# Patient Record
Sex: FEMALE | Race: WHITE | NOT HISPANIC OR LATINO | URBAN - METROPOLITAN AREA
[De-identification: names, ages, dates, MRNs, and addresses within clinical notes are randomized per-mention and may not be internally consistent; named-entity substitution may affect disease eponyms.]

---

## 2019-09-29 ENCOUNTER — INPATIENT HOSPITAL (OUTPATIENT)
Dept: URBAN - METROPOLITAN AREA HOSPITAL 107 | Facility: HOSPITAL | Age: 59
End: 2019-09-29
Payer: COMMERCIAL

## 2019-09-29 DIAGNOSIS — I63.9 CEREBRAL INFARCTION, UNSPECIFIED: ICD-10-CM

## 2019-09-29 DIAGNOSIS — K62.5 HEMORRHAGE OF ANUS AND RECTUM: ICD-10-CM

## 2019-09-29 DIAGNOSIS — D64.89 OTHER SPECIFIED ANEMIAS: ICD-10-CM

## 2019-09-29 PROCEDURE — 99223 1ST HOSP IP/OBS HIGH 75: CPT | Performed by: INTERNAL MEDICINE

## 2019-09-30 ENCOUNTER — INPATIENT HOSPITAL (OUTPATIENT)
Dept: URBAN - METROPOLITAN AREA HOSPITAL 107 | Facility: HOSPITAL | Age: 59
End: 2019-09-30
Payer: COMMERCIAL

## 2019-09-30 DIAGNOSIS — B18.2 CHRONIC VIRAL HEPATITIS C: ICD-10-CM

## 2019-09-30 DIAGNOSIS — K62.5 HEMORRHAGE OF ANUS AND RECTUM: ICD-10-CM

## 2019-09-30 DIAGNOSIS — D64.9 ANEMIA, UNSPECIFIED: ICD-10-CM

## 2019-09-30 PROCEDURE — 99232 SBSQ HOSP IP/OBS MODERATE 35: CPT | Performed by: PHYSICIAN ASSISTANT

## 2019-10-02 ENCOUNTER — INPATIENT HOSPITAL (OUTPATIENT)
Dept: URBAN - METROPOLITAN AREA HOSPITAL 107 | Facility: HOSPITAL | Age: 59
End: 2019-10-02
Payer: COMMERCIAL

## 2019-10-02 DIAGNOSIS — B18.2 CHRONIC VIRAL HEPATITIS C: ICD-10-CM

## 2019-10-02 DIAGNOSIS — D64.9 ANEMIA, UNSPECIFIED: ICD-10-CM

## 2019-10-02 DIAGNOSIS — K62.5 HEMORRHAGE OF ANUS AND RECTUM: ICD-10-CM

## 2019-10-02 DIAGNOSIS — I63.9 CEREBRAL INFARCTION, UNSPECIFIED: ICD-10-CM

## 2019-10-02 DIAGNOSIS — K50.90 CROHN'S DISEASE, UNSPECIFIED, WITHOUT COMPLICATIONS: ICD-10-CM

## 2019-10-02 PROCEDURE — 99232 SBSQ HOSP IP/OBS MODERATE 35: CPT | Performed by: PHYSICIAN ASSISTANT

## 2019-10-03 PROCEDURE — 99231 SBSQ HOSP IP/OBS SF/LOW 25: CPT | Performed by: PHYSICIAN ASSISTANT

## 2020-02-08 ENCOUNTER — APPOINTMENT (OUTPATIENT)
Dept: GENERAL RADIOLOGY | Facility: HOSPITAL | Age: 60
End: 2020-02-08

## 2020-02-08 ENCOUNTER — APPOINTMENT (OUTPATIENT)
Dept: CT IMAGING | Facility: HOSPITAL | Age: 60
End: 2020-02-08

## 2020-02-08 ENCOUNTER — HOSPITAL ENCOUNTER (INPATIENT)
Facility: HOSPITAL | Age: 60
LOS: 6 days | Discharge: HOME | End: 2020-02-14
Attending: EMERGENCY MEDICINE | Admitting: INTERNAL MEDICINE

## 2020-02-08 DIAGNOSIS — F10.229 ACUTE ALCOHOLIC INTOXICATION IN ALCOHOLISM WITH COMPLICATION (HCC): ICD-10-CM
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
## 2020-02-08 DIAGNOSIS — R19.5 HEME POSITIVE STOOL: ICD-10-CM

## 2020-02-08 DIAGNOSIS — D50.9 IRON DEFICIENCY ANEMIA, UNSPECIFIED IRON DEFICIENCY ANEMIA TYPE: ICD-10-CM

## 2020-02-08 DIAGNOSIS — S42.401A ELBOW FRACTURE, RIGHT, CLOSED, INITIAL ENCOUNTER: ICD-10-CM

## 2020-02-08 DIAGNOSIS — G81.94 LEFT HEMIPARESIS (HCC): ICD-10-CM

## 2020-02-08 LAB
ALBUMIN SERPL-MCNC: 4.5 G/DL (ref 3.5–5.2)
ALBUMIN/GLOB SERPL: 1.5 G/DL
ALP SERPL-CCNC: 97 U/L (ref 39–117)
ALT SERPL W P-5'-P-CCNC: 10 U/L (ref 1–33)
AMPHET+METHAMPHET UR QL: NEGATIVE
ANION GAP SERPL CALCULATED.3IONS-SCNC: 15.9 MMOL/L (ref 5–15)
APAP SERPL-MCNC: <5 MCG/ML (ref 10–30)
AST SERPL-CCNC: 23 U/L (ref 1–32)
BARBITURATES UR QL SCN: NEGATIVE
BASOPHILS # BLD AUTO: 0.12 10*3/MM3 (ref 0–0.2)
BASOPHILS NFR BLD AUTO: 1.7 % (ref 0–1.5)
BENZODIAZ UR QL SCN: NEGATIVE
BILIRUB SERPL-MCNC: 0.2 MG/DL (ref 0.2–1.2)
BILIRUB UR QL STRIP: NEGATIVE
BUN BLD-MCNC: 12 MG/DL (ref 8–23)
BUN/CREAT SERPL: 10.6 (ref 7–25)
CALCIUM SPEC-SCNC: 8.7 MG/DL (ref 8.6–10.5)
CANNABINOIDS SERPL QL: NEGATIVE
CHLORIDE SERPL-SCNC: 105 MMOL/L (ref 98–107)
CK SERPL-CCNC: 238 U/L (ref 20–180)
CLARITY UR: CLEAR
CO2 SERPL-SCNC: 20.1 MMOL/L (ref 22–29)
COCAINE UR QL: NEGATIVE
COLOR UR: YELLOW
CREAT BLD-MCNC: 1.13 MG/DL (ref 0.57–1)
DEPRECATED RDW RBC AUTO: 53.1 FL (ref 37–54)
EOSINOPHIL # BLD AUTO: 0.15 10*3/MM3 (ref 0–0.4)
EOSINOPHIL NFR BLD AUTO: 2.1 % (ref 0.3–6.2)
ERYTHROCYTE [DISTWIDTH] IN BLOOD BY AUTOMATED COUNT: 15.3 % (ref 12.3–15.4)
ETHANOL BLD-MCNC: 414 MG/DL (ref 0–10)
ETHANOL UR QL: 0.41 %
GFR SERPL CREATININE-BSD FRML MDRD: 49 ML/MIN/1.73
GLOBULIN UR ELPH-MCNC: 3.1 GM/DL
GLUCOSE BLD-MCNC: 109 MG/DL (ref 65–99)
GLUCOSE BLDC GLUCOMTR-MCNC: 111 MG/DL (ref 70–130)
GLUCOSE UR STRIP-MCNC: NEGATIVE MG/DL
HCT VFR BLD AUTO: 28.5 % (ref 34–46.6)
HGB BLD-MCNC: 9.2 G/DL (ref 12–15.9)
HGB UR QL STRIP.AUTO: NEGATIVE
IMM GRANULOCYTES # BLD AUTO: 0.02 10*3/MM3 (ref 0–0.05)
IMM GRANULOCYTES NFR BLD AUTO: 0.3 % (ref 0–0.5)
INR PPP: 1.23 (ref 0.9–1.1)
KETONES UR QL STRIP: NEGATIVE
LEUKOCYTE ESTERASE UR QL STRIP.AUTO: NEGATIVE
LYMPHOCYTES # BLD AUTO: 2.16 10*3/MM3 (ref 0.7–3.1)
LYMPHOCYTES NFR BLD AUTO: 30.7 % (ref 19.6–45.3)
MAGNESIUM SERPL-MCNC: 2.7 MG/DL (ref 1.6–2.4)
MCH RBC QN AUTO: 31.3 PG (ref 26.6–33)
MCHC RBC AUTO-ENTMCNC: 32.3 G/DL (ref 31.5–35.7)
MCV RBC AUTO: 96.9 FL (ref 79–97)
METHADONE UR QL SCN: NEGATIVE
MONOCYTES # BLD AUTO: 0.52 10*3/MM3 (ref 0.1–0.9)
MONOCYTES NFR BLD AUTO: 7.4 % (ref 5–12)
NEUTROPHILS # BLD AUTO: 4.06 10*3/MM3 (ref 1.7–7)
NEUTROPHILS NFR BLD AUTO: 57.8 % (ref 42.7–76)
NITRITE UR QL STRIP: NEGATIVE
NRBC BLD AUTO-RTO: 0 /100 WBC (ref 0–0.2)
OPIATES UR QL: NEGATIVE
OXYCODONE UR QL SCN: NEGATIVE
PH UR STRIP.AUTO: 5.5 [PH] (ref 5–8)
PLATELET # BLD AUTO: 351 10*3/MM3 (ref 140–450)
PMV BLD AUTO: 8.5 FL (ref 6–12)
POTASSIUM BLD-SCNC: 4 MMOL/L (ref 3.5–5.2)
PROT SERPL-MCNC: 7.6 G/DL (ref 6–8.5)
PROT UR QL STRIP: NEGATIVE
PROTHROMBIN TIME: 15.2 SECONDS (ref 11.7–14.2)
RBC # BLD AUTO: 2.94 10*6/MM3 (ref 3.77–5.28)
SALICYLATES SERPL-MCNC: 0.3 MG/DL
SODIUM BLD-SCNC: 141 MMOL/L (ref 136–145)
SP GR UR STRIP: 1.01 (ref 1–1.03)
TROPONIN T SERPL-MCNC: <0.01 NG/ML (ref 0–0.03)
UROBILINOGEN UR QL STRIP: NORMAL
WBC NRBC COR # BLD: 7.03 10*3/MM3 (ref 3.4–10.8)

## 2020-02-08 PROCEDURE — 73200 CT UPPER EXTREMITY W/O DYE: CPT

## 2020-02-08 PROCEDURE — 84484 ASSAY OF TROPONIN QUANT: CPT | Performed by: EMERGENCY MEDICINE

## 2020-02-08 PROCEDURE — G0378 HOSPITAL OBSERVATION PER HR: HCPCS

## 2020-02-08 PROCEDURE — 73070 X-RAY EXAM OF ELBOW: CPT

## 2020-02-08 PROCEDURE — 99285 EMERGENCY DEPT VISIT HI MDM: CPT

## 2020-02-08 PROCEDURE — 81003 URINALYSIS AUTO W/O SCOPE: CPT | Performed by: EMERGENCY MEDICINE

## 2020-02-08 PROCEDURE — 80307 DRUG TEST PRSMV CHEM ANLYZR: CPT | Performed by: EMERGENCY MEDICINE

## 2020-02-08 PROCEDURE — 70450 CT HEAD/BRAIN W/O DYE: CPT

## 2020-02-08 PROCEDURE — 83735 ASSAY OF MAGNESIUM: CPT | Performed by: EMERGENCY MEDICINE

## 2020-02-08 PROCEDURE — 82550 ASSAY OF CK (CPK): CPT | Performed by: EMERGENCY MEDICINE

## 2020-02-08 PROCEDURE — 85025 COMPLETE CBC W/AUTO DIFF WBC: CPT | Performed by: EMERGENCY MEDICINE

## 2020-02-08 PROCEDURE — 82962 GLUCOSE BLOOD TEST: CPT

## 2020-02-08 PROCEDURE — 85610 PROTHROMBIN TIME: CPT | Performed by: EMERGENCY MEDICINE

## 2020-02-08 PROCEDURE — 25010000002 THIAMINE PER 100 MG: Performed by: EMERGENCY MEDICINE

## 2020-02-08 PROCEDURE — 25010000002 MAGNESIUM SULFATE PER 500 MG OF MAGNESIUM: Performed by: EMERGENCY MEDICINE

## 2020-02-08 PROCEDURE — 93005 ELECTROCARDIOGRAM TRACING: CPT | Performed by: EMERGENCY MEDICINE

## 2020-02-08 PROCEDURE — 80053 COMPREHEN METABOLIC PANEL: CPT | Performed by: EMERGENCY MEDICINE

## 2020-02-08 PROCEDURE — 93010 ELECTROCARDIOGRAM REPORT: CPT | Performed by: INTERNAL MEDICINE

## 2020-02-08 RX ORDER — ATORVASTATIN CALCIUM 40 MG/1
40 TABLET, FILM COATED ORAL NIGHTLY
COMMUNITY

## 2020-02-08 RX ORDER — FAMOTIDINE 20 MG/1
20 TABLET, FILM COATED ORAL
Status: DISCONTINUED | OUTPATIENT
Start: 2020-02-09 | End: 2020-02-13

## 2020-02-08 RX ORDER — SODIUM CHLORIDE 9 MG/ML
100 INJECTION, SOLUTION INTRAVENOUS CONTINUOUS
Status: DISCONTINUED | OUTPATIENT
Start: 2020-02-08 | End: 2020-02-11

## 2020-02-08 RX ORDER — NITROGLYCERIN 0.4 MG/1
0.4 TABLET SUBLINGUAL
Status: DISCONTINUED | OUTPATIENT
Start: 2020-02-08 | End: 2020-02-14 | Stop reason: HOSPADM

## 2020-02-08 RX ORDER — CETIRIZINE HYDROCHLORIDE 5 MG/1
5 TABLET ORAL DAILY
COMMUNITY

## 2020-02-08 RX ORDER — DULOXETIN HYDROCHLORIDE 60 MG/1
60 CAPSULE, DELAYED RELEASE ORAL DAILY
COMMUNITY

## 2020-02-08 RX ORDER — ONDANSETRON 2 MG/ML
4 INJECTION INTRAMUSCULAR; INTRAVENOUS EVERY 6 HOURS PRN
Status: DISCONTINUED | OUTPATIENT
Start: 2020-02-08 | End: 2020-02-14 | Stop reason: HOSPADM

## 2020-02-08 RX ORDER — UREA 10 %
1 LOTION (ML) TOPICAL NIGHTLY
COMMUNITY

## 2020-02-08 RX ORDER — ATORVASTATIN CALCIUM 20 MG/1
40 TABLET, FILM COATED ORAL NIGHTLY
Status: DISCONTINUED | OUTPATIENT
Start: 2020-02-08 | End: 2020-02-14 | Stop reason: HOSPADM

## 2020-02-08 RX ORDER — NITROGLYCERIN 0.4 MG/1
0.4 TABLET SUBLINGUAL
COMMUNITY

## 2020-02-08 RX ORDER — AMLODIPINE BESYLATE 10 MG/1
10 TABLET ORAL DAILY
COMMUNITY

## 2020-02-08 RX ORDER — ACETAMINOPHEN 325 MG/1
650 TABLET ORAL EVERY 6 HOURS PRN
COMMUNITY

## 2020-02-08 RX ORDER — LORAZEPAM 2 MG/ML
2 INJECTION INTRAMUSCULAR
Status: DISCONTINUED | OUTPATIENT
Start: 2020-02-08 | End: 2020-02-14 | Stop reason: HOSPADM

## 2020-02-08 RX ORDER — GUAIFENESIN AND DEXTROMETHORPHAN HYDROBROMIDE 100; 10 MG/5ML; MG/5ML
10 SOLUTION ORAL 4 TIMES DAILY PRN
COMMUNITY

## 2020-02-08 RX ORDER — THIAMINE MONONITRATE (VIT B1) 100 MG
100 TABLET ORAL DAILY
COMMUNITY

## 2020-02-08 RX ORDER — LORAZEPAM 2 MG/ML
1 INJECTION INTRAMUSCULAR
Status: DISCONTINUED | OUTPATIENT
Start: 2020-02-08 | End: 2020-02-14 | Stop reason: HOSPADM

## 2020-02-08 RX ORDER — AMLODIPINE BESYLATE 10 MG/1
10 TABLET ORAL DAILY
Status: DISCONTINUED | OUTPATIENT
Start: 2020-02-09 | End: 2020-02-14 | Stop reason: HOSPADM

## 2020-02-08 RX ORDER — ONDANSETRON 4 MG/1
4 TABLET, FILM COATED ORAL EVERY 6 HOURS PRN
Status: DISCONTINUED | OUTPATIENT
Start: 2020-02-08 | End: 2020-02-14 | Stop reason: HOSPADM

## 2020-02-08 RX ORDER — UREA 10 %
1 LOTION (ML) TOPICAL NIGHTLY
Status: DISCONTINUED | OUTPATIENT
Start: 2020-02-08 | End: 2020-02-12

## 2020-02-08 RX ORDER — FOLIC ACID 1 MG/1
1 TABLET ORAL DAILY
COMMUNITY

## 2020-02-08 RX ORDER — ASPIRIN 81 MG/1
81 TABLET ORAL DAILY
Status: DISCONTINUED | OUTPATIENT
Start: 2020-02-09 | End: 2020-02-14 | Stop reason: HOSPADM

## 2020-02-08 RX ORDER — FERROUS SULFATE 325(65) MG
325 TABLET ORAL
COMMUNITY

## 2020-02-08 RX ORDER — LORAZEPAM 1 MG/1
1 TABLET ORAL
Status: DISCONTINUED | OUTPATIENT
Start: 2020-02-08 | End: 2020-02-14 | Stop reason: HOSPADM

## 2020-02-08 RX ORDER — POLYETHYLENE GLYCOL 3350 17 G/17G
17 POWDER, FOR SOLUTION ORAL DAILY PRN
Status: DISCONTINUED | OUTPATIENT
Start: 2020-02-08 | End: 2020-02-14 | Stop reason: HOSPADM

## 2020-02-08 RX ORDER — ACETAMINOPHEN 325 MG/1
650 TABLET ORAL EVERY 6 HOURS PRN
Status: DISCONTINUED | OUTPATIENT
Start: 2020-02-08 | End: 2020-02-14 | Stop reason: HOSPADM

## 2020-02-08 RX ORDER — FERROUS SULFATE 325(65) MG
325 TABLET ORAL
Status: DISCONTINUED | OUTPATIENT
Start: 2020-02-09 | End: 2020-02-14 | Stop reason: HOSPADM

## 2020-02-08 RX ORDER — DULOXETIN HYDROCHLORIDE 60 MG/1
60 CAPSULE, DELAYED RELEASE ORAL DAILY
Status: DISCONTINUED | OUTPATIENT
Start: 2020-02-09 | End: 2020-02-14 | Stop reason: HOSPADM

## 2020-02-08 RX ORDER — FOLIC ACID 1 MG/1
1 TABLET ORAL DAILY
Status: DISCONTINUED | OUTPATIENT
Start: 2020-02-09 | End: 2020-02-14 | Stop reason: HOSPADM

## 2020-02-08 RX ORDER — ACETAMINOPHEN 500 MG
1000 TABLET ORAL ONCE
Status: COMPLETED | OUTPATIENT
Start: 2020-02-08 | End: 2020-02-08

## 2020-02-08 RX ORDER — SODIUM CHLORIDE 0.9 % (FLUSH) 0.9 %
10 SYRINGE (ML) INJECTION AS NEEDED
Status: DISCONTINUED | OUTPATIENT
Start: 2020-02-08 | End: 2020-02-14 | Stop reason: HOSPADM

## 2020-02-08 RX ORDER — ONDANSETRON 4 MG/1
4 TABLET, FILM COATED ORAL EVERY 6 HOURS PRN
COMMUNITY

## 2020-02-08 RX ORDER — CARVEDILOL 12.5 MG/1
12.5 TABLET ORAL 2 TIMES DAILY WITH MEALS
Status: DISCONTINUED | OUTPATIENT
Start: 2020-02-08 | End: 2020-02-14 | Stop reason: HOSPADM

## 2020-02-08 RX ORDER — ASPIRIN 81 MG/1
81 TABLET ORAL DAILY
COMMUNITY

## 2020-02-08 RX ORDER — ASCORBIC ACID 500 MG
500 TABLET ORAL DAILY
COMMUNITY

## 2020-02-08 RX ORDER — THIAMINE MONONITRATE (VIT B1) 100 MG
100 TABLET ORAL DAILY
Status: DISCONTINUED | OUTPATIENT
Start: 2020-02-09 | End: 2020-02-14 | Stop reason: HOSPADM

## 2020-02-08 RX ORDER — CARVEDILOL 12.5 MG/1
12.5 TABLET ORAL 2 TIMES DAILY WITH MEALS
COMMUNITY

## 2020-02-08 RX ORDER — FAMOTIDINE 40 MG/1
20 TABLET, FILM COATED ORAL 2 TIMES DAILY
COMMUNITY
End: 2020-02-14 | Stop reason: HOSPADM

## 2020-02-08 RX ORDER — LORAZEPAM 1 MG/1
2 TABLET ORAL
Status: DISCONTINUED | OUTPATIENT
Start: 2020-02-08 | End: 2020-02-14 | Stop reason: HOSPADM

## 2020-02-08 RX ADMIN — CARVEDILOL 12.5 MG: 12.5 TABLET, FILM COATED ORAL at 22:48

## 2020-02-08 RX ADMIN — ATORVASTATIN CALCIUM 40 MG: 20 TABLET, FILM COATED ORAL at 22:49

## 2020-02-08 RX ADMIN — Medication 1 MG: at 22:50

## 2020-02-08 RX ADMIN — ACETAMINOPHEN 1000 MG: 500 TABLET, FILM COATED ORAL at 09:42

## 2020-02-08 RX ADMIN — THIAMINE HYDROCHLORIDE 1000 ML/HR: 100 INJECTION, SOLUTION INTRAMUSCULAR; INTRAVENOUS at 08:39

## 2020-02-08 RX ADMIN — LORAZEPAM 2 MG: 1 TABLET ORAL at 22:49

## 2020-02-08 NOTE — ED PROVIDER NOTES
" EMERGENCY DEPARTMENT ENCOUNTER    Room Number:  33/33  Date of encounter:  2/8/2020  PCP: Provider, No Known  Historian: Patient and EMS      HPI:  Chief Complaint: Confusion  A complete HPI/ROS/PMH/PSH/SH/FH are unobtainable due to: Mental status change  Context: Marylin iWlcox is a 59 y.o. female who presents to the ED from [facility] after pt was found down on the floor this AM, confused and drowsy, with a bottle of vodka, per EMS. She is unsure why she was brought here by EMS and states she \"feels fine\". Hx of two strokes with residual L sided deficits. Her last stroke was in 2019. She states she drinks approx. 2 drinks per day.    MEDICAL HISTORY REVIEW  Pt was admitted to [facility] on 9/28/2019 for cerebral brain hemorrhage and GI hemorrhage.    PAST MEDICAL HISTORY  Active Ambulatory Problems     Diagnosis Date Noted   • No Active Ambulatory Problems     Resolved Ambulatory Problems     Diagnosis Date Noted   • No Resolved Ambulatory Problems     No Additional Past Medical History         PAST SURGICAL HISTORY  No past surgical history on file.      FAMILY HISTORY  No family history on file.      SOCIAL HISTORY  Social History     Socioeconomic History   • Marital status: Single     Spouse name: Not on file   • Number of children: Not on file   • Years of education: Not on file   • Highest education level: Not on file         ALLERGIES  Valsartan        REVIEW OF SYSTEMS  Review of Systems   Unable to perform ROS: Mental status change   Psychiatric/Behavioral: Positive for confusion (per EMS).        All systems reviewed and negative except for those discussed in HPI.       PHYSICAL EXAM    I have reviewed the triage vital signs and nursing notes.    ED Triage Vitals [02/08/20 0756]   Temp Heart Rate Resp BP SpO2   98 °F (36.7 °C) 60 18 124/92 95 %      Temp src Heart Rate Source Patient Position BP Location FiO2 (%)   Tympanic Monitor -- -- --       Physical Exam    Physical Exam "   Constitutional: No distress.   HENT:  Head: Normocephalic and atraumatic. Atraumatic calvarium.  Oropharynx: Mucous membranes are moist.   Eyes: No scleral icterus. Conjunctival pallor.  Neck: Painless range of motion noted. Neck supple.   Cardiovascular: Normal rate, regular rhythm and intact distal pulses.  Pulmonary/Chest: No respiratory distress. There are no wheezes, no rhonchi, and no rales.   Abdominal: Soft. There is no focal tenderness. There is no rebound and no guarding. She is mildly distended.  Musculoskeletal: There is no pedal edema or calf tenderness. Her external back is atraumatic. There is no midline tenderness to palpation of the C or T spine. She has painless ROM of the neck.  Neurological: Alert. Her speech is slightly garbled, but intelligible. She is able to identify herself and her sisters, at bedside, but is unable to identify the date or location. Baseline sensation noted. She has inability to look to the left. She is only able to raise her L arm off the bed against gravity. She has mild contracture of the L hand. She is able to raise both legs off the bed, but her L is weaker (due to prior stroke).  Skin: Skin is pink, warm, and dry. No pallor. There is diffuse ecchymosis to the BUE with varying age. There is a large bruise to her R elbow.  Psychiatric: Mood and affect normal.   Nursing note and vitals reviewed.    LAB RESULTS  Recent Results (from the past 24 hour(s))   POC Glucose Once    Collection Time: 02/08/20  8:17 AM   Result Value Ref Range    Glucose 111 70 - 130 mg/dL   Comprehensive Metabolic Panel    Collection Time: 02/08/20  8:40 AM   Result Value Ref Range    Glucose 109 (H) 65 - 99 mg/dL    BUN 12 8 - 23 mg/dL    Creatinine 1.13 (H) 0.57 - 1.00 mg/dL    Sodium 141 136 - 145 mmol/L    Potassium 4.0 3.5 - 5.2 mmol/L    Chloride 105 98 - 107 mmol/L    CO2 20.1 (L) 22.0 - 29.0 mmol/L    Calcium 8.7 8.6 - 10.5 mg/dL    Total Protein 7.6 6.0 - 8.5 g/dL    Albumin 4.50 3.50 -  5.20 g/dL    ALT (SGPT) 10 1 - 33 U/L    AST (SGOT) 23 1 - 32 U/L    Alkaline Phosphatase 97 39 - 117 U/L    Total Bilirubin 0.2 0.2 - 1.2 mg/dL    eGFR Non African Amer 49 (L) >60 mL/min/1.73    Globulin 3.1 gm/dL    A/G Ratio 1.5 g/dL    BUN/Creatinine Ratio 10.6 7.0 - 25.0    Anion Gap 15.9 (H) 5.0 - 15.0 mmol/L   Protime-INR    Collection Time: 02/08/20  8:40 AM   Result Value Ref Range    Protime 15.2 (H) 11.7 - 14.2 Seconds    INR 1.23 (H) 0.90 - 1.10   Troponin    Collection Time: 02/08/20  8:40 AM   Result Value Ref Range    Troponin T <0.010 0.000 - 0.030 ng/mL   Magnesium    Collection Time: 02/08/20  8:40 AM   Result Value Ref Range    Magnesium 2.7 (H) 1.6 - 2.4 mg/dL   Acetaminophen Level    Collection Time: 02/08/20  8:40 AM   Result Value Ref Range    Acetaminophen <5.0 (L) 10.0 - 30.0 mcg/mL   Ethanol    Collection Time: 02/08/20  8:40 AM   Result Value Ref Range    Ethanol 414 (H) 0 - 10 mg/dL    Ethanol % 0.414 %   Salicylate Level    Collection Time: 02/08/20  8:40 AM   Result Value Ref Range    Salicylate 0.3 <=30.0 mg/dL   CBC Auto Differential    Collection Time: 02/08/20  8:40 AM   Result Value Ref Range    WBC 7.03 3.40 - 10.80 10*3/mm3    RBC 2.94 (L) 3.77 - 5.28 10*6/mm3    Hemoglobin 9.2 (L) 12.0 - 15.9 g/dL    Hematocrit 28.5 (L) 34.0 - 46.6 %    MCV 96.9 79.0 - 97.0 fL    MCH 31.3 26.6 - 33.0 pg    MCHC 32.3 31.5 - 35.7 g/dL    RDW 15.3 12.3 - 15.4 %    RDW-SD 53.1 37.0 - 54.0 fl    MPV 8.5 6.0 - 12.0 fL    Platelets 351 140 - 450 10*3/mm3    Neutrophil % 57.8 42.7 - 76.0 %    Lymphocyte % 30.7 19.6 - 45.3 %    Monocyte % 7.4 5.0 - 12.0 %    Eosinophil % 2.1 0.3 - 6.2 %    Basophil % 1.7 (H) 0.0 - 1.5 %    Immature Grans % 0.3 0.0 - 0.5 %    Neutrophils, Absolute 4.06 1.70 - 7.00 10*3/mm3    Lymphocytes, Absolute 2.16 0.70 - 3.10 10*3/mm3    Monocytes, Absolute 0.52 0.10 - 0.90 10*3/mm3    Eosinophils, Absolute 0.15 0.00 - 0.40 10*3/mm3    Basophils, Absolute 0.12 0.00 - 0.20 10*3/mm3     Immature Grans, Absolute 0.02 0.00 - 0.05 10*3/mm3    nRBC 0.0 0.0 - 0.2 /100 WBC   CK    Collection Time: 02/08/20  8:40 AM   Result Value Ref Range    Creatine Kinase 238 (H) 20 - 180 U/L       Ordered the above labs and independently reviewed the results.        RADIOLOGY  Xr Elbow 2 View Right    Result Date: 2/8/2020  THREE-VIEW RIGHT ELBOW  HISTORY: Fell. Elbow pain.  FINDINGS: There are displaced fracture fragments involving the medial epicondyle with superior displacement of the fracture fragments measuring up to 4.5 cm. These fracture fragments are adjacent to the posterior margin of the distal humerus and some of the fracture margins appear fairly smooth and potentially related to long-standing injury and clinical correlation is therefore required. I suspect there is also an acute component with associated large joint effusion as well as some soft tissue swelling in this region.  This report was finalized on 2/8/2020 9:20 AM by Dr. Joe Kwan M.D.      Ct Head Without Contrast    Result Date: 2/8/2020  CT BRAIN WITHOUT CONTRAST  HISTORY: Fell. Confusion.  FINDINGS: The CT scan was performed as an emergency procedure through the brain without contrast. There is mild diffuse atrophy and chronic small vessel ischemic change. There is a localized area of encephalomalacia in the right parietal region most consistent with old infarct. There is no evidence of acute hemorrhage or mass effect and the visualized sinuses are clear.      Radiation dose reduction techniques were utilized, including automated exposure control and exposure modulation based on body size.  This report was finalized on 2/8/2020 9:20 AM by Dr. Joe Kwan M.D.        I ordered the above noted radiological studies. Reviewed by me and discussed with radiologist.  See dictation for official radiology interpretation.      PROCEDURES    Splint - Cast - Strapping  Date/Time: 2/8/2020 11:09 AM  Performed by: Gustavo Alvarez MD  Authorized by:  Gustavo Alvarez MD     Consent:     Consent obtained:  Verbal    Consent given by:  Patient    Risks discussed:  Numbness and pain  Pre-procedure details:     Sensation:  Normal  Procedure details:     Laterality:  Right    Location:  Elbow    Elbow:  R elbow    Strapping: no      Splint type:  Long arm    Supplies:  Elastic bandage, Ortho-Glass, cotton padding and sling  Post-procedure details:     Pain:  Unchanged    Sensation:  Normal    Patient tolerance of procedure:  Tolerated well, no immediate complications        EKG           EKG time: 0826  Rhythm/Rate: NSR rate 70  P waves and MI: Prolonged MI segment  QRS, axis: Narrow QRS complex  ST and T waves:   No STEMI  QTC is within normal limits    Interpreted Contemporaneously by me, independently viewed  Comparison: No old for comparison.      MEDICATIONS GIVEN IN ER    Medications   sodium chloride 0.9 % flush 10 mL (has no administration in time range)   multiple vitamin (M.V.I. Adult) 10 mL, thiamine (B-1) 100 mg, folic acid 1 mg, magnesium sulfate 2 g in sodium chloride 0.9 % 1,000 mL infusion (1,000 mL/hr Intravenous New Bag 2/8/20 0839)   acetaminophen (TYLENOL) tablet 1,000 mg (1,000 mg Oral Given 2/8/20 0942)         PROGRESS, DATA ANALYSIS, CONSULTS, AND MEDICAL DECISION MAKING    0801 Ordered labs, EKG, UDS, ethanol level, acetaminophen level, POC glucose, and CT head for further evaluation.    0813 Ordered XR R elbow for further evaluation.    0924 Per pt family, pt is c/o HA. Ordered tylenol. Placed call to [facility] for admission.    0925 Discussed results of pt CT and XR with radiologist, Dr Kwan.    0940 Discussed pt with Dr. Cao, on call for Dr. Cornejo, [facility], who agrees to admit. He requests ortho consult and admission for observation to telemetry.    1004 Rechecked with pt, who is resting comfortably, with two sisters at bedside. Pt is now more coherent at this time. She states that her R elbow has been bruised and tender for approx. 3  months. I have informed her and her family of the results of her work up and that she will need to be admitted for her fractured R elbow and ortho consult. She requires admission as she does not have much functionality of her L side to compensate with the R elbow fracture. Plan to admit after she is splinted. Pt and pt family understand and agree with the plan, all questions answered.    1108 Discussed pt with Dr. Beal ortho, who will consult and requests a CT be obtained of the R elbow.    1110 Rechecked with pt and applied splint. Plan to admit as previously discussed.    All labs have been independently reviewed by me.  All radiology studies have been reviewed by me and discussed with radiologist dictating the report.   EKG's independently viewed and interpreted by me.  Discussion below represents my analysis of pertinent findings related to patient's condition, differential diagnosis, treatment plan and final disposition.       AS OF 9:53 AM VITALS:    BP - 130/89  HR - 75  TEMP - 98 °F (36.7 °C) (Tympanic)  O2 SATS - 97%        DIAGNOSIS  Final diagnoses:   Fall, initial encounter   Acute alcoholic intoxication in alcoholism with complication (CMS/HCC)   Elbow fracture, right, closed, initial encounter   Left hemiparesis (CMS/HCC)         DISPOSITION  ADMISSION TO TELEMETRY    Discussed treatment plan and reason for admission with pt/family and admitting physician.  Pt/family voiced understanding of the plan for admission for further testing/treatment as needed.     Documentation assistance provided by giovanny Villatoro for Gustavo Alvarez MD.  Information recorded by the giovanny was done at my direction and has been verified and validated by me.     Maine Villatoro  02/08/20 1110       Gustavo Alvarez MD  02/08/20 7405

## 2020-02-08 NOTE — H&P
HISTORY AND PHYSICAL   [facility]        Patient Identification:  Name: Marylin Wilcox  Age: 59 y.o.  Sex: female  :  1960  MRN: [id number]                     Primary Care Physician: Provider, No Known    Chief Complaint:  59 year old female was brought in after a fall at home; she was found on the floor; she was confused and had a bottle of vodka; she is unsure of what happened and is still mildly confused; she has a history of prior hemorrhagic stroke and gi bleeding; she also has a history of alcohol dependence; two sisters are at the bedside; the patient lives in an independent living facility and has had four falls within the last week per family; the patient does not remember this;     History of Present Illness:   As above    Past Medical History:  Past Medical History:   Diagnosis Date   • Alcohol abuse    • Allergic rhinitis    • Anemia    • Crohn's colitis (CMS/HCC)    • Depression    • GERD (gastroesophageal reflux disease)    • GI bleed    • Hepatitis C    • Hernia, diaphragmatic    • Hypertension    • IBS (irritable bowel syndrome)    • Iron deficiency    • Stroke (CMS/HCC)    • Syncope    • Syncope      Past Surgical History:  History reviewed. No pertinent surgical history.   Home Meds:  Medications Prior to Admission   Medication Sig Dispense Refill Last Dose   • acetaminophen (TYLENOL) 325 MG tablet Take 650 mg by mouth Every 6 (Six) Hours As Needed for Mild Pain .      • amLODIPine (NORVASC) 10 MG tablet Take 10 mg by mouth Daily.      • aspirin 81 MG EC tablet Take 81 mg by mouth Daily.      • atorvastatin (LIPITOR) 40 MG tablet Take 40 mg by mouth Every Night.      • carvedilol (COREG) 12.5 MG tablet Take 12.5 mg by mouth 2 (Two) Times a Day With Meals.      • cetirizine (zyrTEC) 5 MG tablet Take 5 mg by mouth Daily.      • DULoxetine (CYMBALTA) 60 MG capsule Take 60 mg by mouth Daily.      • famotidine (PEPCID) 40 MG tablet Take 20 mg by mouth 2 (Two) Times a Day.      •  ferrous sulfate 325 (65 FE) MG tablet Take 325 mg by mouth Daily With Breakfast.      • folic acid (FOLVITE) 1 MG tablet Take 1 mg by mouth Daily.      • magnesium oxide (MAG-OX) 400 tablet tablet Take 400 mg by mouth Daily.      • melatonin 1 MG tablet Take 1 mg by mouth Every Night.      • Multiple Vitamins-Minerals (MULTIVITAMIN ADULT PO) Take  by mouth Daily.      • nitroglycerin (NITROSTAT) 0.4 MG SL tablet Place 0.4 mg under the tongue Every 5 (Five) Minutes As Needed for Chest Pain. Take no more than 3 doses in 15 minutes.      • ondansetron (ZOFRAN) 4 MG tablet Take 4 mg by mouth Every 6 (Six) Hours As Needed for Nausea or Vomiting.      • Polyethylene Glycol 3350 (MIRALAX PO) Take 17 g by mouth Daily As Needed.      • Pseudoephedrine-DM-GG (ROBAFEN CF PO) Take 10 mL by mouth 4 (Four) Times a Day As Needed.      • thiamine (VITAMIN B-1) 100 MG tablet Take 100 mg by mouth Daily.      • vitamin C (ASCORBIC ACID) 500 MG tablet Take 500 mg by mouth Daily.          Allergies:  Allergies   Allergen Reactions   • Valsartan Nausea And Vomiting     Immunizations:    There is no immunization history on file for this patient.  Social History:   Social History     Social History Narrative   • Not on file     Social History     Socioeconomic History   • Marital status: Single     Spouse name: Not on file   • Number of children: Not on file   • Years of education: Not on file   • Highest education level: Not on file   Tobacco Use   • Smoking status: Former Smoker     Last attempt to quit: 2019     Years since quittin.3   • Smokeless tobacco: Never Used   Substance and Sexual Activity   • Alcohol use: Yes     Alcohol/week: 14.0 standard drinks     Types: 14 Shots of liquor per week     Comment: 2 DRINKS/DAY SINCE NOV. GRADUALLY INCREASING   • Drug use: Not Currently   • Sexual activity: Defer       Family History:  History reviewed. No pertinent family history.     Review of Systems  Patient is confused and  "forgetful so cannot give an accurate ros.    Objective:  tMax 24 hrs: Temp (24hrs), Av.2 °F (36.8 °C), Min:97.7 °F (36.5 °C), Max:98.8 °F (37.1 °C)    Vitals Ranges:   Temp:  [97.7 °F (36.5 °C)-98.8 °F (37.1 °C)] 97.7 °F (36.5 °C)  Heart Rate:  [60-94] 94  Resp:  [18-20] 20  BP: (114-140)/(73-94) 140/94      Exam:  /94 (BP Location: Left arm, Patient Position: Lying)   Pulse 94   Temp 97.7 °F (36.5 °C) (Oral)   Resp 20   Ht 170.2 cm (67\")   Wt 72.6 kg (160 lb)   SpO2 97%   BMI 25.06 kg/m²     General Appearance:     Alert, cooperative, no distress, appears older than stated age and anxious   Head:    Normocephalic, without obvious abnormality, atraumatic   Eyes:    PERRL, conjunctiva/corneas clear, EOM's intact, both eyes   Ears:    Normal external ear canals, both ears   Nose:   Nares normal, septum midline, mucosa normal, no drainage    or sinus tenderness   Throat:   Lips, mucosa, and tongue normal   Neck:   Supple, symmetrical, trachea midline, no adenopathy;     thyroid:  no enlargement/tenderness/nodules; no carotid    bruit or JVD   Back:     Symmetric, no curvature, ROM normal, no CVA tenderness   Lungs:     Clear to auscultation bilaterally, respirations unlabored   Chest Wall:    No tenderness or deformity    Heart:    Regular rate and rhythm, S1 and S2 normal, no murmur, rub   or gallop   Abdomen:     Soft, non-tender, bowel sounds active all four quadrants,     no masses, no hepatomegaly, no splenomegaly   Extremities:   Extremities normal, atraumatic, no cyanosis or edema; right arm with splint and ace wrap   Pulses:   2+ and symmetric all extremities   Skin:   Skin color, texture, turgor normal, no rashes or lesions   Lymph nodes:   Cervical, supraclavicular, and axillary nodes normal   Neurologic:   CNII-XII intact, normal strength, sensation intact throughout      .    Data Review:  Labs in chart were reviewed.  WBC   Date Value Ref Range Status   2020 7.03 3.40 - 10.80 " 10*3/mm3 Final     Hemoglobin   Date Value Ref Range Status   2020 9.2 (L) 12.0 - 15.9 g/dL Final     Hematocrit   Date Value Ref Range Status   2020 28.5 (L) 34.0 - 46.6 % Final     Platelets   Date Value Ref Range Status   2020 351 140 - 450 10*3/mm3 Final     Sodium   Date Value Ref Range Status   2020 141 136 - 145 mmol/L Final     Potassium   Date Value Ref Range Status   2020 4.0 3.5 - 5.2 mmol/L Final     Chloride   Date Value Ref Range Status   2020 105 98 - 107 mmol/L Final     CO2   Date Value Ref Range Status   2020 20.1 (L) 22.0 - 29.0 mmol/L Final     BUN   Date Value Ref Range Status   2020 12 8 - 23 mg/dL Final     Creatinine   Date Value Ref Range Status   2020 1.13 (H) 0.57 - 1.00 mg/dL Final     Glucose   Date Value Ref Range Status   2020 109 (H) 65 - 99 mg/dL Final     Calcium   Date Value Ref Range Status   2020 8.7 8.6 - 10.5 mg/dL Final     Magnesium   Date Value Ref Range Status   2020 2.7 (H) 1.6 - 2.4 mg/dL Final     AST (SGOT)   Date Value Ref Range Status   2020 23 1 - 32 U/L Final     ALT (SGPT)   Date Value Ref Range Status   2020 10 1 - 33 U/L Final     Alkaline Phosphatase   Date Value Ref Range Status   2020 97 39 - 117 U/L Final                Imaging Results (All)     Procedure Component Value Units Date/Time    CT Upper Extremity Right Without Contrast [[id number]] Collected:  20 1317     Updated:  20 1321    Narrative:       CT RIGHT ELBOW WITHOUT CONTRAST     HISTORY: Recent fall. Right elbow fracture.     TECHNIQUE/FINDINGS: The CT scan was performed with thin axial images  followed by coronal and sagittal reconstructions and demonstrates the  followin. There is a fracture of the posterior half of the olecranon with  several fracture fragments extending posteriorly and superiorly with the  largest fracture fragment measuring up to 2.1 x 2.7 cm.  2. There may also be a  minimal fracture involving the medial margin of  the medial humeral epicondyle. There is some associated joint effusion  or hemarthrosis present.                 Radiation dose reduction techniques were utilized, including automated  exposure control and exposure modulation based on body size.     This report was finalized on 2/8/2020 1:18 PM by Dr. Joe Kwan M.D.       XR Elbow 2 View Right [[id number]] Collected:  02/08/20 0915     Updated:  02/08/20 0923    Narrative:       THREE-VIEW RIGHT ELBOW     HISTORY: Fell. Elbow pain.     FINDINGS: There are displaced fracture fragments involving the medial  epicondyle with superior displacement of the fracture fragments  measuring up to 4.5 cm. These fracture fragments are adjacent to the  posterior margin of the distal humerus and some of the fracture margins  appear fairly smooth and potentially related to long-standing injury and  clinical correlation is therefore required. I suspect there is also an  acute component with associated large joint effusion as well as some  soft tissue swelling in this region.     This report was finalized on 2/8/2020 9:20 AM by Dr. Joe Kwan M.D.       CT Head Without Contrast [[id number]] Collected:  02/08/20 0919     Updated:  02/08/20 0923    Narrative:       CT BRAIN WITHOUT CONTRAST     HISTORY: Fell. Confusion.     FINDINGS: The CT scan was performed as an emergency procedure through  the brain without contrast. There is mild diffuse atrophy and chronic  small vessel ischemic change. There is a localized area of  encephalomalacia in the right parietal region most consistent with old  infarct. There is no evidence of acute hemorrhage or mass effect and the  visualized sinuses are clear.                 Radiation dose reduction techniques were utilized, including automated  exposure control and exposure modulation based on body size.     This report was finalized on 2/8/2020 9:20 AM by Dr. Joe Kwan M.D.            Patient Active Problem List   Diagnosis Code   • Fall W19.XXXA       Assessment:    Fall  alcohol dependence  Right elbow fracture  H.o cva  anemia  Alcohol intoxication poa  Hepatitis c  Plan:  Initiate ciwa protocol  Monitor on telemetry due to likely alcohol withdrawal  Jolie patient and sisters  Awaiting input from orthopedics regarding her fractured elbow  Monitor hgb  Jolie family, nurse, patient  Will likely need a higher level of care after discharge  Access center to see as well    Reyna Cao MD  2/8/2020  4:56 PM

## 2020-02-08 NOTE — ED TRIAGE NOTES
[facility]  Patient was observed on floor leaning on bed with a vodka bottle.  Last known normal yesterday around 1900.  Patient would like assistance with alcohol addiction.

## 2020-02-09 PROCEDURE — G0378 HOSPITAL OBSERVATION PER HR: HCPCS

## 2020-02-09 PROCEDURE — 90791 PSYCH DIAGNOSTIC EVALUATION: CPT | Performed by: SOCIAL WORKER

## 2020-02-09 PROCEDURE — 99222 1ST HOSP IP/OBS MODERATE 55: CPT | Performed by: ORTHOPAEDIC SURGERY

## 2020-02-09 RX ADMIN — LORAZEPAM 2 MG: 1 TABLET ORAL at 06:12

## 2020-02-09 RX ADMIN — Medication 1 MG: at 20:36

## 2020-02-09 RX ADMIN — DULOXETINE HYDROCHLORIDE 60 MG: 60 CAPSULE, DELAYED RELEASE ORAL at 08:14

## 2020-02-09 RX ADMIN — FAMOTIDINE 20 MG: 20 TABLET, FILM COATED ORAL at 18:02

## 2020-02-09 RX ADMIN — SODIUM CHLORIDE 100 ML/HR: 9 INJECTION, SOLUTION INTRAVENOUS at 00:30

## 2020-02-09 RX ADMIN — SODIUM CHLORIDE 100 ML/HR: 9 INJECTION, SOLUTION INTRAVENOUS at 10:36

## 2020-02-09 RX ADMIN — Medication 400 MG: at 08:14

## 2020-02-09 RX ADMIN — ASPIRIN 81 MG: 81 TABLET, COATED ORAL at 08:14

## 2020-02-09 RX ADMIN — FAMOTIDINE 20 MG: 20 TABLET, FILM COATED ORAL at 08:14

## 2020-02-09 RX ADMIN — Medication 100 MG: at 08:14

## 2020-02-09 RX ADMIN — CARVEDILOL 12.5 MG: 12.5 TABLET, FILM COATED ORAL at 18:01

## 2020-02-09 RX ADMIN — FERROUS SULFATE TAB 325 MG (65 MG ELEMENTAL FE) 325 MG: 325 (65 FE) TAB at 08:14

## 2020-02-09 RX ADMIN — AMLODIPINE BESYLATE 10 MG: 10 TABLET ORAL at 08:14

## 2020-02-09 RX ADMIN — ATORVASTATIN CALCIUM 40 MG: 20 TABLET, FILM COATED ORAL at 20:36

## 2020-02-09 RX ADMIN — CARVEDILOL 12.5 MG: 12.5 TABLET, FILM COATED ORAL at 08:14

## 2020-02-09 RX ADMIN — FOLIC ACID 1 MG: 1 TABLET ORAL at 08:14

## 2020-02-09 NOTE — CONSULTS
"[facility] evaluated pt. Pt alert and able to answer questions. Pt states she has had several falls but did not explain them. Pt vague in her answers. Pt states she drinks \"two drinks a day\". When asked how she had a BAL of 414, pt states they were \"celebrating a birthday\" but was not clear on the details. Pt states she has had past tx for alcohol abuse. Pt states she was in an inpt place [place]rs [place] Pt states she then spent two yrs in outpt tx at the [facility]. This was a few yrs back according to pt. Pt not able to remember dates at this time. Pt states she went with one of her sisters who was struggling with prescription drug issues. Pt states they went to  together for a year until last Sept 2019. Pt states she had a sponsor at that time but no longer. Pt denies any seizures or hallucinations with past detox.     Pt denies any SI past or present. Pt rates her anxiety as a \"9\" and her depressed mood as a \"0\". Pt states she has taken Cymbalta 60mg for a while. Pt states she moved to the assisted living place two months ago. Pt states she previously lived with her sister. Pt states she has 6 sisters who are supportive. Pt states her sleep has been poor lately but appetite is good as she eats three meals a day at the facility. Pt has had strokes in the past. Pt states she had to quit her job as  due to a fall and shoulder surgery a year ago. Pt states she is in the process of applying for disability.     Pt is  two times with the last  having tried to \"slit my throat\". Pt has a 28 yr old son who lives in [place]. Pt states they keep in touch and he is her POA. Access talked with pt about follow up care. Access asked pt about returning to IOP and outpt mtgs at the [facility] as she felt it was helpful in the past. Pt noncommittal and states she doesn't drive. Access gave pt information sheet on transportation services thru Medicaid. "     Access will follow.

## 2020-02-09 NOTE — PLAN OF CARE
Problem: Patient Care Overview  Goal: Plan of Care Review  Outcome: Ongoing (interventions implemented as appropriate)  Flowsheets (Taken 2/9/2020 8654)  Progress: improving  Plan of Care Reviewed With: patient  Outcome Summary: Pt VS stable. Alert and oriented. pt states feeling much better. ortho MD counsult done. no new fracture.no need for inmmobilization. CIWA score is 1 at 1600pm. will continue to mointor.

## 2020-02-09 NOTE — PLAN OF CARE
"  Problem: Patient Care Overview  Goal: Plan of Care Review  Outcome: Ongoing (interventions implemented as appropriate)  Flowsheets (Taken 2/8/2020 2055)  Progress: no change  Plan of Care Reviewed With: patient; sibling  Outcome Summary: Pt from assisted living found passed out with a bottle of vodka. \"Banana bag\" given in ER. NS @ 100. Bedside swallow passed. Ethanol level 414. Pt had CT of head which shows no new changes and XRay and CT of R elbow which shows fx to R elbow. Waiting on ortho MD to see. YURIDIA ordered.      "

## 2020-02-09 NOTE — PROGRESS NOTES
"DAILY PROGRESS NOTE  [facility]    Patient Identification:  Name: Marylin Wilcox  Age: 59 y.o.  Sex: female  :  1960  MRN: [id number]         Primary Care Physician: Provider, No Known    Subjective: patient is feeling better today; splint is off her elbow; no visitors are present now  Interval History: follow up for hypertension, fall, alcohol intoxication, alcohol dependence     Objective:    Scheduled Meds:  amLODIPine 10 mg Oral Daily   aspirin 81 mg Oral Daily   atorvastatin 40 mg Oral Nightly   carvedilol 12.5 mg Oral BID With Meals   DULoxetine 60 mg Oral Daily   famotidine 20 mg Oral BID AC   ferrous sulfate 325 mg Oral Daily With Breakfast   folic acid 1 mg Oral Daily   magnesium oxide 400 mg Oral Daily   melatonin 1 mg Oral Nightly   thiamine 100 mg Oral Daily     Continuous Infusions:  sodium chloride 100 mL/hr Last Rate: 100 mL/hr (20 1036)       Vital signs in last 24 hours:  Temp:  [97.8 °F (36.6 °C)-98.1 °F (36.7 °C)] 97.9 °F (36.6 °C)  Heart Rate:  [87-97] 87  Resp:  [16-20] 18  BP: (143-165)/() 148/90    Intake/Output:    Intake/Output Summary (Last 24 hours) at 2020  Last data filed at 2020 1855  Gross per 24 hour   Intake 1080 ml   Output 1450 ml   Net -370 ml       Exam:  /90   Pulse 87   Temp 97.9 °F (36.6 °C) (Oral)   Resp 18   Ht 170.2 cm (67\")   Wt 72.6 kg (160 lb)   SpO2 96%   BMI 25.06 kg/m²     General Appearance:    Alert, cooperative, no distress, AAOx3; appears chronically ill and older than stated age                          Head:    Normocephalic, without obvious abnormality, atraumatic                           Eyes:    PERRL, conjunctiva/corneas clear, EOM's intact, both eyes                         Throat:   Lips, tongue, gums normal; oral mucosa pink and moist                           Neck:   Supple, symmetrical, trachea midline, no JVD                         Lungs:    Decreased breath sounds bilaterally, respirations " unlabored                 Chest Wall:    No tenderness or deformity                          Heart:    Regular rate and rhythm, S1 and S2 normal, no murmur,no  Rub                                      or gallop                  Abdomen:     Soft, non-tender, bowel sounds active, no masses, no                                                        organomegaly                  Extremities:   Extremities normal, atraumatic, no cyanosis or edema                        Pulses:   Pulses palpable in all extremities                            Skin:   Skin is warm and dry,  no rashes or palpable lesions                  Neurologic:   CNII-XII intact, motor strength grossly intact, sensation grossly                                         intact to light touch, no focal deficits noted       Data Review:  Labs in chart were reviewed.  WBC   Date Value Ref Range Status   02/08/2020 7.03 3.40 - 10.80 10*3/mm3 Final     Hemoglobin   Date Value Ref Range Status   02/08/2020 9.2 (L) 12.0 - 15.9 g/dL Final     Hematocrit   Date Value Ref Range Status   02/08/2020 28.5 (L) 34.0 - 46.6 % Final     Platelets   Date Value Ref Range Status   02/08/2020 351 140 - 450 10*3/mm3 Final     Sodium   Date Value Ref Range Status   02/08/2020 141 136 - 145 mmol/L Final     Potassium   Date Value Ref Range Status   02/08/2020 4.0 3.5 - 5.2 mmol/L Final     Chloride   Date Value Ref Range Status   02/08/2020 105 98 - 107 mmol/L Final     CO2   Date Value Ref Range Status   02/08/2020 20.1 (L) 22.0 - 29.0 mmol/L Final     BUN   Date Value Ref Range Status   02/08/2020 12 8 - 23 mg/dL Final     Creatinine   Date Value Ref Range Status   02/08/2020 1.13 (H) 0.57 - 1.00 mg/dL Final     Glucose   Date Value Ref Range Status   02/08/2020 109 (H) 65 - 99 mg/dL Final     Calcium   Date Value Ref Range Status   02/08/2020 8.7 8.6 - 10.5 mg/dL Final     Magnesium   Date Value Ref Range Status   02/08/2020 2.7 (H) 1.6 - 2.4 mg/dL Final     AST (SGOT)   Date  Value Ref Range Status   02/08/2020 23 1 - 32 U/L Final     ALT (SGPT)   Date Value Ref Range Status   02/08/2020 10 1 - 33 U/L Final     Alkaline Phosphatase   Date Value Ref Range Status   02/08/2020 97 39 - 117 U/L Final     Patient Active Problem List   Diagnosis Code   • Fall W19.XXXA       Assessment:    Fall  alcohol dependence  Alcohol intoxication  anemia  H/o cva  Plan:  Will continue to monitor  Recheck labs in the am  Elbow fracture is not new per ortho and splint is not needed  Recheck labs in am  Pt.ot to see  Unclear if she can return to her independent living  D.w patient and nurse  Strongly advised her to stop drinking alcohol  Access is following  Medium risk    Reynaalfredo Cao MD  2/9/2020  6:57 PM

## 2020-02-09 NOTE — PROGRESS NOTES
Orthopedic Consult      Patient: Marylin Wilcox    Date of Admission: 2/8/2020  7:57 AM    YOB: 1960    Medical Record Number: [id number]    Consulting Physician: Soren Cornejo MD    Chief Complaints: Right elbow injury    History of Present Illness: 59 y.o. female admitted to [facility] to services of Soren Cornejo MD with a right elbow injury.  She was admitted yesterday with alcohol intoxication.  No family members are at the bedside today.  The patient is somnolent but arousable.  She is able to answer questions appropriately.  She tells me that she has had a number of falls in recent months.  She thinks that she may have fallen a couple times last week and injured the elbow during 1 of these falls.  She is not sure.  She admits that she may have injured the elbow in the past as well.  She describes moderate aching pain over the back of the elbow.  She is in a splint and this does help with the discomfort.  Denies any other complaints or injuries.  She had a stroke that affected her left side.  She says that she is left-hand dominant.  She reports good use and function of the right arm prior to this injury.  She does live in independent living facility.    Allergies:   Allergies   Allergen Reactions   • Valsartan Nausea And Vomiting       Home Medications:    Current Facility-Administered Medications:   •  acetaminophen (TYLENOL) tablet 650 mg, 650 mg, Oral, Q6H PRN, Gloria Mathis APRN  •  amLODIPine (NORVASC) tablet 10 mg, 10 mg, Oral, Daily, Gloria Mathis APRN, 10 mg at 02/09/20 0814  •  aspirin EC tablet 81 mg, 81 mg, Oral, Daily, Gloria Mathis APRN, 81 mg at 02/09/20 0814  •  atorvastatin (LIPITOR) tablet 40 mg, 40 mg, Oral, Nightly, Gloria Mathis APRN, 40 mg at 02/08/20 2249  •  carvedilol (COREG) tablet 12.5 mg, 12.5 mg, Oral, BID With Meals, Gloria Mathis APRN, 12.5 mg at 02/09/20 0814  •  DULoxetine (CYMBALTA) DR capsule 60 mg,  60 mg, Oral, Daily, Gloria Mathis APRN, 60 mg at 02/09/20 0814  •  famotidine (PEPCID) tablet 20 mg, 20 mg, Oral, BID AC, Gloria Mathis APRN, 20 mg at 02/09/20 0814  •  ferrous sulfate tablet 325 mg, 325 mg, Oral, Daily With Breakfast, Gloria Mathis APRN, 325 mg at 02/09/20 0814  •  folic acid (FOLVITE) tablet 1 mg, 1 mg, Oral, Daily, Gloria Mathis APRN, 1 mg at 02/09/20 0814  •  LORazepam (ATIVAN) tablet 1 mg, 1 mg, Oral, Q2H PRN **OR** LORazepam (ATIVAN) injection 1 mg, 1 mg, Intravenous, Q2H PRN **OR** LORazepam (ATIVAN) tablet 2 mg, 2 mg, Oral, Q1H PRN, 2 mg at 02/09/20 0612 **OR** LORazepam (ATIVAN) injection 2 mg, 2 mg, Intravenous, Q1H PRN **OR** LORazepam (ATIVAN) injection 2 mg, 2 mg, Intravenous, Q15 Min PRN **OR** LORazepam (ATIVAN) injection 2 mg, 2 mg, Intramuscular, Q15 Min PRN, Reyna Cao MD  •  magnesium oxide (MAG-OX) tablet 400 mg, 400 mg, Oral, Daily, Gloria Mathis APRN, 400 mg at 02/09/20 0814  •  melatonin tablet 1 mg, 1 mg, Oral, Nightly, Gloria Mathis APRN, 1 mg at 02/08/20 2250  •  nitroglycerin (NITROSTAT) SL tablet 0.4 mg, 0.4 mg, Sublingual, Q5 Min PRN, Gloria Mathis APRN  •  ondansetron (ZOFRAN) tablet 4 mg, 4 mg, Oral, Q6H PRN **OR** ondansetron (ZOFRAN) injection 4 mg, 4 mg, Intravenous, Q6H PRN, Reyna Cao MD  •  ondansetron (ZOFRAN) tablet 4 mg, 4 mg, Oral, Q6H PRN, Gloria Mathis APRN  •  polyethylene glycol (MIRALAX) powder 17 g, 17 g, Oral, Daily PRN, Gloria Mathis APRN  •  [COMPLETED] Insert peripheral IV, , , Once **AND** sodium chloride 0.9 % flush 10 mL, 10 mL, Intravenous, PRN, Gustavo Alvarez MD  •  sodium chloride 0.9 % infusion, 100 mL/hr, Intravenous, Continuous, StingReyna stafford MD, Last Rate: 100 mL/hr at 02/09/20 0533, 100 mL/hr at 02/09/20 0533  •  thiamine (VITAMIN B-1) tablet 100 mg, 100 mg, Oral, Daily, Gloria Mathis APRN, 100 mg at 02/09/20 0814    Current  Medications:  Scheduled Meds:  amLODIPine 10 mg Oral Daily   aspirin 81 mg Oral Daily   atorvastatin 40 mg Oral Nightly   carvedilol 12.5 mg Oral BID With Meals   DULoxetine 60 mg Oral Daily   famotidine 20 mg Oral BID AC   ferrous sulfate 325 mg Oral Daily With Breakfast   folic acid 1 mg Oral Daily   magnesium oxide 400 mg Oral Daily   melatonin 1 mg Oral Nightly   thiamine 100 mg Oral Daily     Continuous Infusions:  sodium chloride 100 mL/hr Last Rate: 100 mL/hr (20 0533)     PRN Meds:.•  acetaminophen  •  LORazepam **OR** LORazepam **OR** LORazepam **OR** LORazepam **OR** LORazepam **OR** LORazepam  •  nitroglycerin  •  ondansetron **OR** ondansetron  •  ondansetron  •  polyethylene glycol  •  [COMPLETED] Insert peripheral IV **AND** sodium chloride    Past Medical History:   Diagnosis Date   • Alcohol abuse    • Allergic rhinitis    • Anemia    • Crohn's colitis (CMS/HCC)    • Depression    • GERD (gastroesophageal reflux disease)    • GI bleed    • Hepatitis C    • Hernia, diaphragmatic    • Hypertension    • IBS (irritable bowel syndrome)    • Iron deficiency    • Stroke (CMS/HCC)    • Syncope    • Syncope        History reviewed. No pertinent surgical history.    Social History     Occupational History   • Not on file   Tobacco Use   • Smoking status: Former Smoker     Last attempt to quit: 2019     Years since quittin.3   • Smokeless tobacco: Never Used   Substance and Sexual Activity   • Alcohol use: Yes     Alcohol/week: 14.0 standard drinks     Types: 14 Shots of liquor per week     Comment: 2 DRINKS/DAY SINCE NOV. GRADUALLY INCREASING   • Drug use: Not Currently   • Sexual activity: Defer    Social History     Social History Narrative   • Not on file       History reviewed. No pertinent family history.    Review of Systems:     Constitutional:  Denies fever, shaking or chills   Eyes:  Denies change in visual acuity   HEENT:  Denies nasal congestion or sore throat   Respiratory:  Denies  cough or shortness of breath   Cardiovascular:  Denies chest pain or edema  Endocrine: Denies tremors, palpitations, intolerance of heat or cold, polyuria, polydipsia.  GI:  Denies abdominal pain, nausea, vomiting, bloody stools or diarrhea  :  Denies frequency, urgency, incontinence, retention, or nocturia.  Musculoskeletal:  Denies numbness tingling or loss of motor function except as above  Integument:  Denies rash, lesion or ulceration   Neurologic:  Denies headache or focal weakness, deficits  Heme:  Denies epistaxis, spontaneous or excessive bleeding, hematuria, melena, fatigue, enlarged or tender lymph nodes.      All other pertinent positives and negatives as noted above in HPI.    Physical Exam: 59 y.o. female    Vitals:    02/08/20 1955 02/08/20 2248 02/08/20 2318 02/09/20 0737   BP: 148/85 148/85 143/91 (!) 165/101   BP Location: Left arm  Left arm Left arm   Patient Position: Lying  Lying Lying   Pulse: 97 95 90 93   Resp: 20  18 16   Temp:   98.1 °F (36.7 °C) 97.8 °F (36.6 °C)   TempSrc:   Oral Oral   SpO2: 95%  94%    Weight:       Height:         General:  Awake, alert. No acute distress.  Somnolent but arousable.  She is able to follow commands and answer questions.    Head/Neck:  Normocephalic, atraumatic.  Conjunctiva and sclera clear.  Hearing adequate for the exam.  Neck is supple with normal ROM.    Psych:  Affect and demeanor appropriate.  Otherwise, as above    CV:  Regular rate and rhythm.  Hemodynamically stable.    Lungs:  Good chest expansion, breathing unlabored.    Abdomen:  Soft.  Non-tender, non-distended.    Extremities: Right elbow: She had a splint in place that I removed.  Skin appears benign.  She has ecchymosis over the back of the elbow and a shallow abrasion.  Compartments soft without evidence for DVT or compartment syndrome.  No atrophy.  No palpable masses or adenopathy.  She has only mild tenderness at the elbow.  Her extensor mechanism has a defect at the olecranon  process and there are palpable bony fragments extending up along the back of the humerus.  This is completely nontender.  She is only tender in the area of ecchymosis just over the olecranon itself and into the forearm.  Again, this is mild.  She can nearly fully range the elbow without significant discomfort.  She can actively extend although it is weak relative to the other side.  Strength well-preserved distally with wrist flexion and extension,  and pinch.  Sensation to light touch grossly intact distally.  Good skin turgor, brisk cap refill and good pulses distally.    All other extremities atraumatic without gross abnormality.     Diagnostic Tests:    Admission on 02/08/2020   Component Date Value Ref Range Status   • Glucose 02/08/2020 109* 65 - 99 mg/dL Final   • BUN 02/08/2020 12  8 - 23 mg/dL Final   • Creatinine 02/08/2020 1.13* 0.57 - 1.00 mg/dL Final   • Sodium 02/08/2020 141  136 - 145 mmol/L Final   • Potassium 02/08/2020 4.0  3.5 - 5.2 mmol/L Final   • Chloride 02/08/2020 105  98 - 107 mmol/L Final   • CO2 02/08/2020 20.1* 22.0 - 29.0 mmol/L Final   • Calcium 02/08/2020 8.7  8.6 - 10.5 mg/dL Final   • Total Protein 02/08/2020 7.6  6.0 - 8.5 g/dL Final   • Albumin 02/08/2020 4.50  3.50 - 5.20 g/dL Final   • ALT (SGPT) 02/08/2020 10  1 - 33 U/L Final   • AST (SGOT) 02/08/2020 23  1 - 32 U/L Final   • Alkaline Phosphatase 02/08/2020 97  39 - 117 U/L Final   • Total Bilirubin 02/08/2020 0.2  0.2 - 1.2 mg/dL Final   • eGFR Non African Amer 02/08/2020 49* >60 mL/min/1.73 Final   • Globulin 02/08/2020 3.1  gm/dL Final   • A/G Ratio 02/08/2020 1.5  g/dL Final   • BUN/Creatinine Ratio 02/08/2020 10.6  7.0 - 25.0 Final   • Anion Gap 02/08/2020 15.9* 5.0 - 15.0 mmol/L Final   • Protime 02/08/2020 15.2* 11.7 - 14.2 Seconds Final   • INR 02/08/2020 1.23* 0.90 - 1.10 Final   • Troponin T 02/08/2020 <0.010  0.000 - 0.030 ng/mL Final   • Color, UA 02/08/2020 Yellow  Yellow, Straw Final   • Appearance, UA  02/08/2020 Clear  Clear Final   • pH, UA 02/08/2020 5.5  5.0 - 8.0 Final   • Specific Gravity, UA 02/08/2020 1.010  1.005 - 1.030 Final   • Glucose, UA 02/08/2020 Negative  Negative Final   • Ketones, UA 02/08/2020 Negative  Negative Final   • Bilirubin, UA 02/08/2020 Negative  Negative Final   • Blood, UA 02/08/2020 Negative  Negative Final   • Protein, UA 02/08/2020 Negative  Negative Final   • Leuk Esterase, UA 02/08/2020 Negative  Negative Final   • Nitrite, UA 02/08/2020 Negative  Negative Final   • Urobilinogen, UA 02/08/2020 0.2 E.U./dL  0.2 - 1.0 E.U./dL Final   • Magnesium 02/08/2020 2.7* 1.6 - 2.4 mg/dL Final   • Acetaminophen 02/08/2020 <5.0* 10.0 - 30.0 mcg/mL Final   • Ethanol 02/08/2020 414* 0 - 10 mg/dL Final   • Ethanol % 02/08/2020 0.414  % Final   • Amphet/Methamphet, Screen 02/08/2020 Negative  Negative Final   • Barbiturates Screen, Urine 02/08/2020 Negative  Negative Final   • Benzodiazepine Screen, Urine 02/08/2020 Negative  Negative Final   • Cocaine Screen, Urine 02/08/2020 Negative  Negative Final   • Opiate Screen 02/08/2020 Negative  Negative Final   • THC, Screen, Urine 02/08/2020 Negative  Negative Final   • Methadone Screen, Urine 02/08/2020 Negative  Negative Final   • Oxycodone Screen, Urine 02/08/2020 Negative  Negative Final   • Salicylate 02/08/2020 0.3  <=30.0 mg/dL Final   • WBC 02/08/2020 7.03  3.40 - 10.80 10*3/mm3 Final   • RBC 02/08/2020 2.94* 3.77 - 5.28 10*6/mm3 Final   • Hemoglobin 02/08/2020 9.2* 12.0 - 15.9 g/dL Final   • Hematocrit 02/08/2020 28.5* 34.0 - 46.6 % Final   • MCV 02/08/2020 96.9  79.0 - 97.0 fL Final   • MCH 02/08/2020 31.3  26.6 - 33.0 pg Final   • MCHC 02/08/2020 32.3  31.5 - 35.7 g/dL Final   • RDW 02/08/2020 15.3  12.3 - 15.4 % Final   • RDW-SD 02/08/2020 53.1  37.0 - 54.0 fl Final   • MPV 02/08/2020 8.5  6.0 - 12.0 fL Final   • Platelets 02/08/2020 351  140 - 450 10*3/mm3 Final   • Neutrophil % 02/08/2020 57.8  42.7 - 76.0 % Final   • Lymphocyte %  02/08/2020 30.7  19.6 - 45.3 % Final   • Monocyte % 02/08/2020 7.4  5.0 - 12.0 % Final   • Eosinophil % 02/08/2020 2.1  0.3 - 6.2 % Final   • Basophil % 02/08/2020 1.7* 0.0 - 1.5 % Final   • Immature Grans % 02/08/2020 0.3  0.0 - 0.5 % Final   • Neutrophils, Absolute 02/08/2020 4.06  1.70 - 7.00 10*3/mm3 Final   • Lymphocytes, Absolute 02/08/2020 2.16  0.70 - 3.10 10*3/mm3 Final   • Monocytes, Absolute 02/08/2020 0.52  0.10 - 0.90 10*3/mm3 Final   • Eosinophils, Absolute 02/08/2020 0.15  0.00 - 0.40 10*3/mm3 Final   • Basophils, Absolute 02/08/2020 0.12  0.00 - 0.20 10*3/mm3 Final   • Immature Grans, Absolute 02/08/2020 0.02  0.00 - 0.05 10*3/mm3 Final   • nRBC 02/08/2020 0.0  0.0 - 0.2 /100 WBC Final   • Creatine Kinase 02/08/2020 238* 20 - 180 U/L Final   • Glucose 02/08/2020 111  70 - 130 mg/dL Final     Lab Results (last 24 hours)     Procedure Component Value Units Date/Time    Urine Drug Screen - Urine, Clean Catch [[id number]]  (Normal) Collected:  02/08/20 0942    Specimen:  Urine, Clean Catch Updated:  02/08/20 1021     Amphet/Methamphet, Screen Negative     Barbiturates Screen, Urine Negative     Benzodiazepine Screen, Urine Negative     Cocaine Screen, Urine Negative     Opiate Screen Negative     THC, Screen, Urine Negative     Methadone Screen, Urine Negative     Oxycodone Screen, Urine Negative    Narrative:       Negative Thresholds For Drugs Screened:     Amphetamines               500 ng/ml   Barbiturates               200 ng/ml   Benzodiazepines            100 ng/ml   Cocaine                    300 ng/ml   Methadone                  300 ng/ml   Opiates                    300 ng/ml   Oxycodone                  100 ng/ml   THC                        50 ng/ml    The Normal Value for all drugs tested is negative. This report includes final unconfirmed screening results to be used for medical treatment purposes only. Unconfirmed results must not be used for non-medical purposes such as employment or  legal testing. Clinical consideration should be applied to any drug of abuse test, particulary when unconfirmed results are used.    Urinalysis With Culture If Indicated - Urine, Catheter [[id number]]  (Normal) Collected:  02/08/20 0942    Specimen:  Urine, Catheter Updated:  02/08/20 1001     Color, UA Yellow     Appearance, UA Clear     pH, UA 5.5     Specific Gravity, UA 1.010     Glucose, UA Negative     Ketones, UA Negative     Bilirubin, UA Negative     Blood, UA Negative     Protein, UA Negative     Leuk Esterase, UA Negative     Nitrite, UA Negative     Urobilinogen, UA 0.2 E.U./dL    Narrative:       Urine microscopic not indicated.    Protime-INR [[id number]]  (Abnormal) Collected:  02/08/20 0840    Specimen:  Blood Updated:  02/08/20 0912     Protime 15.2 Seconds      INR 1.23    Comprehensive Metabolic Panel [[id number]]  (Abnormal) Collected:  02/08/20 0840    Specimen:  Blood Updated:  02/08/20 0910     Glucose 109 mg/dL      BUN 12 mg/dL      Creatinine 1.13 mg/dL      Sodium 141 mmol/L      Potassium 4.0 mmol/L      Chloride 105 mmol/L      CO2 20.1 mmol/L      Calcium 8.7 mg/dL      Total Protein 7.6 g/dL      Albumin 4.50 g/dL      ALT (SGPT) 10 U/L      AST (SGOT) 23 U/L      Alkaline Phosphatase 97 U/L      Total Bilirubin 0.2 mg/dL      eGFR Non African Amer 49 mL/min/1.73      Globulin 3.1 gm/dL      A/G Ratio 1.5 g/dL      BUN/Creatinine Ratio 10.6     Anion Gap 15.9 mmol/L     Narrative:       GFR Normal >60  Chronic Kidney Disease <60  Kidney Failure <15      Troponin [[id number]]  (Normal) Collected:  02/08/20 0840    Specimen:  Blood Updated:  02/08/20 0910     Troponin T <0.010 ng/mL     Narrative:       Troponin T Reference Range:  <= 0.03 ng/mL-   Negative for AMI  >0.03 ng/mL-     Abnormal for myocardial necrosis.  Clinicians would have to utilize clinical acumen, EKG, Troponin and serial changes to determine if it is an Acute Myocardial Infarction or myocardial injury due to an underlying  chronic condition.       Results may be falsely decreased if patient taking Biotin.      Acetaminophen Level [[id number]]  (Abnormal) Collected:  02/08/20 0840    Specimen:  Blood Updated:  02/08/20 0910     Acetaminophen <5.0 mcg/mL     Ethanol [[id number]]  (Abnormal) Collected:  02/08/20 0840    Specimen:  Blood Updated:  02/08/20 0910     Ethanol 414 mg/dL      Ethanol % 0.414 %     Salicylate Level [[id number]]  (Normal) Collected:  02/08/20 0840    Specimen:  Blood Updated:  02/08/20 0910     Salicylate 0.3 mg/dL     Narrative:       Therapeutic range for Salicylates:  3.0 - 10.0 mg/dL for antipyretic/analgesic conditions  15.0 - 30.0 mg/dL for anti-inflammatory conditions    CK [[id number]]  (Abnormal) Collected:  02/08/20 0840    Specimen:  Blood Updated:  02/08/20 0910     Creatine Kinase 238 U/L     Magnesium [[id number]]  (Abnormal) Collected:  02/08/20 0840    Specimen:  Blood Updated:  02/08/20 0910     Magnesium 2.7 mg/dL         Imaging: AP and lateral views of the right elbow are reviewed on the [email or web address] system along with the associated report.  CT scan of the elbow is also reviewed along with the report.  The x-rays show a comminuted, displaced olecranon fracture.  There is rounding of the end of the olecranon and sclerosis around the margins of the displaced olecranon fragment consistent with a chronic fracture/nonunion.  The CT scan confirms the same.  She has a small effusion or hemarthrosis but no other acute abnormalities.    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  There is no telling how long this has been there.  Her pain is fairly minimal and there is no need for immobilization.  I recommend relative rest, icing and anti-inflammatories for her discomfort.  The pain should slowly get better.  She can follow-up as an outpatient.  Thank you for the consultation.  Please call with any questions or concerns.    Date:  2/9/2020    Willie Beal MD    CC: Soren Cornejo MD

## 2020-02-10 PROBLEM — F10.930 ALCOHOL WITHDRAWAL SYNDROME WITHOUT COMPLICATION (HCC): Status: ACTIVE | Noted: 2020-02-10

## 2020-02-10 PROBLEM — M17.12 OSTEOARTHRITIS OF LEFT KNEE: Status: ACTIVE | Noted: 2020-02-10

## 2020-02-10 PROBLEM — I10 ESSENTIAL HYPERTENSION: Status: ACTIVE | Noted: 2020-02-10

## 2020-02-10 PROBLEM — F10.939 ALCOHOL WITHDRAWAL (HCC): Status: ACTIVE | Noted: 2020-02-10

## 2020-02-10 LAB
ANION GAP SERPL CALCULATED.3IONS-SCNC: 14.7 MMOL/L (ref 5–15)
BUN BLD-MCNC: 7 MG/DL (ref 8–23)
BUN/CREAT SERPL: 8.6 (ref 7–25)
CALCIUM SPEC-SCNC: 8.3 MG/DL (ref 8.6–10.5)
CHLORIDE SERPL-SCNC: 105 MMOL/L (ref 98–107)
CO2 SERPL-SCNC: 18.3 MMOL/L (ref 22–29)
CREAT BLD-MCNC: 0.81 MG/DL (ref 0.57–1)
DEPRECATED RDW RBC AUTO: 50.6 FL (ref 37–54)
ERYTHROCYTE [DISTWIDTH] IN BLOOD BY AUTOMATED COUNT: 14.5 % (ref 12.3–15.4)
FERRITIN SERPL-MCNC: 79 NG/ML (ref 13–150)
GFR SERPL CREATININE-BSD FRML MDRD: 72 ML/MIN/1.73
GLUCOSE BLD-MCNC: 123 MG/DL (ref 65–99)
HCT VFR BLD AUTO: 22.7 % (ref 34–46.6)
HCT VFR BLD AUTO: 24.8 % (ref 34–46.6)
HGB BLD-MCNC: 7.3 G/DL (ref 12–15.9)
HGB BLD-MCNC: 7.9 G/DL (ref 12–15.9)
IRON 24H UR-MRATE: 33 MCG/DL (ref 37–145)
IRON SATN MFR SERPL: 11 % (ref 20–50)
MAGNESIUM SERPL-MCNC: 1.6 MG/DL (ref 1.6–2.4)
MCH RBC QN AUTO: 30.7 PG (ref 26.6–33)
MCHC RBC AUTO-ENTMCNC: 32.2 G/DL (ref 31.5–35.7)
MCV RBC AUTO: 95.4 FL (ref 79–97)
PLATELET # BLD AUTO: 231 10*3/MM3 (ref 140–450)
PMV BLD AUTO: 8.8 FL (ref 6–12)
POTASSIUM BLD-SCNC: 3.2 MMOL/L (ref 3.5–5.2)
POTASSIUM BLD-SCNC: 4 MMOL/L (ref 3.5–5.2)
RBC # BLD AUTO: 2.38 10*6/MM3 (ref 3.77–5.28)
SODIUM BLD-SCNC: 138 MMOL/L (ref 136–145)
TIBC SERPL-MCNC: 313 MCG/DL (ref 298–536)
TRANSFERRIN SERPL-MCNC: 210 MG/DL (ref 200–360)
TSH SERPL DL<=0.05 MIU/L-ACNC: 3.26 UIU/ML (ref 0.27–4.2)
WBC NRBC COR # BLD: 4.29 10*3/MM3 (ref 3.4–10.8)

## 2020-02-10 PROCEDURE — 83735 ASSAY OF MAGNESIUM: CPT | Performed by: INTERNAL MEDICINE

## 2020-02-10 PROCEDURE — 85027 COMPLETE CBC AUTOMATED: CPT | Performed by: INTERNAL MEDICINE

## 2020-02-10 PROCEDURE — 85018 HEMOGLOBIN: CPT | Performed by: INTERNAL MEDICINE

## 2020-02-10 PROCEDURE — 97535 SELF CARE MNGMENT TRAINING: CPT

## 2020-02-10 PROCEDURE — 97166 OT EVAL MOD COMPLEX 45 MIN: CPT

## 2020-02-10 PROCEDURE — 80048 BASIC METABOLIC PNL TOTAL CA: CPT | Performed by: INTERNAL MEDICINE

## 2020-02-10 PROCEDURE — 85014 HEMATOCRIT: CPT | Performed by: INTERNAL MEDICINE

## 2020-02-10 PROCEDURE — 84466 ASSAY OF TRANSFERRIN: CPT | Performed by: INTERNAL MEDICINE

## 2020-02-10 PROCEDURE — 97110 THERAPEUTIC EXERCISES: CPT

## 2020-02-10 PROCEDURE — 97162 PT EVAL MOD COMPLEX 30 MIN: CPT

## 2020-02-10 PROCEDURE — 84443 ASSAY THYROID STIM HORMONE: CPT | Performed by: INTERNAL MEDICINE

## 2020-02-10 PROCEDURE — 82728 ASSAY OF FERRITIN: CPT | Performed by: INTERNAL MEDICINE

## 2020-02-10 PROCEDURE — 84132 ASSAY OF SERUM POTASSIUM: CPT | Performed by: INTERNAL MEDICINE

## 2020-02-10 PROCEDURE — 83540 ASSAY OF IRON: CPT | Performed by: INTERNAL MEDICINE

## 2020-02-10 RX ORDER — MAGNESIUM SULFATE HEPTAHYDRATE 40 MG/ML
4 INJECTION, SOLUTION INTRAVENOUS AS NEEDED
Status: DISCONTINUED | OUTPATIENT
Start: 2020-02-10 | End: 2020-02-14 | Stop reason: HOSPADM

## 2020-02-10 RX ORDER — MAGNESIUM SULFATE HEPTAHYDRATE 40 MG/ML
2 INJECTION, SOLUTION INTRAVENOUS AS NEEDED
Status: DISCONTINUED | OUTPATIENT
Start: 2020-02-10 | End: 2020-02-14 | Stop reason: HOSPADM

## 2020-02-10 RX ORDER — HYDRALAZINE HYDROCHLORIDE 25 MG/1
25 TABLET, FILM COATED ORAL EVERY 6 HOURS PRN
Status: DISCONTINUED | OUTPATIENT
Start: 2020-02-10 | End: 2020-02-14 | Stop reason: HOSPADM

## 2020-02-10 RX ORDER — POTASSIUM CHLORIDE 7.45 MG/ML
10 INJECTION INTRAVENOUS
Status: DISCONTINUED | OUTPATIENT
Start: 2020-02-10 | End: 2020-02-14 | Stop reason: HOSPADM

## 2020-02-10 RX ORDER — POTASSIUM CHLORIDE 1.5 G/1.77G
40 POWDER, FOR SOLUTION ORAL AS NEEDED
Status: DISCONTINUED | OUTPATIENT
Start: 2020-02-10 | End: 2020-02-14 | Stop reason: HOSPADM

## 2020-02-10 RX ORDER — POTASSIUM CHLORIDE 750 MG/1
40 CAPSULE, EXTENDED RELEASE ORAL AS NEEDED
Status: DISCONTINUED | OUTPATIENT
Start: 2020-02-10 | End: 2020-02-14 | Stop reason: HOSPADM

## 2020-02-10 RX ADMIN — DULOXETINE HYDROCHLORIDE 60 MG: 60 CAPSULE, DELAYED RELEASE ORAL at 09:04

## 2020-02-10 RX ADMIN — ASPIRIN 81 MG: 81 TABLET, COATED ORAL at 09:06

## 2020-02-10 RX ADMIN — SODIUM CHLORIDE 100 ML/HR: 9 INJECTION, SOLUTION INTRAVENOUS at 14:47

## 2020-02-10 RX ADMIN — POTASSIUM CHLORIDE 40 MEQ: 750 CAPSULE, EXTENDED RELEASE ORAL at 14:42

## 2020-02-10 RX ADMIN — Medication 100 MG: at 09:04

## 2020-02-10 RX ADMIN — FAMOTIDINE 20 MG: 20 TABLET, FILM COATED ORAL at 09:06

## 2020-02-10 RX ADMIN — CARVEDILOL 12.5 MG: 12.5 TABLET, FILM COATED ORAL at 09:05

## 2020-02-10 RX ADMIN — Medication 400 MG: at 09:04

## 2020-02-10 RX ADMIN — FOLIC ACID 1 MG: 1 TABLET ORAL at 09:04

## 2020-02-10 RX ADMIN — POTASSIUM CHLORIDE 40 MEQ: 750 CAPSULE, EXTENDED RELEASE ORAL at 18:21

## 2020-02-10 RX ADMIN — FAMOTIDINE 20 MG: 20 TABLET, FILM COATED ORAL at 18:21

## 2020-02-10 RX ADMIN — ACETAMINOPHEN 650 MG: 325 TABLET, FILM COATED ORAL at 21:32

## 2020-02-10 RX ADMIN — CARVEDILOL 12.5 MG: 12.5 TABLET, FILM COATED ORAL at 18:21

## 2020-02-10 RX ADMIN — ACETAMINOPHEN 650 MG: 325 TABLET, FILM COATED ORAL at 09:04

## 2020-02-10 RX ADMIN — FERROUS SULFATE TAB 325 MG (65 MG ELEMENTAL FE) 325 MG: 325 (65 FE) TAB at 09:04

## 2020-02-10 RX ADMIN — AMLODIPINE BESYLATE 10 MG: 10 TABLET ORAL at 09:05

## 2020-02-10 RX ADMIN — Medication 1 MG: at 21:32

## 2020-02-10 RX ADMIN — ATORVASTATIN CALCIUM 40 MG: 20 TABLET, FILM COATED ORAL at 21:32

## 2020-02-10 NOTE — PROGRESS NOTES
Name: Marylin Wilcox ADMIT: 2020   : 1960  PCP: Provider, No Known    MRN: [id number] LOS: 0 days   AGE/SEX: 59 y.o. female  ROOM: [facility]/   Subjective   Chief Complaint   Patient presents with   • Alcohol Intoxication   • Fall     unwitnessed      Awake alert and feeling improved. Reporting no CP SOA NVD. Has left knee pain which is chronic and related to OA. She received cortisol injection previously and is followed by [facility].    Objective   Vital Signs  Temp:  [97.8 °F (36.6 °C)-98 °F (36.7 °C)] 98 °F (36.7 °C)  Heart Rate:  [78-93] 78  Resp:  [16-18] 18  BP: (148-164)/(90-98) 159/98  SpO2:  [96 %-99 %] 99 %  on   ;   Device (Oxygen Therapy): room air  Body mass index is 25.06 kg/m².    Physical Exam   Constitutional: She appears well-developed. No distress.   frail   HENT:   Head: Atraumatic.   Nose: Nose normal.   Eyes: Conjunctivae and EOM are normal.   Neck: Neck supple. No tracheal deviation present.   Cardiovascular: Normal rate and regular rhythm.   Pulmonary/Chest: Effort normal. She has no wheezes.   Abdominal: Soft. She exhibits no distension. There is no tenderness. There is no rebound and no guarding.   Musculoskeletal:   Left knee with crepitus and she reports chronic swelling compared to the right. Abrasion right forearm.   Neurological: She is alert. No cranial nerve deficit.   Skin: Skin is warm and dry. She is not diaphoretic.   Psychiatric: She has a normal mood and affect. Her behavior is normal.   Nursing note and vitals reviewed.      Results Review:       I reviewed the patient's new clinical results.     I reviewed imaging, agree with interpretation.     I reviewed telemetry/EKG results, sinus      Results from last 7 days   Lab Units 02/10/20  0403 20  0840   WBC 10*3/mm3 4.29 7.03   HEMOGLOBIN g/dL 7.3* 9.2*   PLATELETS 10*3/mm3 231 351     Results from last 7 days   Lab Units 02/10/20  0403 20  0840   SODIUM mmol/L 138 141   POTASSIUM mmol/L 3.2* 4.0    CHLORIDE mmol/L 105 105   CO2 mmol/L 18.3* 20.1*   BUN mg/dL 7* 12   CREATININE mg/dL 0.81 1.13*   GLUCOSE mg/dL 123* 109*   Estimated Creatinine Clearance: 84.7 mL/min (by C-G formula based on SCr of 0.81 mg/dL).  Results from last 7 days   Lab Units 02/10/20  0403 02/08/20  0840   CALCIUM mg/dL 8.3* 8.7   ALBUMIN g/dL  --  4.50   MAGNESIUM mg/dL 1.6 2.7*         amLODIPine 10 mg Oral Daily   aspirin 81 mg Oral Daily   atorvastatin 40 mg Oral Nightly   carvedilol 12.5 mg Oral BID With Meals   DULoxetine 60 mg Oral Daily   famotidine 20 mg Oral BID AC   ferrous sulfate 325 mg Oral Daily With Breakfast   folic acid 1 mg Oral Daily   magnesium oxide 400 mg Oral Daily   melatonin 1 mg Oral Nightly   thiamine 100 mg Oral Daily       sodium chloride 100 mL/hr Last Rate: 100 mL/hr (02/09/20 1036)   Diet Regular; Cardiac    Assessment/Plan      Active Hospital Problems    Diagnosis  POA   • **Alcohol withdrawal syndrome without complication (CMS/HCC) [F10.230]  Yes   • Essential hypertension [I10]  Yes   • Osteoarthritis of left knee [M17.12]  Yes   • Fall [W19.XXXA]  Yes      Resolved Hospital Problems   No resolved problems to display.       · Alcohol Intoxication/Withdrawal: Continue vitamin supplementation. Minimal Ativan req overnight. Access evaluated.  · Right Elbow Fracture: Chronic fracture with new contusion. Appreciate orthopedic evaluation.  · OA Left Knee: Follow up with home orthopedist.  · Fall  · Anemia: No overt GI losses. Will repeat HH to monitor and check lab workup. Suspicion is from chronic alcohol abuse.  · Dispostion: TBD/1-2 days    Stanislav Gonzalez MD  [facility]  02/10/20  10:18 AM    Dictated portions using Dragon dictation software.

## 2020-02-10 NOTE — THERAPY EVALUATION
Patient Name: Marylin Wilcox  : 1960    MRN: [id number]                              Today's Date: 2/10/2020       Admit Date: 2020    Visit Dx:     ICD-10-CM ICD-9-CM   1. Fall, initial encounter W19.XXXA E888.9   2. Acute alcoholic intoxication in alcoholism with complication (CMS/HCC) F10.229 303.00   3. Elbow fracture, right, closed, initial encounter S42.401A 812.40   4. Left hemiparesis (CMS/HCC) G81.94 342.90     Patient Active Problem List   Diagnosis   • Fall     Past Medical History:   Diagnosis Date   • Alcohol abuse    • Allergic rhinitis    • Anemia    • Crohn's colitis (CMS/HCC)    • Depression    • GERD (gastroesophageal reflux disease)    • GI bleed    • Hepatitis C    • Hernia, diaphragmatic    • Hypertension    • IBS (irritable bowel syndrome)    • Iron deficiency    • Stroke (CMS/HCC)    • Syncope    • Syncope      History reviewed. No pertinent surgical history.  General Information     Row Name 02/10/20 37          PT Evaluation Time/Intention    Document Type  evaluation  -AR     Mode of Treatment  physical therapy;individual therapy  -AR     Row Name 02/10/20 08          General Information    Patient Profile Reviewed?  yes  -AR     Prior Level of Function  min assist: ind. living facility, frequent falls, walks to dining mock, no AD, EtOH abuse   -AR     Existing Precautions/Restrictions  fall  -AR     Barriers to Rehab  none identified  -AR     Row Name 02/10/20 0837          Relationship/Environment    Lives With  facility resident  -AR     Row Name 02/10/20 08          Resource/Environmental Concerns    Current Living Arrangements  independent/assisted living facility  -AR     Row Name 02/10/20 08          Home Main Entrance    Number of Stairs, Main Entrance  none  -AR     Stair Railings, Main Entrance  none  -AR     Row Name 02/10/20 0837          Cognitive Assessment/Intervention- PT/OT    Orientation Status (Cognition)  oriented x 3  -AR     Row Name 02/10/20 08           Safety Issues, Functional Mobility    Safety Issues Affecting Function (Mobility)  insight into deficits/self awareness  -AR     Impairments Affecting Function (Mobility)  balance;endurance/activity tolerance;cognition;strength;pain  -AR       User Key  (r) = Recorded By, (t) = Taken By, (c) = Cosigned By    Initials Name Provider Type    AR Casandra Reich, PT Physical Therapist        Mobility     Row Name 02/10/20 0837          Bed Mobility Assessment/Treatment    Bed Mobility Assessment/Treatment  supine-sit;sit-supine  -AR     Supine-Sit [place] (Bed Mobility)  conditional independence  -AR     Sit-Supine [place] (Bed Mobility)  conditional independence  -AR     Assistive Device (Bed Mobility)  bed rails;head of bed elevated  -AR     Row Name 02/10/20 0837          Sit-Stand Transfer    Sit-Stand [place] (Transfers)  contact guard  -AR     Row Name 02/10/20 0837          Gait/Stairs Assessment/Training    Gait/Stairs Assessment/Training  gait/ambulation independence  -AR     [place] Level (Gait)  minimum assist (75% patient effort)  -AR     Assistive Device (Gait)  walker, front-wheeled  -AR     Distance in Feet (Gait)  60' declined further distance 2/2 fatigue, and L knee pain.  Cues for posture and safety w/ RW  -AR     Pattern (Gait)  swing-to;swing-through  -AR     Deviations/Abnormal Patterns (Gait)  ataxic;renetta decreased;festinating/shuffling  -AR     Bilateral Gait Deviations  forward flexed posture;heel strike decreased  -AR       User Key  (r) = Recorded By, (t) = Taken By, (c) = Cosigned By    Initials Name Provider Type    Casandra Terry, PT Physical Therapist        Obj/Interventions     Row Name 02/10/20 0839          General ROM    GENERAL ROM COMMENTS  B LE WFL  -AR     Row Name 02/10/20 0839          MMT (Manual Muscle Testing)    General MMT Comments  B LE -4/5, limited by pain in LLE  -AR     Row Name 02/10/20 0839          Therapeutic Exercise    Comment  (Therapeutic Exercise)  B AP, LAQ 10x  -AR     Row Name 02/10/20 0839          Static Sitting Balance    Level of [place] (Unsupported Sitting, Static Balance)  supervision  -AR     Sitting Position (Unsupported Sitting, Static Balance)  sitting on edge of bed  -AR     Time Able to Maintain Position (Unsupported Sitting, Static Balance)  3 to 4 minutes  -AR     Row Name 02/10/20 0839          Dynamic Sitting Balance    Level of [place], Reaches Outside Midline (Sitting, Dynamic Balance)  supervision  -AR     Sitting Position, Reaches Outside Midline (Sitting, Dynamic Balance)  sitting on edge of bed  -AR     Row Name 02/10/20 0839          Static Standing Balance    Level of [place] (Supported Standing, Static Balance)  minimal assist, 75% patient effort  -AR     Row Name 02/10/20 0839          Dynamic Standing Balance    Level of [place], Reaches Outside Midline (Standing, Dynamic Balance)  minimal assist, 75% patient effort  -AR     Assistive Device Utilized (Supported Standing, Dynamic Balance)  walker, rolling  -AR       User Key  (r) = Recorded By, (t) = Taken By, (c) = Cosigned By    Initials Name Provider Type    AR Casandra Reich, PT Physical Therapist        Goals/Plan     Row Name 02/10/20 0843          Transfer Goal 1 (PT)    Activity/Assistive Device (Transfer Goal 1, PT)  sit-to-stand/stand-to-sit  -AR     [place] Level/Cues Needed (Transfer Goal 1, PT)  supervision required  -AR     Time Frame (Transfer Goal 1, PT)  1 week  -AR     Row Name 02/10/20 0843          Gait Training Goal 1 (PT)    Activity/Assistive Device (Gait Training Goal 1, PT)  gait (walking locomotion);walker, rolling  -AR     [place] Level (Gait Training Goal 1, PT)  supervision required  -AR     Distance (Gait Goal 1, PT)  300  -AR     Time Frame (Gait Training Goal 1, PT)  1 week  -AR       User Key  (r) = Recorded By, (t) = Taken By, (c) = Cosigned By    Initials Name Provider Type    MARVA Reich  Casandra, PT Physical Therapist        Clinical Impression     Row Name 02/10/20 0841          Pain Assessment    Additional Documentation  Pain Scale: Numbers Pre/Post-Treatment (Group)  -AR     Row Name 02/10/20 0841          Pain Scale: Numbers Pre/Post-Treatment    Pain Scale: Numbers, Pretreatment  7/10  -AR     Pain Scale: Numbers, Post-Treatment  8/10  -AR     Pain Location - Side  Left  -AR     Pain Location  knee  -AR     Pain Intervention(s)  Repositioned  -AR     Row Name 02/10/20 0841          Plan of Care Review    Plan of Care Reviewed With  patient  -AR     Outcome Summary  Pt admitted 2/8 after fall at [facility] facility.  Pt reports walking to dining mock,  no AD, frequent falls, attributes these to L knee pain/buckling, per chart was found down w/ bottle of vodka.  H/o CVA.  Today pt demonstrates generalized weakness, impaired standing balance and gait pattern but able to ambulate 60' w/ min A using RW; declined further distance 2/2 knee pain and fatigue.  May benefit from DC to SNU.    -AR     Row Name 02/10/20 0841          Physical Therapy Clinical Impression    Patient/Family Goals Statement (PT Clinical Impression)  DC home  -AR     Criteria for Skilled Interventions Met (PT Clinical Impression)  yes  -AR     Rehab Potential (PT Clinical Summary)  good, to achieve stated therapy goals  -AR     Row Name 02/10/20 0841          Vital Signs    O2 Delivery Pre Treatment  room air  -AR     O2 Delivery Intra Treatment  room air  -AR     O2 Delivery Post Treatment  room air  -AR     Row Name 02/10/20 0841          Positioning and Restraints    Pre-Treatment Position  in bed  -AR     Post Treatment Position  -- pt declined chair  -AR     In Bed  supine;call light within reach  -AR       User Key  (r) = Recorded By, (t) = Taken By, (c) = Cosigned By    Initials Name Provider Type    Casandra Terry, PT Physical Therapist        Outcome Measures     Row Name 02/10/20 0844          How much help from  another person do you currently need...    Turning from your back to your side while in flat bed without using bedrails?  4  -AR     Moving from lying on back to sitting on the side of a flat bed without bedrails?  3  -AR     Moving to and from a bed to a chair (including a wheelchair)?  3  -AR     Standing up from a chair using your arms (e.g., wheelchair, bedside chair)?  3  -AR     Climbing 3-5 steps with a railing?  2  -AR     To walk in hospital room?  3  -AR     AM-PAC 6 Clicks Score (PT)  18  -AR     Row Name 02/10/20 0844          Functional Assessment    Outcome Measure Options  AM-PAC 6 Clicks Basic Mobility (PT)  -AR       User Key  (r) = Recorded By, (t) = Taken By, (c) = Cosigned By    Initials Name Provider Type    Casandra Terry, PT Physical Therapist        Physical Therapy Education                 Title: PT OT SLP Therapies (In Progress)     Topic: Physical Therapy (In Progress)     Point: Mobility training (In Progress)     Description:   Instruct learner(s) on safety and technique for assisting patient out of bed, chair or wheelchair.  Instruct in the proper use of assistive devices, such as walker, crutches, cane or brace.              Patient Friendly Description:   It's important to get you on your feet again, but we need to do so in a way that is safe for you. Falling has serious consequences, and your personal safety is the most important thing of all.        When it's time to get out of bed, one of us or a family member will sit next to you on the bed to give you support.     If your doctor or nurse tells you to use a walker, crutches, a cane, or a brace, be sure you use it every time you get out of bed, even if you think you don't need it.    Learning Progress Summary           Patient Acceptance, E, NR by AR at 2/10/2020 0844                   Point: Home exercise program (In Progress)     Description:   Instruct learner(s) on appropriate technique for monitoring, assisting and/or  progressing patient with therapeutic exercises and activities.              Learning Progress Summary           Patient Acceptance, E, NR by AR at 2/10/2020 0844                   Point: Body mechanics (In Progress)     Description:   Instruct learner(s) on proper positioning and spine alignment for patient and/or caregiver during mobility tasks and/or exercises.              Learning Progress Summary           Patient Acceptance, E, NR by AR at 2/10/2020 0844                   Point: Precautions (In Progress)     Description:   Instruct learner(s) on prescribed precautions during mobility and gait tasks              Learning Progress Summary           Patient Acceptance, E, NR by AR at 2/10/2020 0844                               User Key     Initials Effective Dates Name Provider Type Discipline    AR 04/03/18 -  Casandra Reich, PT Physical Therapist PT              PT Recommendation and Plan  Planned Therapy Interventions (PT Eval): balance training, bed mobility training, gait training, home exercise program, patient/family education, transfer training, ROM (range of motion), stair training, strengthening  Outcome Summary/Treatment Plan (PT)  Anticipated Discharge Disposition (PT): skilled nursing facility, assisted living facility (nursing home)  Plan of Care Reviewed With: patient  Outcome Summary: Pt admitted 2/8 after fall at ind. living facility.  Pt reports walking to dining mock,  no AD, frequent falls, attributes these to L knee pain/buckling, per chart was found down w/ bottle of vodka.  H/o CVA.  Today pt demonstrates generalized weakness, impaired standing balance and gait pattern but able to ambulate 60' w/ min A using RW; declined further distance 2/2 knee pain and fatigue.  May benefit from DC to SNU.       Time Calculation:   PT Charges     Row Name 02/10/20 0836             Time Calculation    Start Time  0818  -AR        User Key  (r) = Recorded By, (t) = Taken By, (c) = Cosigned By    Initials Name  Provider Type    AR Casandra Reich, PT Physical Therapist        Therapy Charges for Today     Code Description Service Date Service Provider Modifiers Qty    64302974425 HC PT EVAL MOD COMPLEXITY 2 2/10/2020 Casandra Reich, PT GP 1    90428270904 HC PT THER PROC EA 15 MIN 2/10/2020 Casandra Reich, PT GP 1          PT G-Codes  Outcome Measure Options: AM-PAC 6 Clicks Basic Mobility (PT)  AM-PAC 6 Clicks Score (PT): 18    Casandra Reich PT  2/10/2020

## 2020-02-10 NOTE — PLAN OF CARE
Problem: Patient Care Overview  Goal: Plan of Care Review  Outcome: Ongoing (interventions implemented as appropriate)  Flowsheets (Taken 2/10/2020 0361)  Progress: improving  Plan of Care Reviewed With: patient  Note:   Pt is a 59 year old female admitted d/t fall at home w/ alcohol withdrawal syndrome without complication; pt w/ recent h/o falls. Pt lives in [facility] where she reports indep w/ ADLs. Pt presents to OT eval w/ decreased balance, safety awareness, and decreased indep/safety w/ ADLs/transfers. Pt able to complete STS and stand pivot transfer to BSC w/ CGA. Pt able to complete LB dressing w/ s/u but requires CGA for toileting tasks. Pt slightly impulsive and demo decreased safety awareness skills. Pt may benefit from skilled OT services to address deficits and maximize indep and safety w/ ADLs/transfers.

## 2020-02-10 NOTE — PAYOR COMM NOTE
"Valentina Fung (59 y.o. Female)                                    ATTENTION;  [facility]A ,  DEPARTMENT  - REQUESTING PAPER AUTH ,                                MEDICAID ID# [id number],  PASSPORT ID #  PENDING , REPLY TO UR DEPT                                EL GAYTAN N  UR  [id number]         Date of Birth Social Security Number Address Home Phone MRN    1960  [place]  [address]  [place] [phone] [id number]    Tenriism Marital Status          None Single       Admission Date Admission Type Admitting Provider Attending Provider Department, Room/Bed    2/8/20 Emergency Soren Cornejo MD Baumann, Patrick D, MD [address], S420/1    Discharge Date Discharge Disposition Discharge Destination                       Attending Provider:  Stanislav Gonzalez MD    Allergies:  Valsartan    Isolation:  None   Infection:  None   Code Status:  CPR    Ht:  170.2 cm (67\")   Wt:  72.6 kg (160 lb)    Admission Cmt:  None   Principal Problem:  Alcohol withdrawal syndrome without complication (CMS/HCC) [F10.230]                 Active Insurance as of 2/8/2020     Primary Coverage     Payor Plan Insurance Group Employer/Plan Group    MEDICAID PENDING MEDICAID PENDING      Payor Plan Address Payor Plan Phone Number Payor Plan Fax Number Effective Dates    [facility]   2/8/2020 - None Entered    Subscriber Name Subscriber Birth Date Member ID       VALENTINA FUNG 1960                  Emergency Contacts      (Rel.) Home Phone Work Phone Mobile Phone    NANCY FUNG (Sister) [phone] -- --    NAVEEN ARTEAGA (Son) -- -- [phone]    VALE FUNG (Sister) -- -- [phone]    MELISSA BARRIOS (Sister) [phone] -- --               History & Physical      StinglReyna MD at 02/08/20 1656          HISTORY AND PHYSICAL   [facility]        Patient Identification:  Name: " Marylin Wilcox  Age: 59 y.o.  Sex: female  :  1960  MRN: [id number]                     Primary Care Physician: Provider, No Known    Chief Complaint:  59 year old female was brought in after a fall at home; she was found on the floor; she was confused and had a bottle of vodka; she is unsure of what happened and is still mildly confused; she has a history of prior hemorrhagic stroke and gi bleeding; she also has a history of alcohol dependence; two sisters are at the bedside; the patient lives in an independent living facility and has had four falls within the last week per family; the patient does not remember this;     History of Present Illness:   As above    Past Medical History:  Past Medical History:   Diagnosis Date   • Alcohol abuse    • Allergic rhinitis    • Anemia    • Crohn's colitis (CMS/HCC)    • Depression    • GERD (gastroesophageal reflux disease)    • GI bleed    • Hepatitis C    • Hernia, diaphragmatic    • Hypertension    • IBS (irritable bowel syndrome)    • Iron deficiency    • Stroke (CMS/HCC)    • Syncope    • Syncope      Past Surgical History:  History reviewed. No pertinent surgical history.   Home Meds:  Medications Prior to Admission   Medication Sig Dispense Refill Last Dose   • acetaminophen (TYLENOL) 325 MG tablet Take 650 mg by mouth Every 6 (Six) Hours As Needed for Mild Pain .      • amLODIPine (NORVASC) 10 MG tablet Take 10 mg by mouth Daily.      • aspirin 81 MG EC tablet Take 81 mg by mouth Daily.      • atorvastatin (LIPITOR) 40 MG tablet Take 40 mg by mouth Every Night.      • carvedilol (COREG) 12.5 MG tablet Take 12.5 mg by mouth 2 (Two) Times a Day With Meals.      • cetirizine (zyrTEC) 5 MG tablet Take 5 mg by mouth Daily.      • DULoxetine (CYMBALTA) 60 MG capsule Take 60 mg by mouth Daily.      • famotidine (PEPCID) 40 MG tablet Take 20 mg by mouth 2 (Two) Times a Day.      • ferrous sulfate 325 (65 FE) MG tablet Take 325 mg by mouth Daily With Breakfast.      •  folic acid (FOLVITE) 1 MG tablet Take 1 mg by mouth Daily.      • magnesium oxide (MAG-OX) 400 tablet tablet Take 400 mg by mouth Daily.      • melatonin 1 MG tablet Take 1 mg by mouth Every Night.      • Multiple Vitamins-Minerals (MULTIVITAMIN ADULT PO) Take  by mouth Daily.      • nitroglycerin (NITROSTAT) 0.4 MG SL tablet Place 0.4 mg under the tongue Every 5 (Five) Minutes As Needed for Chest Pain. Take no more than 3 doses in 15 minutes.      • ondansetron (ZOFRAN) 4 MG tablet Take 4 mg by mouth Every 6 (Six) Hours As Needed for Nausea or Vomiting.      • Polyethylene Glycol 3350 (MIRALAX PO) Take 17 g by mouth Daily As Needed.      • Pseudoephedrine-DM-GG (ROBAFEN CF PO) Take 10 mL by mouth 4 (Four) Times a Day As Needed.      • thiamine (VITAMIN B-1) 100 MG tablet Take 100 mg by mouth Daily.      • vitamin C (ASCORBIC ACID) 500 MG tablet Take 500 mg by mouth Daily.          Allergies:  Allergies   Allergen Reactions   • Valsartan Nausea And Vomiting     Immunizations:    There is no immunization history on file for this patient.  Social History:   Social History     Social History Narrative   • Not on file     Social History     Socioeconomic History   • Marital status: Single     Spouse name: Not on file   • Number of children: Not on file   • Years of education: Not on file   • Highest education level: Not on file   Tobacco Use   • Smoking status: Former Smoker     Last attempt to quit: 2019     Years since quittin.3   • Smokeless tobacco: Never Used   Substance and Sexual Activity   • Alcohol use: Yes     Alcohol/week: 14.0 standard drinks     Types: 14 Shots of liquor per week     Comment: 2 DRINKS/DAY SINCE NOV. GRADUALLY INCREASING   • Drug use: Not Currently   • Sexual activity: Defer       Family History:  History reviewed. No pertinent family history.     Review of Systems  Patient is confused and forgetful so cannot give an accurate ros.    Objective:  tMax 24 hrs: Temp (24hrs), Av.2  "°F (36.8 °C), Min:97.7 °F (36.5 °C), Max:98.8 °F (37.1 °C)    Vitals Ranges:   Temp:  [97.7 °F (36.5 °C)-98.8 °F (37.1 °C)] 97.7 °F (36.5 °C)  Heart Rate:  [60-94] 94  Resp:  [18-20] 20  BP: (114-140)/(73-94) 140/94      Exam:  /94 (BP Location: Left arm, Patient Position: Lying)   Pulse 94   Temp 97.7 °F (36.5 °C) (Oral)   Resp 20   Ht 170.2 cm (67\")   Wt 72.6 kg (160 lb)   SpO2 97%   BMI 25.06 kg/m²      General Appearance:     Alert, cooperative, no distress, appears older than stated age and anxious   Head:    Normocephalic, without obvious abnormality, atraumatic   Eyes:    PERRL, conjunctiva/corneas clear, EOM's intact, both eyes   Ears:    Normal external ear canals, both ears   Nose:   Nares normal, septum midline, mucosa normal, no drainage    or sinus tenderness   Throat:   Lips, mucosa, and tongue normal   Neck:   Supple, symmetrical, trachea midline, no adenopathy;     thyroid:  no enlargement/tenderness/nodules; no carotid    bruit or JVD   Back:     Symmetric, no curvature, ROM normal, no CVA tenderness   Lungs:     Clear to auscultation bilaterally, respirations unlabored   Chest Wall:    No tenderness or deformity    Heart:    Regular rate and rhythm, S1 and S2 normal, no murmur, rub   or gallop   Abdomen:     Soft, non-tender, bowel sounds active all four quadrants,     no masses, no hepatomegaly, no splenomegaly   Extremities:   Extremities normal, atraumatic, no cyanosis or edema; right arm with splint and ace wrap   Pulses:   2+ and symmetric all extremities   Skin:   Skin color, texture, turgor normal, no rashes or lesions   Lymph nodes:   Cervical, supraclavicular, and axillary nodes normal   Neurologic:   CNII-XII intact, normal strength, sensation intact throughout      .    Data Review:  Labs in chart were reviewed.  WBC   Date Value Ref Range Status   02/08/2020 7.03 3.40 - 10.80 10*3/mm3 Final     Hemoglobin   Date Value Ref Range Status   02/08/2020 9.2 (L) 12.0 - 15.9 g/dL " Final     Hematocrit   Date Value Ref Range Status   2020 28.5 (L) 34.0 - 46.6 % Final     Platelets   Date Value Ref Range Status   2020 351 140 - 450 10*3/mm3 Final     Sodium   Date Value Ref Range Status   2020 141 136 - 145 mmol/L Final     Potassium   Date Value Ref Range Status   2020 4.0 3.5 - 5.2 mmol/L Final     Chloride   Date Value Ref Range Status   2020 105 98 - 107 mmol/L Final     CO2   Date Value Ref Range Status   2020 20.1 (L) 22.0 - 29.0 mmol/L Final     BUN   Date Value Ref Range Status   2020 12 8 - 23 mg/dL Final     Creatinine   Date Value Ref Range Status   2020 1.13 (H) 0.57 - 1.00 mg/dL Final     Glucose   Date Value Ref Range Status   2020 109 (H) 65 - 99 mg/dL Final     Calcium   Date Value Ref Range Status   2020 8.7 8.6 - 10.5 mg/dL Final     Magnesium   Date Value Ref Range Status   2020 2.7 (H) 1.6 - 2.4 mg/dL Final     AST (SGOT)   Date Value Ref Range Status   2020 23 1 - 32 U/L Final     ALT (SGPT)   Date Value Ref Range Status   2020 10 1 - 33 U/L Final     Alkaline Phosphatase   Date Value Ref Range Status   2020 97 39 - 117 U/L Final                Imaging Results (All)     Procedure Component Value Units Date/Time    CT Upper Extremity Right Without Contrast [[id number]] Collected:  20 1317     Updated:  20 1321    Narrative:       CT RIGHT ELBOW WITHOUT CONTRAST     HISTORY: Recent fall. Right elbow fracture.     TECHNIQUE/FINDINGS: The CT scan was performed with thin axial images  followed by coronal and sagittal reconstructions and demonstrates the  followin. There is a fracture of the posterior half of the olecranon with  several fracture fragments extending posteriorly and superiorly with the  largest fracture fragment measuring up to 2.1 x 2.7 cm.  2. There may also be a minimal fracture involving the medial margin of  the medial humeral epicondyle. There is some  associated joint effusion  or hemarthrosis present.                 Radiation dose reduction techniques were utilized, including automated  exposure control and exposure modulation based on body size.     This report was finalized on 2/8/2020 1:18 PM by Dr. Joe Kwan M.D.       XR Elbow 2 View Right [[id number]] Collected:  02/08/20 0915     Updated:  02/08/20 0923    Narrative:       THREE-VIEW RIGHT ELBOW     HISTORY: Fell. Elbow pain.     FINDINGS: There are displaced fracture fragments involving the medial  epicondyle with superior displacement of the fracture fragments  measuring up to 4.5 cm. These fracture fragments are adjacent to the  posterior margin of the distal humerus and some of the fracture margins  appear fairly smooth and potentially related to long-standing injury and  clinical correlation is therefore required. I suspect there is also an  acute component with associated large joint effusion as well as some  soft tissue swelling in this region.     This report was finalized on 2/8/2020 9:20 AM by Dr. Joe Kwan M.D.       CT Head Without Contrast [[id number]] Collected:  02/08/20 0919     Updated:  02/08/20 0923    Narrative:       CT BRAIN WITHOUT CONTRAST     HISTORY: Fell. Confusion.     FINDINGS: The CT scan was performed as an emergency procedure through  the brain without contrast. There is mild diffuse atrophy and chronic  small vessel ischemic change. There is a localized area of  encephalomalacia in the right parietal region most consistent with old  infarct. There is no evidence of acute hemorrhage or mass effect and the  visualized sinuses are clear.                 Radiation dose reduction techniques were utilized, including automated  exposure control and exposure modulation based on body size.     This report was finalized on 2/8/2020 9:20 AM by Dr. Joe Kwan M.D.           Patient Active Problem List   Diagnosis Code   • Fall W19.XXXA       Assessment:    Fall  alcohol  "dependence  Right elbow fracture  H.o cva  anemia  Alcohol intoxication poa  Hepatitis c  Plan:  Initiate ciwa protocol  Monitor on telemetry due to likely alcohol withdrawal  D.w patient and sisters  Awaiting input from orthopedics regarding her fractured elbow  Monitor hgb  D.w family, nurse, patient  Will likely need a higher level of care after discharge  Access center to see as well    Reyna Cao MD  2/8/2020  4:56 PM      Electronically signed by Reyna Cao MD at 02/08/20 1702          Emergency Department Notes      Nancy Garcia, RN at 02/08/20 0752        Patient presents to er via ems from [facility].  Patient was observed on floor leaning on bed with a vodka bottle.  Last known normal yesterday around 1900.  Patient would like assistance with alcohol addiction.    Electronically signed by Nancy Garcia, RN at 02/08/20 0754     Gustavo Alvarez MD at 02/08/20 0804      Procedure Orders    1. Splint - Cast - Strapping [[id number]] ordered by Gustavo Alvarez MD at 02/08/20 1109                 EMERGENCY DEPARTMENT ENCOUNTER    Room Number:  33/33  Date of encounter:  2/8/2020  PCP: Provider, No Known  Historian: Patient and EMS      HPI:  Chief Complaint: Confusion  A complete HPI/ROS/PMH/PSH/SH/FH are unobtainable due to: Mental status change  Context: Marylin Wilcox is a 59 y.o. female who presents to the ED from [facility] after pt was found down on the floor this AM, confused and drowsy, with a bottle of vodka, per EMS. She is unsure why she was brought here by EMS and states she \"feels fine\". Hx of two strokes with residual L sided deficits. Her last stroke was in 2019. She states she drinks approx. 2 drinks per day.    MEDICAL HISTORY REVIEW  Pt was admitted to [facility] on 9/28/2019 for cerebral brain hemorrhage and GI hemorrhage.    PAST MEDICAL HISTORY  Active Ambulatory Problems     Diagnosis Date Noted   • No Active Ambulatory Problems "     Resolved Ambulatory Problems     Diagnosis Date Noted   • No Resolved Ambulatory Problems     No Additional Past Medical History         PAST SURGICAL HISTORY  No past surgical history on file.      FAMILY HISTORY  No family history on file.      SOCIAL HISTORY  Social History     Socioeconomic History   • Marital status: Single     Spouse name: Not on file   • Number of children: Not on file   • Years of education: Not on file   • Highest education level: Not on file         ALLERGIES  Valsartan        REVIEW OF SYSTEMS  Review of Systems   Unable to perform ROS: Mental status change   Psychiatric/Behavioral: Positive for confusion (per EMS).        All systems reviewed and negative except for those discussed in HPI.       PHYSICAL EXAM    I have reviewed the triage vital signs and nursing notes.    ED Triage Vitals [02/08/20 0756]   Temp Heart Rate Resp BP SpO2   98 °F (36.7 °C) 60 18 124/92 95 %      Temp src Heart Rate Source Patient Position BP Location FiO2 (%)   Tympanic Monitor -- -- --       Physical Exam    Physical Exam   Constitutional: No distress.   HENT:  Head: Normocephalic and atraumatic. Atraumatic calvarium.  Oropharynx: Mucous membranes are moist.   Eyes: No scleral icterus. Conjunctival pallor.  Neck: Painless range of motion noted. Neck supple.   Cardiovascular: Normal rate, regular rhythm and intact distal pulses.  Pulmonary/Chest: No respiratory distress. There are no wheezes, no rhonchi, and no rales.   Abdominal: Soft. There is no focal tenderness. There is no rebound and no guarding. She is mildly distended.  Musculoskeletal: There is no pedal edema or calf tenderness. Her external back is atraumatic. There is no midline tenderness to palpation of the C or T spine. She has painless ROM of the neck.  Neurological: Alert. Her speech is slightly garbled, but intelligible. She is able to identify herself and her sisters, at bedside, but is unable to identify the date or location. Baseline  sensation noted. She has inability to look to the left. She is only able to raise her L arm off the bed against gravity. She has mild contracture of the L hand. She is able to raise both legs off the bed, but her L is weaker (due to prior stroke).  Skin: Skin is pink, warm, and dry. No pallor. There is diffuse ecchymosis to the BUE with varying age. There is a large bruise to her R elbow.  Psychiatric: Mood and affect normal.   Nursing note and vitals reviewed.    LAB RESULTS  Recent Results (from the past 24 hour(s))   POC Glucose Once    Collection Time: 02/08/20  8:17 AM   Result Value Ref Range    Glucose 111 70 - 130 mg/dL   Comprehensive Metabolic Panel    Collection Time: 02/08/20  8:40 AM   Result Value Ref Range    Glucose 109 (H) 65 - 99 mg/dL    BUN 12 8 - 23 mg/dL    Creatinine 1.13 (H) 0.57 - 1.00 mg/dL    Sodium 141 136 - 145 mmol/L    Potassium 4.0 3.5 - 5.2 mmol/L    Chloride 105 98 - 107 mmol/L    CO2 20.1 (L) 22.0 - 29.0 mmol/L    Calcium 8.7 8.6 - 10.5 mg/dL    Total Protein 7.6 6.0 - 8.5 g/dL    Albumin 4.50 3.50 - 5.20 g/dL    ALT (SGPT) 10 1 - 33 U/L    AST (SGOT) 23 1 - 32 U/L    Alkaline Phosphatase 97 39 - 117 U/L    Total Bilirubin 0.2 0.2 - 1.2 mg/dL    eGFR Non African Amer 49 (L) >60 mL/min/1.73    Globulin 3.1 gm/dL    A/G Ratio 1.5 g/dL    BUN/Creatinine Ratio 10.6 7.0 - 25.0    Anion Gap 15.9 (H) 5.0 - 15.0 mmol/L   Protime-INR    Collection Time: 02/08/20  8:40 AM   Result Value Ref Range    Protime 15.2 (H) 11.7 - 14.2 Seconds    INR 1.23 (H) 0.90 - 1.10   Troponin    Collection Time: 02/08/20  8:40 AM   Result Value Ref Range    Troponin T <0.010 0.000 - 0.030 ng/mL   Magnesium    Collection Time: 02/08/20  8:40 AM   Result Value Ref Range    Magnesium 2.7 (H) 1.6 - 2.4 mg/dL   Acetaminophen Level    Collection Time: 02/08/20  8:40 AM   Result Value Ref Range    Acetaminophen <5.0 (L) 10.0 - 30.0 mcg/mL   Ethanol    Collection Time: 02/08/20  8:40 AM   Result Value Ref Range     Ethanol 414 (H) 0 - 10 mg/dL    Ethanol % 0.414 %   Salicylate Level    Collection Time: 02/08/20  8:40 AM   Result Value Ref Range    Salicylate 0.3 <=30.0 mg/dL   CBC Auto Differential    Collection Time: 02/08/20  8:40 AM   Result Value Ref Range    WBC 7.03 3.40 - 10.80 10*3/mm3    RBC 2.94 (L) 3.77 - 5.28 10*6/mm3    Hemoglobin 9.2 (L) 12.0 - 15.9 g/dL    Hematocrit 28.5 (L) 34.0 - 46.6 %    MCV 96.9 79.0 - 97.0 fL    MCH 31.3 26.6 - 33.0 pg    MCHC 32.3 31.5 - 35.7 g/dL    RDW 15.3 12.3 - 15.4 %    RDW-SD 53.1 37.0 - 54.0 fl    MPV 8.5 6.0 - 12.0 fL    Platelets 351 140 - 450 10*3/mm3    Neutrophil % 57.8 42.7 - 76.0 %    Lymphocyte % 30.7 19.6 - 45.3 %    Monocyte % 7.4 5.0 - 12.0 %    Eosinophil % 2.1 0.3 - 6.2 %    Basophil % 1.7 (H) 0.0 - 1.5 %    Immature Grans % 0.3 0.0 - 0.5 %    Neutrophils, Absolute 4.06 1.70 - 7.00 10*3/mm3    Lymphocytes, Absolute 2.16 0.70 - 3.10 10*3/mm3    Monocytes, Absolute 0.52 0.10 - 0.90 10*3/mm3    Eosinophils, Absolute 0.15 0.00 - 0.40 10*3/mm3    Basophils, Absolute 0.12 0.00 - 0.20 10*3/mm3    Immature Grans, Absolute 0.02 0.00 - 0.05 10*3/mm3    nRBC 0.0 0.0 - 0.2 /100 WBC   CK    Collection Time: 02/08/20  8:40 AM   Result Value Ref Range    Creatine Kinase 238 (H) 20 - 180 U/L       Ordered the above labs and independently reviewed the results.        RADIOLOGY  Xr Elbow 2 View Right    Result Date: 2/8/2020  THREE-VIEW RIGHT ELBOW  HISTORY: Fell. Elbow pain.  FINDINGS: There are displaced fracture fragments involving the medial epicondyle with superior displacement of the fracture fragments measuring up to 4.5 cm. These fracture fragments are adjacent to the posterior margin of the distal humerus and some of the fracture margins appear fairly smooth and potentially related to long-standing injury and clinical correlation is therefore required. I suspect there is also an acute component with associated large joint effusion as well as some soft tissue swelling in this  region.  This report was finalized on 2/8/2020 9:20 AM by Dr. Joe Kwan M.D.      Ct Head Without Contrast    Result Date: 2/8/2020  CT BRAIN WITHOUT CONTRAST  HISTORY: Fell. Confusion.  FINDINGS: The CT scan was performed as an emergency procedure through the brain without contrast. There is mild diffuse atrophy and chronic small vessel ischemic change. There is a localized area of encephalomalacia in the right parietal region most consistent with old infarct. There is no evidence of acute hemorrhage or mass effect and the visualized sinuses are clear.      Radiation dose reduction techniques were utilized, including automated exposure control and exposure modulation based on body size.  This report was finalized on 2/8/2020 9:20 AM by Dr. Joe Kwan M.D.        I ordered the above noted radiological studies. Reviewed by me and discussed with radiologist.  See dictation for official radiology interpretation.      PROCEDURES    Splint - Cast - Strapping  Date/Time: 2/8/2020 11:09 AM  Performed by: Gustavo Alvarez MD  Authorized by: Gustavo Alvarez MD     Consent:     Consent obtained:  Verbal    Consent given by:  Patient    Risks discussed:  Numbness and pain  Pre-procedure details:     Sensation:  Normal  Procedure details:     Laterality:  Right    Location:  Elbow    Elbow:  R elbow    Strapping: no      Splint type:  Long arm    Supplies:  Elastic bandage, Ortho-Glass, cotton padding and sling  Post-procedure details:     Pain:  Unchanged    Sensation:  Normal    Patient tolerance of procedure:  Tolerated well, no immediate complications        EKG           EKG time: 0826  Rhythm/Rate: NSR rate 70  P waves and NE: Prolonged NE segment  QRS, axis: Narrow QRS complex  ST and T waves:   No STEMI  QTC is within normal limits    Interpreted Contemporaneously by me, independently viewed  Comparison: No old for comparison.      MEDICATIONS GIVEN IN ER    Medications   sodium chloride 0.9 % flush 10 mL (has no  administration in time range)   multiple vitamin (M.V.I. Adult) 10 mL, thiamine (B-1) 100 mg, folic acid 1 mg, magnesium sulfate 2 g in sodium chloride 0.9 % 1,000 mL infusion (1,000 mL/hr Intravenous New Bag 2/8/20 0839)   acetaminophen (TYLENOL) tablet 1,000 mg (1,000 mg Oral Given 2/8/20 0942)         PROGRESS, DATA ANALYSIS, CONSULTS, AND MEDICAL DECISION MAKING    0801 Ordered labs, EKG, UDS, ethanol level, acetaminophen level, POC glucose, and CT head for further evaluation.    0813 Ordered XR R elbow for further evaluation.    0924 Per pt family, pt is c/o HA. Ordered tylenol. Placed call to [facility] for admission.    0925 Discussed results of pt CT and XR with radiologist, Dr Kwan.    0940 Discussed pt with Dr. Cao, on call for Dr. Cornejo, [facility], who agrees to admit. He requests ortho consult and admission for observation to telemetry.    1004 Rechecked with pt, who is resting comfortably, with two sisters at bedside. Pt is now more coherent at this time. She states that her R elbow has been bruised and tender for approx. 3 months. I have informed her and her family of the results of her work up and that she will need to be admitted for her fractured R elbow and ortho consult. She requires admission as she does not have much functionality of her L side to compensate with the R elbow fracture. Plan to admit after she is splinted. Pt and pt family understand and agree with the plan, all questions answered.    1108 Discussed pt with Dr. Beal, ortho, who will consult and requests a CT be obtained of the R elbow.    1110 Rechecked with pt and applied splint. Plan to admit as previously discussed.    All labs have been independently reviewed by me.  All radiology studies have been reviewed by me and discussed with radiologist dictating the report.   EKG's independently viewed and interpreted by me.  Discussion below represents my analysis of pertinent findings related to patient's condition, differential  diagnosis, treatment plan and final disposition.       AS OF 9:53 AM VITALS:    BP - 130/89  HR - 75  TEMP - 98 °F (36.7 °C) (Tympanic)  O2 SATS - 97%        DIAGNOSIS  Final diagnoses:   Fall, initial encounter   Acute alcoholic intoxication in alcoholism with complication (CMS/HCC)   Elbow fracture, right, closed, initial encounter   Left hemiparesis (CMS/[facility])         DISPOSITION  ADMISSION TO TELEMETRY    Discussed treatment plan and reason for admission with pt/family and admitting physician.  Pt/family voiced understanding of the plan for admission for further testing/treatment as needed.     Documentation assistance provided by giovanny Villatoro for Gustavo Alvarez MD.  Information recorded by the scribe was done at my direction and has been verified and validated by me.     Maine Villatoro  02/08/20 1116       Gustavo Alvarez MD  02/08/20 1154      Electronically signed by Gustavo Alvarez MD at 02/08/20 1154       CIWA (last 7 days)     Date/Time CIWA-Ar Score    02/10/20 1415  0    02/10/20 0900  0    02/09/20 2126  1    02/09/20 1600  1    02/09/20 1400  2    02/09/20 1200  4    02/09/20 0800  4    02/09/20 0600  13    02/09/20 0400  7    02/09/20 0032  0    02/08/20 2221  12    02/08/20 1700  0    02/08/20 1157  0          Facility-Administered Medications as of 2/10/2020   Medication Dose Route Frequency Provider Last Rate Last Dose   • [COMPLETED] acetaminophen (TYLENOL) tablet 1,000 mg  1,000 mg Oral Once Gustavo Alvarez MD   1,000 mg at 02/08/20 0942   • acetaminophen (TYLENOL) tablet 650 mg  650 mg Oral Q6H PRN Gloria Mathis APRN   650 mg at 02/10/20 0904   • amLODIPine (NORVASC) tablet 10 mg  10 mg Oral Daily Gloria Mathis APRN   10 mg at 02/10/20 0905   • aspirin EC tablet 81 mg  81 mg Oral Daily Gloria Mathis APRN   81 mg at 02/10/20 0906   • atorvastatin (LIPITOR) tablet 40 mg  40 mg Oral Nightly Gloria Mathis APRN   40 mg at 02/09/20 2036   • carvedilol (COREG) tablet  12.5 mg  12.5 mg Oral BID With Meals Gloria Mathis APRN   12.5 mg at 02/10/20 0905   • DULoxetine (CYMBALTA) DR capsule 60 mg  60 mg Oral Daily Gloria Mathis APRN   60 mg at 02/10/20 0904   • famotidine (PEPCID) tablet 20 mg  20 mg Oral BID AC Gloria Mathis APRN   20 mg at 02/10/20 0906   • ferrous sulfate tablet 325 mg  325 mg Oral Daily With Breakfast Gloria Mathis APRN   325 mg at 02/10/20 0904   • folic acid (FOLVITE) tablet 1 mg  1 mg Oral Daily Gloria Mathis APRN   1 mg at 02/10/20 0904   • LORazepam (ATIVAN) tablet 1 mg  1 mg Oral Q2H PRN StingReyna stafford MD        Or   • LORazepam (ATIVAN) injection 1 mg  1 mg Intravenous Q2H PRN StinglReyna MD        Or   • LORazepam (ATIVAN) tablet 2 mg  2 mg Oral Q1H PRN StingReyna stafford MD   2 mg at 02/09/20 0612    Or   • LORazepam (ATIVAN) injection 2 mg  2 mg Intravenous Q1H PRN Reyna Cao MD        Or   • LORazepam (ATIVAN) injection 2 mg  2 mg Intravenous Q15 Min PRN Reyna Cao MD        Or   • LORazepam (ATIVAN) injection 2 mg  2 mg Intramuscular Q15 Min PRN Reyna Cao MD       • magnesium oxide (MAG-OX) tablet 400 mg  400 mg Oral Daily Gloria Mathis APRN   400 mg at 02/10/20 0904   • Magnesium Sulfate 2 gram Bolus, followed by 8 gram infusion (total Mg dose 10 grams)- Mg less than or equal to 1mg/dL  2 g Intravenous PRN Stanislav Gonzalez MD        Or   • Magnesium Sulfate 2 gram / 50mL Infusion (GIVE X 3 BAGS TO EQUAL 6GM TOTAL DOSE) - Mg 1.1 - 1.5 mg/dl  2 g Intravenous PRN Stanislav Gonzalez MD        Or   • Magnesium Sulfate 4 gram infusion- Mg 1.6-1.9 mg/dL  4 g Intravenous PRN Stanislav Gonzalez MD       • melatonin tablet 1 mg  1 mg Oral Nightly Gloria Mathis APRN   1 mg at 02/09/20 2036   • [COMPLETED] multiple vitamin (M.V.I. Adult) 10 mL, thiamine (B-1) 100 mg, folic acid 1 mg, magnesium sulfate 2 g in sodium chloride 0.9 % 1,000 mL  infusion  1,000 mL/hr Intravenous Once Gustavo Alvarez MD   Stopped at 02/08/20 1041   • nitroglycerin (NITROSTAT) SL tablet 0.4 mg  0.4 mg Sublingual Q5 Min PRN Gloria Mathis APRN       • ondansetron (ZOFRAN) tablet 4 mg  4 mg Oral Q6H PRN Reyna Cao MD        Or   • ondansetron (ZOFRAN) injection 4 mg  4 mg Intravenous Q6H PRN Reyna Cao MD       • ondansetron (ZOFRAN) tablet 4 mg  4 mg Oral Q6H PRN Gloria Mathis APRN       • polyethylene glycol (MIRALAX) powder 17 g  17 g Oral Daily PRN Gloria Mathis APRN       • potassium chloride (MICRO-K) CR capsule 40 mEq  40 mEq Oral PRN Stanislav Gonzalez MD   40 mEq at 02/10/20 1442    Or   • potassium chloride (KLOR-CON) packet 40 mEq  40 mEq Oral PRN Stanislav Gonzalez MD        Or   • potassium chloride 10 mEq in 100 mL IVPB  10 mEq Intravenous Q1H PRN Stanislav Gonzalez MD       • sodium chloride 0.9 % flush 10 mL  10 mL Intravenous PRN Gustavo Alvarez MD       • sodium chloride 0.9 % infusion  100 mL/hr Intravenous Continuous Reyna Cao  mL/hr at 02/10/20 1447 100 mL/hr at 02/10/20 1447   • thiamine (VITAMIN B-1) tablet 100 mg  100 mg Oral Daily Gloria Mathis APRN   100 mg at 02/10/20 0904     Orders (last 7 days)      Start     Ordered    02/11/20 0600  Vitamin B12  Morning Draw      02/10/20 0858    02/11/20 0600  Folate  Morning Draw      02/10/20 0858    02/10/20 1600  Hemoglobin & Hematocrit, Blood  Once      02/10/20 0858    02/10/20 1435  Inpatient Admission  Once      02/10/20 1435    02/10/20 1025  Occult Blood X 1, Stool - Stool, Per Rectum  Once      02/10/20 1024    02/10/20 0859  Place Sequential Compression Device  Once      02/10/20 0858    02/10/20 0859  Maintain Sequential Compression Device  Continuous      02/10/20 0858    02/10/20 0859  Patient Currently On Electrolyte Replacement Protocol - Please Refer to MAR for Protocol Details  Misc Nursing Order (Specify)  Daily      Comments:  Patient Currently On Electrolyte Replacement Protocol - Please Refer to MAR for Protocol Details    02/10/20 0858    02/10/20 0858  Magnesium Sulfate 2 gram Bolus, followed by 8 gram infusion (total Mg dose 10 grams)- Mg less than or equal to 1mg/dL  As Needed      02/10/20 0858    02/10/20 0858  Magnesium Sulfate 2 gram / 50mL Infusion (GIVE X 3 BAGS TO EQUAL 6GM TOTAL DOSE) - Mg 1.1 - 1.5 mg/dl  As Needed      02/10/20 0858    02/10/20 0858  Magnesium Sulfate 4 gram infusion- Mg 1.6-1.9 mg/dL  As Needed      02/10/20 0858    02/10/20 0858  potassium chloride (MICRO-K) CR capsule 40 mEq  As Needed      02/10/20 0858    02/10/20 0858  potassium chloride (KLOR-CON) packet 40 mEq  As Needed      02/10/20 0858    02/10/20 0858  potassium chloride 10 mEq in 100 mL IVPB  Every 1 Hour PRN      02/10/20 0858    02/10/20 0858  Ferritin  Once      02/10/20 0858    02/10/20 0858  TSH Rfx On Abnormal To Free T4  Once      02/10/20 0858    02/10/20 0858  Code Status and Medical Interventions:  Continuous      02/10/20 0858    02/10/20 0857  Iron Profile  Once      02/10/20 0858    02/10/20 0600  Basic Metabolic Panel  Morning Draw      02/09/20 1857    02/10/20 0600  CBC (No Diff)  Morning Draw      02/09/20 1857    02/10/20 0600  Magnesium  Morning Draw      02/09/20 1857 02/09/20 1857  OT Consult: Eval & Treat  Once      02/09/20 1857 02/09/20 1857  PT Consult: Eval & Treat As Tolerated; Discharge Placement Assessment  Once      02/09/20 1857 02/09/20 1107  Diet Regular; Cardiac  Diet Effective Now      02/09/20 1106    02/09/20 0900  amLODIPine (NORVASC) tablet 10 mg  Daily      02/08/20 2149 02/09/20 0900  aspirin EC tablet 81 mg  Daily      02/08/20 2149 02/09/20 0900  DULoxetine (CYMBALTA) DR capsule 60 mg  Daily      02/08/20 2149 02/09/20 0900  folic acid (FOLVITE) tablet 1 mg  Daily      02/08/20 2149 02/09/20 0900  magnesium oxide (MAG-OX) tablet 400 mg  Daily      02/08/20 2149     02/09/20 0900  thiamine (VITAMIN B-1) tablet 100 mg  Daily      02/08/20 2149 02/09/20 0800  ferrous sulfate tablet 325 mg  Daily With Breakfast      02/08/20 2149 02/09/20 0730  famotidine (PEPCID) tablet 20 mg  2 Times Daily Before Meals      02/08/20 2149 02/08/20 2245  atorvastatin (LIPITOR) tablet 40 mg  Nightly      02/08/20 2149 02/08/20 2245  carvedilol (COREG) tablet 12.5 mg  2 Times Daily With Meals      02/08/20 2149 02/08/20 2245  melatonin tablet 1 mg  Nightly      02/08/20 2149 02/08/20 2148  polyethylene glycol (MIRALAX) powder 17 g  Daily PRN      02/08/20 2149 02/08/20 2148  ondansetron (ZOFRAN) tablet 4 mg  Every 6 Hours PRN      02/08/20 2149 02/08/20 2148  nitroglycerin (NITROSTAT) SL tablet 0.4 mg  Every 5 Minutes PRN      02/08/20 2149 02/08/20 2148  acetaminophen (TYLENOL) tablet 650 mg  Every 6 Hours PRN      02/08/20 2149 02/08/20 1815  sodium chloride 0.9 % infusion  Continuous      02/08/20 1719 02/08/20 1719  Inpatient Access Center Consult  Once     Provider:  (Not yet assigned)    02/08/20 1719 02/08/20 1719  Fall Precautions  Continuous      02/08/20 1719 02/08/20 1717  ondansetron (ZOFRAN) tablet 4 mg  Every 6 Hours PRN      02/08/20 1719 02/08/20 1717  ondansetron (ZOFRAN) injection 4 mg  Every 6 Hours PRN      02/08/20 1719 02/08/20 1717  LORazepam (ATIVAN) tablet 1 mg  Every 2 Hours PRN      02/08/20 1719 02/08/20 1717  LORazepam (ATIVAN) injection 1 mg  Every 2 Hours PRN      02/08/20 1719 02/08/20 1717  LORazepam (ATIVAN) tablet 2 mg  Every 1 Hour PRN      02/08/20 1719 02/08/20 1717  LORazepam (ATIVAN) injection 2 mg  Every 1 Hour PRN      02/08/20 1719    02/08/20 1717  LORazepam (ATIVAN) injection 2 mg  Every 15 Minutes PRN      02/08/20 1719    02/08/20 1717  LORazepam (ATIVAN) injection 2 mg  Every 15 Minutes PRN      02/08/20 1719    02/08/20 1716  Safety Precautions  Continuous      02/08/20 1719    02/08/20 1716  Vital  Signs  Per Hospital Policy      02/08/20 1719    02/08/20 1716  Continuous Pulse Oximetry  Continuous      02/08/20 1719    02/08/20 1716  Obtain Baseline Clinical [place] Withdrawal Assessment - Ar, Sedation Scale & Vital Signs  Once     Comments:  Follow CIWA Scoring Reference Guide    02/08/20 1719    02/08/20 1716  Clinical [place] Withdrawal Assessment (CIWA-Ar)  Per Hospital Policy      02/08/20 1719    02/08/20 1716  If CIWA Score Less Than 8 Monitor Every 4 Hours & Then Discontinue Assessment - Restart Scoring Assessment & Protocol If Symptoms Reappear  Continuous      02/08/20 1719    02/08/20 1716  Obtain Pre & Post Sedation Scores With Every Lorazepam Dose - Hold For POSS Greater Than 2 or RASS of -3 or Less  Continuous      02/08/20 1719    02/08/20 1716  Seizure Precautions  Continuous      02/08/20 1719    02/08/20 1716  Notify Provider - Withdrawal  Until Discontinued      02/08/20 1719    02/08/20 1716  Notify Provider of Abnormal Lab Results  Until Discontinued      02/08/20 1719    02/08/20 1716  Notify Provider - Vitals  Until Discontinued      02/08/20 1719    02/08/20 1350  Inpatient Case Management  Consult  Once     Provider:  (Not yet assigned)    02/08/20 1350    02/08/20 1110  Splint Cast Strapping  Once     Comments:  This order was created via procedure documentation    02/08/20 1109    02/08/20 1109  CT Upper Extremity Right Without Contrast  1 Time Imaging      02/08/20 1109    02/08/20 0944  Initiate Observation Status  Once      02/08/20 0943    02/08/20 0944  Tele Bed Request  Once      02/08/20 0943    02/08/20 0942  Ortho (on-call MD unless specified)  Once     Specialty:  Orthopedic Surgery  Provider:  (Not yet assigned)    02/08/20 0942    02/08/20 0926  acetaminophen (TYLENOL) tablet 1,000 mg  Once      02/08/20 0924    02/08/20 0925  [facility] (on-call MD unless specified) Details  Once     Specialty:  Hospitalist  Provider:  (Not yet assigned)    02/08/20 0924     02/08/20 0925  Obtain & Apply The Following- Upper extremity; Sling  Once      02/08/20 0925    02/08/20 0844  XR Elbow 2 View Right  1 Time Imaging      02/08/20 0813    02/08/20 0820  POC Glucose Once  Once      02/08/20 0817    02/08/20 0814  POC Glucose STAT  STAT      02/08/20 0813    02/08/20 0814  XR Elbow 3+ View Right  1 Time Imaging,   Status:  Canceled      02/08/20 0813    02/08/20 0809  CT Head Without Contrast  1 Time Imaging      02/08/20 0808    02/08/20 0806  CK  STAT      02/08/20 0805    02/08/20 0803  multiple vitamin (M.V.I. Adult) 10 mL, thiamine (B-1) 100 mg, folic acid 1 mg, magnesium sulfate 2 g in sodium chloride 0.9 % 1,000 mL infusion  Once      02/08/20 0801    02/08/20 0802  Urinalysis With Culture If Indicated - Urine, Catheter  Once      02/08/20 0801    02/08/20 0802  Magnesium  STAT      02/08/20 0801    02/08/20 0802  Monitor Blood Pressure  Per Hospital Policy      02/08/20 0801    02/08/20 0802  Cardiac Monitoring  Once      02/08/20 0801    02/08/20 0802  Pulse Oximetry, Continuous  Continuous,   Status:  Canceled      02/08/20 0801    02/08/20 0802  Acetaminophen Level  Once      02/08/20 0801    02/08/20 0802  Ethanol  Once      02/08/20 0801    02/08/20 0802  Urine Drug Screen - Urine, Clean Catch  Once      02/08/20 0801    02/08/20 0802  Salicylate Level  Once      02/08/20 0801    02/08/20 0801  CBC & Differential  Once      02/08/20 0801    02/08/20 0801  Comprehensive Metabolic Panel  Once      02/08/20 0801    02/08/20 0801  Protime-INR  Once      02/08/20 0801    02/08/20 0801  Troponin  Once      02/08/20 0801    02/08/20 0801  ECG 12 Lead  Once      02/08/20 0801    02/08/20 0801  Insert peripheral IV  Once      02/08/20 0801    02/08/20 0801  CBC Auto Differential  PROCEDURE ONCE      02/08/20 0801    02/08/20 0800  sodium chloride 0.9 % flush 10 mL  As Needed      02/08/20 0801    Unscheduled  Magnesium  As Needed      02/10/20 0858    Unscheduled  Potassium  As  Needed      02/10/20 0858    --  amLODIPine (NORVASC) 10 MG tablet  Daily      20 1041    --  aspirin 81 MG EC tablet  Daily      20 104    --  cetirizine (zyrTEC) 5 MG tablet  Daily      20 104    --  DULoxetine (CYMBALTA) 60 MG capsule  Daily      20 1041    --  ferrous sulfate 325 (65 FE) MG tablet  Daily With Breakfast      20 104    --  folic acid (FOLVITE) 1 MG tablet  Daily      20 104    --  magnesium oxide (MAG-OX) 400 tablet tablet  Daily      20 104    --  Multiple Vitamins-Minerals (MULTIVITAMIN ADULT PO)  Daily      20 104    --  vitamin C (ASCORBIC ACID) 500 MG tablet  Daily      20 104    --  carvedilol (COREG) 12.5 MG tablet  2 Times Daily With Meals      20 104    --  famotidine (PEPCID) 40 MG tablet  2 Times Daily      20 104    --  thiamine (VITAMIN B-1) 100 MG tablet  Daily      20 104    --  atorvastatin (LIPITOR) 40 MG tablet  Nightly      20 104    --  acetaminophen (TYLENOL) 325 MG tablet  Every 6 Hours PRN      20 104    --  Polyethylene Glycol 3350 (MIRALAX PO)  Daily PRN      20 1041    --  nitroglycerin (NITROSTAT) 0.4 MG SL tablet  Every 5 Minutes PRN      20 1041    --  ondansetron (ZOFRAN) 4 MG tablet  Every 6 Hours PRN      20 1041    --  Pseudoephedrine-DM-GG (ROBAFEN CF PO)  4 Times Daily PRN,   Status:  Discontinued      20 1041    --  melatonin 1 MG tablet  Nightly      20 1057    --  dextromethorphan-guaifenesin (ROBITUSSIN-DM)  MG/5ML syrup  4 Times Daily PRN      20 1711                   Physician Progress Notes (last 7 days) (Notes from 20 1454 through 02/10/20 1454)      Stanislav Gonzalez MD at 02/10/20 1018              Name: Marylin DUMONTIT: 2020   : 1960  PCP: Provider, No Known    MRN: [id number] LOS: 0 days   AGE/SEX: 59 y.o. female  ROOM: [facility]   Subjective   Chief Complaint   Patient presents with   • Alcohol  Intoxication   • Fall     unwitnessed      Awake alert and feeling improved. Reporting no CP SOA NVD. Has left knee pain which is chronic and related to OA. She received cortisol injection previously and is followed by [facility].    Objective   Vital Signs  Temp:  [97.8 °F (36.6 °C)-98 °F (36.7 °C)] 98 °F (36.7 °C)  Heart Rate:  [78-93] 78  Resp:  [16-18] 18  BP: (148-164)/(90-98) 159/98  SpO2:  [96 %-99 %] 99 %  on   ;   Device (Oxygen Therapy): room air  Body mass index is 25.06 kg/m².    Physical Exam   Constitutional: She appears well-developed. No distress.   frail   HENT:   Head: Atraumatic.   Nose: Nose normal.   Eyes: Conjunctivae and EOM are normal.   Neck: Neck supple. No tracheal deviation present.   Cardiovascular: Normal rate and regular rhythm.   Pulmonary/Chest: Effort normal. She has no wheezes.   Abdominal: Soft. She exhibits no distension. There is no tenderness. There is no rebound and no guarding.   Musculoskeletal:   Left knee with crepitus and she reports chronic swelling compared to the right. Abrasion right forearm.   Neurological: She is alert. No cranial nerve deficit.   Skin: Skin is warm and dry. She is not diaphoretic.   Psychiatric: She has a normal mood and affect. Her behavior is normal.   Nursing note and vitals reviewed.      Results Review:       I reviewed the patient's new clinical results.     I reviewed imaging, agree with interpretation.     I reviewed telemetry/EKG results, sinus      Results from last 7 days   Lab Units 02/10/20  0403 02/08/20  0840   WBC 10*3/mm3 4.29 7.03   HEMOGLOBIN g/dL 7.3* 9.2*   PLATELETS 10*3/mm3 231 351     Results from last 7 days   Lab Units 02/10/20  0403 02/08/20  0840   SODIUM mmol/L 138 141   POTASSIUM mmol/L 3.2* 4.0   CHLORIDE mmol/L 105 105   CO2 mmol/L 18.3* 20.1*   BUN mg/dL 7* 12   CREATININE mg/dL 0.81 1.13*   GLUCOSE mg/dL 123* 109*   Estimated Creatinine Clearance: 84.7 mL/min (by C-G formula based on SCr of 0.81  mg/dL).  Results from last 7 days   Lab Units 02/10/20  0403 20  0840   CALCIUM mg/dL 8.3* 8.7   ALBUMIN g/dL  --  4.50   MAGNESIUM mg/dL 1.6 2.7*         amLODIPine 10 mg Oral Daily   aspirin 81 mg Oral Daily   atorvastatin 40 mg Oral Nightly   carvedilol 12.5 mg Oral BID With Meals   DULoxetine 60 mg Oral Daily   famotidine 20 mg Oral BID AC   ferrous sulfate 325 mg Oral Daily With Breakfast   folic acid 1 mg Oral Daily   magnesium oxide 400 mg Oral Daily   melatonin 1 mg Oral Nightly   thiamine 100 mg Oral Daily       sodium chloride 100 mL/hr Last Rate: 100 mL/hr (20 1036)   Diet Regular; Cardiac    Assessment/Plan     Active Hospital Problems    Diagnosis  POA   • **Alcohol withdrawal syndrome without complication (CMS/HCC) [F10.230]  Yes   • Essential hypertension [I10]  Yes   • Osteoarthritis of left knee [M17.12]  Yes   • Fall [W19.XXXA]  Yes      Resolved Hospital Problems   No resolved problems to display.       · Alcohol Intoxication/Withdrawal: Continue vitamin supplementation. Minimal Ativan req overnight. Access evaluated.  · Right Elbow Fracture: Chronic fracture with new contusion. Appreciate orthopedic evaluation.  · OA Left Knee: Follow up with home orthopedist.  · Fall  · Anemia: No overt GI losses. Will repeat HH to monitor and check lab workup. Suspicion is from chronic alcohol abuse.  · Dispostion: TBD/1-2 days    Stanislav Gonzalez MD  [facility]  02/10/20  10:18 AM    Dictated portions using Dragon dictation software.    Electronically signed by Stanislav Gonzalez MD at 02/10/20 1024     Stingl, Reyna Carl MD at 20 1657          DAILY PROGRESS NOTE  [facility]    Patient Identification:  Name: Marylin Wilcox  Age: 59 y.o.  Sex: female  :  1960  MRN: [id number]         Primary Care Physician: Provider, No Known    Subjective: patient is feeling better today; splint is off her elbow; no visitors are present now  Interval  "History: follow up for hypertension, fall, alcohol intoxication, alcohol dependence     Objective:    Scheduled Meds:  amLODIPine 10 mg Oral Daily   aspirin 81 mg Oral Daily   atorvastatin 40 mg Oral Nightly   carvedilol 12.5 mg Oral BID With Meals   DULoxetine 60 mg Oral Daily   famotidine 20 mg Oral BID AC   ferrous sulfate 325 mg Oral Daily With Breakfast   folic acid 1 mg Oral Daily   magnesium oxide 400 mg Oral Daily   melatonin 1 mg Oral Nightly   thiamine 100 mg Oral Daily     Continuous Infusions:  sodium chloride 100 mL/hr Last Rate: 100 mL/hr (02/09/20 1036)       Vital signs in last 24 hours:  Temp:  [97.8 °F (36.6 °C)-98.1 °F (36.7 °C)] 97.9 °F (36.6 °C)  Heart Rate:  [87-97] 87  Resp:  [16-20] 18  BP: (143-165)/() 148/90    Intake/Output:    Intake/Output Summary (Last 24 hours) at 2/9/2020 1857  Last data filed at 2/9/2020 1855  Gross per 24 hour   Intake 1080 ml   Output 1450 ml   Net -370 ml       Exam:  /90   Pulse 87   Temp 97.9 °F (36.6 °C) (Oral)   Resp 18   Ht 170.2 cm (67\")   Wt 72.6 kg (160 lb)   SpO2 96%   BMI 25.06 kg/m²      General Appearance:    Alert, cooperative, no distress, AAOx3; appears chronically ill and older than stated age                          Head:    Normocephalic, without obvious abnormality, atraumatic                           Eyes:    PERRL, conjunctiva/corneas clear, EOM's intact, both eyes                         Throat:   Lips, tongue, gums normal; oral mucosa pink and moist                           Neck:   Supple, symmetrical, trachea midline, no JVD                         Lungs:    Decreased breath sounds bilaterally, respirations unlabored                 Chest Wall:    No tenderness or deformity                          Heart:    Regular rate and rhythm, S1 and S2 normal, no murmur,no  Rub                                      or gallop                  Abdomen:     Soft, non-tender, bowel sounds active, no masses, no                         "                                organomegaly                  Extremities:   Extremities normal, atraumatic, no cyanosis or edema                        Pulses:   Pulses palpable in all extremities                            Skin:   Skin is warm and dry,  no rashes or palpable lesions                  Neurologic:   CNII-XII intact, motor strength grossly intact, sensation grossly                                         intact to light touch, no focal deficits noted       Data Review:  Labs in chart were reviewed.  WBC   Date Value Ref Range Status   02/08/2020 7.03 3.40 - 10.80 10*3/mm3 Final     Hemoglobin   Date Value Ref Range Status   02/08/2020 9.2 (L) 12.0 - 15.9 g/dL Final     Hematocrit   Date Value Ref Range Status   02/08/2020 28.5 (L) 34.0 - 46.6 % Final     Platelets   Date Value Ref Range Status   02/08/2020 351 140 - 450 10*3/mm3 Final     Sodium   Date Value Ref Range Status   02/08/2020 141 136 - 145 mmol/L Final     Potassium   Date Value Ref Range Status   02/08/2020 4.0 3.5 - 5.2 mmol/L Final     Chloride   Date Value Ref Range Status   02/08/2020 105 98 - 107 mmol/L Final     CO2   Date Value Ref Range Status   02/08/2020 20.1 (L) 22.0 - 29.0 mmol/L Final     BUN   Date Value Ref Range Status   02/08/2020 12 8 - 23 mg/dL Final     Creatinine   Date Value Ref Range Status   02/08/2020 1.13 (H) 0.57 - 1.00 mg/dL Final     Glucose   Date Value Ref Range Status   02/08/2020 109 (H) 65 - 99 mg/dL Final     Calcium   Date Value Ref Range Status   02/08/2020 8.7 8.6 - 10.5 mg/dL Final     Magnesium   Date Value Ref Range Status   02/08/2020 2.7 (H) 1.6 - 2.4 mg/dL Final     AST (SGOT)   Date Value Ref Range Status   02/08/2020 23 1 - 32 U/L Final     ALT (SGPT)   Date Value Ref Range Status   02/08/2020 10 1 - 33 U/L Final     Alkaline Phosphatase   Date Value Ref Range Status   02/08/2020 97 39 - 117 U/L Final     Patient Active Problem List   Diagnosis Code   • Fall W19.XXXA       Assessment:     Fall  alcohol dependence  Alcohol intoxication  anemia  H/o cva  Plan:  Will continue to monitor  Recheck labs in the am  Elbow fracture is not new per ortho and splint is not needed  Recheck labs in am  Pt.ot to see  Unclear if she can return to her independent living  D.w patient and nurse  Strongly advised her to stop drinking alcohol  Access is following  Medium risk    Reyna Cao MD  2/9/2020  6:57 PM      Electronically signed by Reyna Cao MD at 02/09/20 1901     Willie Beal MD at 02/09/20 0905             Orthopedic Consult      Patient: Marylin Wilcox    Date of Admission: 2/8/2020  7:57 AM    YOB: 1960    Medical Record Number: [id number]    Consulting Physician: Soren Cornejo MD    Chief Complaints: Right elbow injury    History of Present Illness: 59 y.o. female admitted to [facility] to services of Soren Cornejo MD with a right elbow injury.  She was admitted yesterday with alcohol intoxication.  No family members are at the bedside today.  The patient is somnolent but arousable.  She is able to answer questions appropriately.  She tells me that she has had a number of falls in recent months.  She thinks that she may have fallen a couple times last week and injured the elbow during 1 of these falls.  She is not sure.  She admits that she may have injured the elbow in the past as well.  She describes moderate aching pain over the back of the elbow.  She is in a splint and this does help with the discomfort.  Denies any other complaints or injuries.  She had a stroke that affected her left side.  She says that she is left-hand dominant.  She reports good use and function of the right arm prior to this injury.  She does live in independent living facility.    Allergies:   Allergies   Allergen Reactions   • Valsartan Nausea And Vomiting       Home Medications:    Current Facility-Administered Medications:   •  acetaminophen (TYLENOL) tablet 650  mg, 650 mg, Oral, Q6H PRN, Gloria Mathis APRN  •  amLODIPine (NORVASC) tablet 10 mg, 10 mg, Oral, Daily, Gloria Mathis APRN, 10 mg at 02/09/20 0814  •  aspirin EC tablet 81 mg, 81 mg, Oral, Daily, Gloria Mathis APRN, 81 mg at 02/09/20 0814  •  atorvastatin (LIPITOR) tablet 40 mg, 40 mg, Oral, Nightly, Gloria Mathis APRN, 40 mg at 02/08/20 2249  •  carvedilol (COREG) tablet 12.5 mg, 12.5 mg, Oral, BID With Meals, Gloria Mathis APRN, 12.5 mg at 02/09/20 0814  •  DULoxetine (CYMBALTA) DR capsule 60 mg, 60 mg, Oral, Daily, Gloria Mathis APRN, 60 mg at 02/09/20 0814  •  famotidine (PEPCID) tablet 20 mg, 20 mg, Oral, BID AC, Gloria Mathis APRN, 20 mg at 02/09/20 0814  •  ferrous sulfate tablet 325 mg, 325 mg, Oral, Daily With Breakfast, Gloria Mathis APRN, 325 mg at 02/09/20 0814  •  folic acid (FOLVITE) tablet 1 mg, 1 mg, Oral, Daily, Gloria Mathis APRN, 1 mg at 02/09/20 0814  •  LORazepam (ATIVAN) tablet 1 mg, 1 mg, Oral, Q2H PRN **OR** LORazepam (ATIVAN) injection 1 mg, 1 mg, Intravenous, Q2H PRN **OR** LORazepam (ATIVAN) tablet 2 mg, 2 mg, Oral, Q1H PRN, 2 mg at 02/09/20 0612 **OR** LORazepam (ATIVAN) injection 2 mg, 2 mg, Intravenous, Q1H PRN **OR** LORazepam (ATIVAN) injection 2 mg, 2 mg, Intravenous, Q15 Min PRN **OR** LORazepam (ATIVAN) injection 2 mg, 2 mg, Intramuscular, Q15 Min PRN, Reyna Cao MD  •  magnesium oxide (MAG-OX) tablet 400 mg, 400 mg, Oral, Daily, Gloria Mathis APRN, 400 mg at 02/09/20 0814  •  melatonin tablet 1 mg, 1 mg, Oral, Nightly, Gloria Mathis APRN, 1 mg at 02/08/20 2250  •  nitroglycerin (NITROSTAT) SL tablet 0.4 mg, 0.4 mg, Sublingual, Q5 Min PRN, Gloria Mathis APRN  •  ondansetron (ZOFRAN) tablet 4 mg, 4 mg, Oral, Q6H PRN **OR** ondansetron (ZOFRAN) injection 4 mg, 4 mg, Intravenous, Q6H PRN, Reyna Cao MD  •  ondansetron (ZOFRAN) tablet 4 mg, 4 mg, Oral, Q6H PRN,  Gloria Mathis APRN  •  polyethylene glycol (MIRALAX) powder 17 g, 17 g, Oral, Daily PRN, Gloria Mathis APRN  •  [COMPLETED] Insert peripheral IV, , , Once **AND** sodium chloride 0.9 % flush 10 mL, 10 mL, Intravenous, PRN, Gustavo Alvarez MD  •  sodium chloride 0.9 % infusion, 100 mL/hr, Intravenous, Continuous, Stingl, Reyna Carl MD, Last Rate: 100 mL/hr at 20, 100 mL/hr at 20  •  thiamine (VITAMIN B-1) tablet 100 mg, 100 mg, Oral, Daily, Gloria Mathis APRN, 100 mg at 20    Current Medications:  Scheduled Meds:  amLODIPine 10 mg Oral Daily   aspirin 81 mg Oral Daily   atorvastatin 40 mg Oral Nightly   carvedilol 12.5 mg Oral BID With Meals   DULoxetine 60 mg Oral Daily   famotidine 20 mg Oral BID AC   ferrous sulfate 325 mg Oral Daily With Breakfast   folic acid 1 mg Oral Daily   magnesium oxide 400 mg Oral Daily   melatonin 1 mg Oral Nightly   thiamine 100 mg Oral Daily     Continuous Infusions:  sodium chloride 100 mL/hr Last Rate: 100 mL/hr (20)     PRN Meds:.•  acetaminophen  •  LORazepam **OR** LORazepam **OR** LORazepam **OR** LORazepam **OR** LORazepam **OR** LORazepam  •  nitroglycerin  •  ondansetron **OR** ondansetron  •  ondansetron  •  polyethylene glycol  •  [COMPLETED] Insert peripheral IV **AND** sodium chloride    Past Medical History:   Diagnosis Date   • Alcohol abuse    • Allergic rhinitis    • Anemia    • Crohn's colitis (CMS/HCC)    • Depression    • GERD (gastroesophageal reflux disease)    • GI bleed    • Hepatitis C    • Hernia, diaphragmatic    • Hypertension    • IBS (irritable bowel syndrome)    • Iron deficiency    • Stroke (CMS/HCC)    • Syncope    • Syncope        History reviewed. No pertinent surgical history.    Social History     Occupational History   • Not on file   Tobacco Use   • Smoking status: Former Smoker     Last attempt to quit: 2019     Years since quittin.3   • Smokeless tobacco: Never Used    Substance and Sexual Activity   • Alcohol use: Yes     Alcohol/week: 14.0 standard drinks     Types: 14 Shots of liquor per week     Comment: 2 DRINKS/DAY SINCE NOV. GRADUALLY INCREASING   • Drug use: Not Currently   • Sexual activity: Defer    Social History     Social History Narrative   • Not on file       History reviewed. No pertinent family history.    Review of Systems:     Constitutional:  Denies fever, shaking or chills   Eyes:  Denies change in visual acuity   HEENT:  Denies nasal congestion or sore throat   Respiratory:  Denies cough or shortness of breath   Cardiovascular:  Denies chest pain or edema  Endocrine: Denies tremors, palpitations, intolerance of heat or cold, polyuria, polydipsia.  GI:  Denies abdominal pain, nausea, vomiting, bloody stools or diarrhea  :  Denies frequency, urgency, incontinence, retention, or nocturia.  Musculoskeletal:  Denies numbness tingling or loss of motor function except as above  Integument:  Denies rash, lesion or ulceration   Neurologic:  Denies headache or focal weakness, deficits  Heme:  Denies epistaxis, spontaneous or excessive bleeding, hematuria, melena, fatigue, enlarged or tender lymph nodes.      All other pertinent positives and negatives as noted above in HPI.    Physical Exam: 59 y.o. female    Vitals:    02/08/20 1955 02/08/20 2248 02/08/20 2318 02/09/20 0737   BP: 148/85 148/85 143/91 (!) 165/101   BP Location: Left arm  Left arm Left arm   Patient Position: Lying  Lying Lying   Pulse: 97 95 90 93   Resp: 20  18 16   Temp:   98.1 °F (36.7 °C) 97.8 °F (36.6 °C)   TempSrc:   Oral Oral   SpO2: 95%  94%    Weight:       Height:         General:  Awake, alert. No acute distress.  Somnolent but arousable.  She is able to follow commands and answer questions.    Head/Neck:  Normocephalic, atraumatic.  Conjunctiva and sclera clear.  Hearing adequate for the exam.  Neck is supple with normal ROM.    Psych:  Affect and demeanor appropriate.  Otherwise, as  above    CV:  Regular rate and rhythm.  Hemodynamically stable.    Lungs:  Good chest expansion, breathing unlabored.    Abdomen:  Soft.  Non-tender, non-distended.    Extremities: Right elbow: She had a splint in place that I removed.  Skin appears benign.  She has ecchymosis over the back of the elbow and a shallow abrasion.  Compartments soft without evidence for DVT or compartment syndrome.  No atrophy.  No palpable masses or adenopathy.  She has only mild tenderness at the elbow.  Her extensor mechanism has a defect at the olecranon process and there are palpable bony fragments extending up along the back of the humerus.  This is completely nontender.  She is only tender in the area of ecchymosis just over the olecranon itself and into the forearm.  Again, this is mild.  She can nearly fully range the elbow without significant discomfort.  She can actively extend although it is weak relative to the other side.  Strength well-preserved distally with wrist flexion and extension,  and pinch.  Sensation to light touch grossly intact distally.  Good skin turgor, brisk cap refill and good pulses distally.    All other extremities atraumatic without gross abnormality.     Diagnostic Tests:    Admission on 02/08/2020   Component Date Value Ref Range Status   • Glucose 02/08/2020 109* 65 - 99 mg/dL Final   • BUN 02/08/2020 12  8 - 23 mg/dL Final   • Creatinine 02/08/2020 1.13* 0.57 - 1.00 mg/dL Final   • Sodium 02/08/2020 141  136 - 145 mmol/L Final   • Potassium 02/08/2020 4.0  3.5 - 5.2 mmol/L Final   • Chloride 02/08/2020 105  98 - 107 mmol/L Final   • CO2 02/08/2020 20.1* 22.0 - 29.0 mmol/L Final   • Calcium 02/08/2020 8.7  8.6 - 10.5 mg/dL Final   • Total Protein 02/08/2020 7.6  6.0 - 8.5 g/dL Final   • Albumin 02/08/2020 4.50  3.50 - 5.20 g/dL Final   • ALT (SGPT) 02/08/2020 10  1 - 33 U/L Final   • AST (SGOT) 02/08/2020 23  1 - 32 U/L Final   • Alkaline Phosphatase 02/08/2020 97  39 - 117 U/L Final   • Total  Bilirubin 02/08/2020 0.2  0.2 - 1.2 mg/dL Final   • eGFR Non African Amer 02/08/2020 49* >60 mL/min/1.73 Final   • Globulin 02/08/2020 3.1  gm/dL Final   • A/G Ratio 02/08/2020 1.5  g/dL Final   • BUN/Creatinine Ratio 02/08/2020 10.6  7.0 - 25.0 Final   • Anion Gap 02/08/2020 15.9* 5.0 - 15.0 mmol/L Final   • Protime 02/08/2020 15.2* 11.7 - 14.2 Seconds Final   • INR 02/08/2020 1.23* 0.90 - 1.10 Final   • Troponin T 02/08/2020 <0.010  0.000 - 0.030 ng/mL Final   • Color, UA 02/08/2020 Yellow  Yellow, Straw Final   • Appearance, UA 02/08/2020 Clear  Clear Final   • pH, UA 02/08/2020 5.5  5.0 - 8.0 Final   • Specific Gravity, UA 02/08/2020 1.010  1.005 - 1.030 Final   • Glucose, UA 02/08/2020 Negative  Negative Final   • Ketones, UA 02/08/2020 Negative  Negative Final   • Bilirubin, UA 02/08/2020 Negative  Negative Final   • Blood, UA 02/08/2020 Negative  Negative Final   • Protein, UA 02/08/2020 Negative  Negative Final   • Leuk Esterase, UA 02/08/2020 Negative  Negative Final   • Nitrite, UA 02/08/2020 Negative  Negative Final   • Urobilinogen, UA 02/08/2020 0.2 E.U./dL  0.2 - 1.0 E.U./dL Final   • Magnesium 02/08/2020 2.7* 1.6 - 2.4 mg/dL Final   • Acetaminophen 02/08/2020 <5.0* 10.0 - 30.0 mcg/mL Final   • Ethanol 02/08/2020 414* 0 - 10 mg/dL Final   • Ethanol % 02/08/2020 0.414  % Final   • Amphet/Methamphet, Screen 02/08/2020 Negative  Negative Final   • Barbiturates Screen, Urine 02/08/2020 Negative  Negative Final   • Benzodiazepine Screen, Urine 02/08/2020 Negative  Negative Final   • Cocaine Screen, Urine 02/08/2020 Negative  Negative Final   • Opiate Screen 02/08/2020 Negative  Negative Final   • THC, Screen, Urine 02/08/2020 Negative  Negative Final   • Methadone Screen, Urine 02/08/2020 Negative  Negative Final   • Oxycodone Screen, Urine 02/08/2020 Negative  Negative Final   • Salicylate 02/08/2020 0.3  <=30.0 mg/dL Final   • WBC 02/08/2020 7.03  3.40 - 10.80 10*3/mm3 Final   • RBC 02/08/2020 2.94* 3.77  - 5.28 10*6/mm3 Final   • Hemoglobin 02/08/2020 9.2* 12.0 - 15.9 g/dL Final   • Hematocrit 02/08/2020 28.5* 34.0 - 46.6 % Final   • MCV 02/08/2020 96.9  79.0 - 97.0 fL Final   • MCH 02/08/2020 31.3  26.6 - 33.0 pg Final   • MCHC 02/08/2020 32.3  31.5 - 35.7 g/dL Final   • RDW 02/08/2020 15.3  12.3 - 15.4 % Final   • RDW-SD 02/08/2020 53.1  37.0 - 54.0 fl Final   • MPV 02/08/2020 8.5  6.0 - 12.0 fL Final   • Platelets 02/08/2020 351  140 - 450 10*3/mm3 Final   • Neutrophil % 02/08/2020 57.8  42.7 - 76.0 % Final   • Lymphocyte % 02/08/2020 30.7  19.6 - 45.3 % Final   • Monocyte % 02/08/2020 7.4  5.0 - 12.0 % Final   • Eosinophil % 02/08/2020 2.1  0.3 - 6.2 % Final   • Basophil % 02/08/2020 1.7* 0.0 - 1.5 % Final   • Immature Grans % 02/08/2020 0.3  0.0 - 0.5 % Final   • Neutrophils, Absolute 02/08/2020 4.06  1.70 - 7.00 10*3/mm3 Final   • Lymphocytes, Absolute 02/08/2020 2.16  0.70 - 3.10 10*3/mm3 Final   • Monocytes, Absolute 02/08/2020 0.52  0.10 - 0.90 10*3/mm3 Final   • Eosinophils, Absolute 02/08/2020 0.15  0.00 - 0.40 10*3/mm3 Final   • Basophils, Absolute 02/08/2020 0.12  0.00 - 0.20 10*3/mm3 Final   • Immature Grans, Absolute 02/08/2020 0.02  0.00 - 0.05 10*3/mm3 Final   • nRBC 02/08/2020 0.0  0.0 - 0.2 /100 WBC Final   • Creatine Kinase 02/08/2020 238* 20 - 180 U/L Final   • Glucose 02/08/2020 111  70 - 130 mg/dL Final     Lab Results (last 24 hours)     Procedure Component Value Units Date/Time    Urine Drug Screen - Urine, Clean Catch [[id number]]  (Normal) Collected:  02/08/20 0942    Specimen:  Urine, Clean Catch Updated:  02/08/20 1021     Amphet/Methamphet, Screen Negative     Barbiturates Screen, Urine Negative     Benzodiazepine Screen, Urine Negative     Cocaine Screen, Urine Negative     Opiate Screen Negative     THC, Screen, Urine Negative     Methadone Screen, Urine Negative     Oxycodone Screen, Urine Negative    Narrative:       Negative Thresholds For Drugs Screened:     Amphetamines                500 ng/ml   Barbiturates               200 ng/ml   Benzodiazepines            100 ng/ml   Cocaine                    300 ng/ml   Methadone                  300 ng/ml   Opiates                    300 ng/ml   Oxycodone                  100 ng/ml   THC                        50 ng/ml    The Normal Value for all drugs tested is negative. This report includes final unconfirmed screening results to be used for medical treatment purposes only. Unconfirmed results must not be used for non-medical purposes such as employment or legal testing. Clinical consideration should be applied to any drug of abuse test, particulary when unconfirmed results are used.    Urinalysis With Culture If Indicated - Urine, Catheter [[id number]]  (Normal) Collected:  02/08/20 0942    Specimen:  Urine, Catheter Updated:  02/08/20 1001     Color, UA Yellow     Appearance, UA Clear     pH, UA 5.5     Specific Gravity, UA 1.010     Glucose, UA Negative     Ketones, UA Negative     Bilirubin, UA Negative     Blood, UA Negative     Protein, UA Negative     Leuk Esterase, UA Negative     Nitrite, UA Negative     Urobilinogen, UA 0.2 E.U./dL    Narrative:       Urine microscopic not indicated.    Protime-INR [[id number]]  (Abnormal) Collected:  02/08/20 0840    Specimen:  Blood Updated:  02/08/20 0912     Protime 15.2 Seconds      INR 1.23    Comprehensive Metabolic Panel [[id number]]  (Abnormal) Collected:  02/08/20 0840    Specimen:  Blood Updated:  02/08/20 0910     Glucose 109 mg/dL      BUN 12 mg/dL      Creatinine 1.13 mg/dL      Sodium 141 mmol/L      Potassium 4.0 mmol/L      Chloride 105 mmol/L      CO2 20.1 mmol/L      Calcium 8.7 mg/dL      Total Protein 7.6 g/dL      Albumin 4.50 g/dL      ALT (SGPT) 10 U/L      AST (SGOT) 23 U/L      Alkaline Phosphatase 97 U/L      Total Bilirubin 0.2 mg/dL      eGFR Non African Amer 49 mL/min/1.73      Globulin 3.1 gm/dL      A/G Ratio 1.5 g/dL      BUN/Creatinine Ratio 10.6     Anion Gap 15.9 mmol/L      Narrative:       GFR Normal >60  Chronic Kidney Disease <60  Kidney Failure <15      Troponin [[id number]]  (Normal) Collected:  02/08/20 0840    Specimen:  Blood Updated:  02/08/20 0910     Troponin T <0.010 ng/mL     Narrative:       Troponin T Reference Range:  <= 0.03 ng/mL-   Negative for AMI  >0.03 ng/mL-     Abnormal for myocardial necrosis.  Clinicians would have to utilize clinical acumen, EKG, Troponin and serial changes to determine if it is an Acute Myocardial Infarction or myocardial injury due to an underlying chronic condition.       Results may be falsely decreased if patient taking Biotin.      Acetaminophen Level [[id number]]  (Abnormal) Collected:  02/08/20 0840    Specimen:  Blood Updated:  02/08/20 0910     Acetaminophen <5.0 mcg/mL     Ethanol [[id number]]  (Abnormal) Collected:  02/08/20 0840    Specimen:  Blood Updated:  02/08/20 0910     Ethanol 414 mg/dL      Ethanol % 0.414 %     Salicylate Level [[id number]]  (Normal) Collected:  02/08/20 0840    Specimen:  Blood Updated:  02/08/20 0910     Salicylate 0.3 mg/dL     Narrative:       Therapeutic range for Salicylates:  3.0 - 10.0 mg/dL for antipyretic/analgesic conditions  15.0 - 30.0 mg/dL for anti-inflammatory conditions    CK [[id number]]  (Abnormal) Collected:  02/08/20 0840    Specimen:  Blood Updated:  02/08/20 0910     Creatine Kinase 238 U/L     Magnesium [[id number]]  (Abnormal) Collected:  02/08/20 0840    Specimen:  Blood Updated:  02/08/20 0910     Magnesium 2.7 mg/dL         Imaging: AP and lateral views of the right elbow are reviewed on the [facility] system along with the associated report.  CT scan of the elbow is also reviewed along with the report.  The x-rays show a comminuted, displaced olecranon fracture.  There is rounding of the end of the olecranon and sclerosis around the margins of the displaced olecranon fragment consistent with a chronic fracture/nonunion.  The CT scan confirms the same.  She has a small effusion or  "hemarthrosis but no other acute abnormalities.    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  There is no telling how long this has been there.  Her pain is fairly minimal and there is no need for immobilization.  I recommend relative rest, icing and anti-inflammatories for her discomfort.  The pain should slowly get better.  She can follow-up as an outpatient.  Thank you for the consultation.  Please call with any questions or concerns.    Date: 2/9/2020    Willie Beal MD    CC: Soren Cornejo MD        Electronically signed by Willie Beal MD at 02/09/20 0914          Consult Notes       Ángel Mirza LCSW at 02/09/20 1111      Consult Orders    1. [facility] Consult [[id number]] ordered by Reyna Cao MD at 02/08/20 1719              [facility] evaluated pt. Pt alert and able to answer questions. Pt states she has had several falls but did not explain them. Pt vague in her answers. Pt states she drinks \"two drinks a day\". When asked how she had a BAL of 414, pt states they were \"celebrating a birthday\" but was not clear on the details. Pt states she has had past tx for alcohol abuse. Pt states she was in an in[place] Pt states she then spent two yrs in outpt tx at the [facility]. This was a few yrs back according to pt. Pt not able to remember dates at this time. Pt states she went with one of her sisters who was struggling with prescription drug issues. Pt states they went to  together for a year until last Sept 2019. Pt states she had a sponsor at that time but no longer. Pt denies any seizures or hallucinations with past detox.     Pt denies any SI past or present. Pt rates her anxiety as a \"9\" and her depressed mood as a \"0\". Pt states she has taken Cymbalta 60mg for a while. Pt states she moved to the assisted living place two months " "ago. Pt states she previously lived with her sister. Pt states she has 6 sisters who are supportive. Pt states her sleep has been poor lately but appetite is good as she eats three meals a day at the facility. Pt has had strokes in the past. Pt states she had to quit her job as  due to a fall and shoulder surgery a year ago. Pt states she is in the process of applying for disability.     Pt is  two times with the last  having tried to \"slit my throat\". Pt has a 28 yr old son who lives in [place]. Pt states they keep in touch and he is her POA. Access talked with pt about follow up care. Access asked pt about returning to [place] and outpt mtgs at the [facility] as she felt it was helpful in the past. Pt noncommittal and states she doesn't drive. Access gave pt information sheet on transportation services thru Medicaid.     Access will follow.    Electronically signed by Ángel Mirza LCSW at 02/09/20 1154           for Marylin Wilcox as of 2/8/20 through 2/10/20     1 Day 3 Days 7 Days 10 Days < Today >    Legend:                          Discontinued    Completed    Future    MAR Hold     Other Encounter    Linked           Medications 02/08/20 02/09/20 02/10/20   acetaminophen (TYLENOL) tablet 1,000 mg   Dose: 1,000 mg  Freq: Once Route: PO  Start: 02/08/20 0926 End: 02/08/20 0942    Admin Instructions:   Do not exceed 4 grams of acetaminophen in a 24 hr period.    If given for pain, use the following pain scale:   Mild Pain = Pain Score of 1-3, CPOT 1-2  Moderate Pain = Pain Score of 4-6, CPOT 3-4  Severe Pain = Pain Score of 7-10, CPOT 5-8    0942              amLODIPine (NORVASC) tablet 10 mg   Dose: 10 mg  Freq: Daily Route: PO  Start: 02/09/20 0900    Admin Instructions:   Caution: Look alike/sound alike drug alert. Avoid grapefruit juice.     0814 0905            aspirin EC tablet 81 mg   Dose: 81 mg  Freq: Daily Route: PO  Start: 02/09/20 0900    " Admin Instructions:   Herbal/drug interaction: Avoid use with ginkgo biloba. Do not crush or chew.  Do not exceed 4 grams of aspirin in a 24 hr period.    If given for pain, use the following pain scale:   Mild Pain = Pain Score of 1-3, CPOT 1-2  Moderate Pain = Pain Score of 4-6, CPOT 3-4  Severe Pain = Pain Score of 7-10, CPOT 5-8     0814 0906            atorvastatin (LIPITOR) tablet 40 mg   Dose: 40 mg  Freq: Nightly Route: PO  Start: 02/08/20 2245    Admin Instructions:   Avoid grapefruit juice.    2249 2036          2100            carvedilol (COREG) tablet 12.5 mg   Dose: 12.5 mg  Freq: 2 Times Daily With Meals Route: PO  Start: 02/08/20 2245    Admin Instructions:   Give with food.    2248 0814   1801        0905   1800          DULoxetine (CYMBALTA) DR capsule 60 mg   Dose: 60 mg  Freq: Daily Route: PO  Start: 02/09/20 0900    Admin Instructions:   Caution: Look alike/sound alike drug alert Do not crush or chew capsule.     0814 0904            famotidine (PEPCID) tablet 20 mg   Dose: 20 mg  Freq: 2 Times Daily Before Meals Route: PO  Start: 02/09/20 0730     0814   1802        0906   1730          ferrous sulfate tablet 325 mg   Dose: 325 mg  Freq: Daily With Breakfast Route: PO  Start: 02/09/20 0800    Admin Instructions:   Swallow whole. Do not crush, split, or chew. Take with food if GI upset occurs.     0814 0904            folic acid (FOLVITE) tablet 1 mg   Dose: 1 mg  Freq: Daily Route: PO  Start: 02/09/20 0900 0814 0904            magnesium oxide (MAG-OX) tablet 400 mg   Dose: 400 mg  Freq: Daily Route: PO  Start: 02/09/20 0900    Admin Instructions:   Each 400mg of magnesium oxide is equal to 241.3mg of elemental magnesium.     0814 0904            melatonin tablet 1 mg   Dose: 1 mg  Freq: Nightly Route: PO  Start: 02/08/20 2245    2250 2036          2100            multiple vitamin (M.V.I. Adult) 10 mL, thiamine (B-1) 100  mg, folic acid 1 mg, magnesium sulfate 2 g in sodium chloride 0.9 % 1,000 mL infusion   Dose: 1,000 mL/hr  Freq: Once Route: IV  Last Dose: Stopped (02/08/20 1041)  Start: 02/08/20 0803 End: 02/08/20 1041    0839   1041            thiamine (VITAMIN B-1) tablet 100 mg   Dose: 100 mg  Freq: Daily Route: PO  Start: 02/09/20 0900     0814          0904            Medications 02/08/20 02/09/20 02/10/20       Continuous Meds Sorted by Name   for Marylin Wilcox as of 2/8/20 through 2/10/20    Legend:                          Discontinued    Completed    Future    MAR Hold     Other Encounter    Linked           Medications 02/08/20 02/09/20 02/10/20   sodium chloride 0.9 % infusion   Rate: 100 mL/hr Dose: 100 mL/hr  Freq: Continuous Route: IV  Last Dose: 100 mL/hr (02/10/20 1447)  Start: 02/08/20 1815    (1953) [C]          0030   0100   0328     0533   1036        1447                PRN Meds Sorted by Name   for Marylin Wilcox as of 2/8/20 through 2/10/20    Legend:                          Discontinued    Completed    Future    MAR Hold     Other Encounter    Linked           Medications 02/08/20 02/09/20 02/10/20   acetaminophen (TYLENOL) tablet 650 mg   Dose: 650 mg  Freq: Every 6 Hours PRN Route: PO  PRN Reason: Mild Pain   Start: 02/08/20 2148    Admin Instructions:   Do not exceed 4 grams of acetaminophen in a 24 hr period.    If given for pain, use the following pain scale:   Mild Pain = Pain Score of 1-3, CPOT 1-2  Moderate Pain = Pain Score of 4-6, CPOT 3-4  Severe Pain = Pain Score of 7-10, CPOT 5-8      0904            LORazepam (ATIVAN) tablet 1 mg   Dose: 1 mg  Freq: Every 2 Hours PRN Route: PO  PRN Reason: Withdrawal  PRN Comment: For Ernesto 8-10  Start: 02/08/20 1717 End: 02/18/20 1716    Admin Instructions:   Reassess 2 Hours After Administration   Caution: Look alike/sound alike drug alert                       Or  LORazepam (ATIVAN) injection 1 mg   Dose: 1 mg  Freq: Every 2 Hours PRN Route: IV  PRN  Reason: Withdrawal  PRN Comment: For CIWA-Ar 8-10  Start: 02/08/20 1717 End: 02/18/20 1716    Admin Instructions:   Reassess 2 Hours After Administration   Caution: Look alike/sound alike drug alert                       Or  LORazepam (ATIVAN) tablet 2 mg   Dose: 2 mg  Freq: Every 1 Hour PRN Route: PO  PRN Reason: Withdrawal  PRN Comment: For CIWA-Ar 11-15  Start: 02/08/20 1717 End: 02/18/20 1716    Admin Instructions:   Reassess 1 Hour After Administration   Caution: Look alike/sound alike drug alert    2249          0612             Or  LORazepam (ATIVAN) injection 2 mg   Dose: 2 mg  Freq: Every 1 Hour PRN Route: IV  PRN Reason: Withdrawal  PRN Comment: For CIWA-Ar 11-15  Start: 02/08/20 1717 End: 02/18/20 1716    Admin Instructions:   Reassess 1 Hour After Administration   Caution: Look alike/sound alike drug alert                       Or  LORazepam (ATIVAN) injection 2 mg   Dose: 2 mg  Freq: Every 15 Minutes PRN Route: IV  PRN Reason: Anxiety  PRN Comment: For CIWA-Ar Greater Than 15.  Repeat Dose in 15 Minutes if CIWA-Ar Does Not Decrease  Start: 02/08/20 1717 End: 02/18/20 1716    Admin Instructions:   Reassess 15 Minutes After Each Administration.  If CIWA-Ar Does Not Decrease Contact Provider To Discuss Transfer to Higher Level of Care.   Caution: Look alike/sound alike drug alert                       Or  LORazepam (ATIVAN) injection 2 mg   Dose: 2 mg  Freq: Every 15 Minutes PRN Route: IM  PRN Reason: Withdrawal  PRN Comment: If Unable to Administer IV - For CIWA-Ar Greater Than 15.  Repeat Dose in 15 Minutes if CIWA-Ar Does Not Decrease  Start: 02/08/20 1717 End: 02/18/20 1716    Admin Instructions:   Reassess 15 Minutes After Each Administration.  If CIWA-Ar Does Not Decrease Contact Provider To Discuss Transfer to Higher Level of Care.  aution: Look alike/sound alike drug alert                       Magnesium Sulfate 2 gram Bolus, followed by 8 gram infusion (total Mg dose 10 grams)- Mg less than or  equal to 1mg/dL   Dose: 2 g  Freq: As Needed Route: IV  PRN Comment: See Administration Instructions  Start: 02/10/20 0858    Admin Instructions:   Mg less than or equal to 1mg/dL. Give 2 gm over 30 minutes as bolus, then infuse 2 gm over 2 hours for 4 doses (8 grams) for total dose of 10 grams.  Recheck Mg levels in the AM.         Or  Magnesium Sulfate 2 gram / 50mL Infusion (GIVE X 3 BAGS TO EQUAL 6GM TOTAL DOSE) - Mg 1.1 - 1.5 mg/dl   Dose: 2 g  Freq: As Needed Route: IV  PRN Comment: See Administration Instructions  Start: 02/10/20 0858    Admin Instructions:   Mg 1.1 -1.5 mg/dL. Infuse 2 grams over 2 hours for 3 doses (for a total Mg dose of 6 grams).  Recheck Mg level in the AM.         Or  Magnesium Sulfate 4 gram infusion- Mg 1.6-1.9 mg/dL   Dose: 4 g  Freq: As Needed Route: IV  PRN Comment: See Administration Instructions  Start: 02/10/20 0858    Admin Instructions:   Mg 1.6-1.9 mg/dL. Recheck Mg level in the AM.         nitroglycerin (NITROSTAT) SL tablet 0.4 mg   Dose: 0.4 mg  Freq: Every 5 Minutes PRN Route: SL  PRN Reason: Chest Pain  Start: 02/08/20 2148    Admin Instructions:   May administer up to 3 doses per episode.         ondansetron (ZOFRAN) tablet 4 mg   Dose: 4 mg  Freq: Every 6 Hours PRN Route: PO  PRN Reasons: Nausea,Vomiting  Start: 02/08/20 1717         Or  ondansetron (ZOFRAN) injection 4 mg   Dose: 4 mg  Freq: Every 6 Hours PRN Route: IV  PRN Reasons: Nausea,Vomiting  Start: 02/08/20 1717         ondansetron (ZOFRAN) tablet 4 mg   Dose: 4 mg  Freq: Every 6 Hours PRN Route: PO  PRN Reasons: Nausea,Vomiting  Start: 02/08/20 2148         polyethylene glycol (MIRALAX) powder 17 g   Dose: 17 g  Freq: Daily PRN Route: PO  PRN Comment: constipation  Start: 02/08/20 2148         potassium chloride (MICRO-K) CR capsule 40 mEq   Dose: 40 mEq  Freq: As Needed Route: PO  PRN Comment: Potassium Replacement.  See Admin Instructions  Start: 02/10/20 0858    Admin Instructions:   Potassium 3.1 or Less  Give KCl 40 mEq q4h x3 Doses   Potassium 3.2 - 3.6 Give KCl 40 mEq q4h x2 Doses     Check Potassium 4 Hours After Last Dose Given   Check Magnesium if Potassium Level Remains Low After Replacement   DO NOT GIVE if CrCl is Less Than 30 mL/minute or Urine Output Less Than 30 mL/hr      1442            Or  potassium chloride (KLOR-CON) packet 40 mEq   Dose: 40 mEq  Freq: As Needed Route: PO  PRN Comment: potassium replacement, see admin instructions  Start: 02/10/20 0858    Admin Instructions:   Potassium 3.1 or Less Give KCl 40 mEq q4h x3 Doses   Potassium 3.2 - 3.6 Give KCl 40 mEq q4h x2 Doses     Check Potassium 4 Hours After Last Dose Given   Check Magnesium if Potassium Level Remains Low After Replacement   DO NOT GIVE if CrCl is Less Than 30 mL/minute or Urine Output Less Than 30 mL/hr                Or  potassium chloride 10 mEq in 100 mL IVPB   Dose: 10 mEq  Freq: Every 1 Hour PRN Route: IV  PRN Comment: Potassium Replacement - See Admin Instructions  Start: 02/10/20 0858    Admin Instructions:   Peripheral or Central IV  Potassium 3.1 or Less Give KCl 10 mEq/100 mL NS IV q1h x6 Doses  Potassium 3.2 - 3.6 Give KCl 10 mEq/100 mL NS q1h x4 Doses    Check Potassium 4 Hours After Last Dose Given  Check Magnesium if Potassium Remains Low After Replacement  DO NOT GIVE if CrCl is Less Than 30 mL/minute or Urine Output Less Than 30 mL/hr.     Rates Greater Than 10 mEq/hr Require ECG Monitoring.  OUTPATIENT/NON-MONITORED UNITS: Potassium Chloride standard bolus infusion rate is a maximum of 10 mEq/hr on unmonitored patients    MONITORED UNITS: Potassium Chloride standard bolus infusion rate is a maximum of 20 mEq/hr on ECG monitored patients ONLY                sodium chloride 0.9 % flush 10 mL   Dose: 10 mL  Freq: As Needed Route: IV  PRN Reason: Line Care  Start: 02/08/20 0800

## 2020-02-10 NOTE — PLAN OF CARE
Problem: Patient Care Overview  Goal: Plan of Care Review  Flowsheets (Taken 2/10/2020 0841)  Outcome Summary: Pt admitted 2/8 after fall at ind. living facility.  Pt reports walking to dining mock,  no AD, frequent falls, attributes these to L knee pain/buckling, per chart was found down w/ bottle of vodka.  H/o CVA.  Today pt demonstrates generalized weakness, impaired standing balance and gait pattern but able to ambulate 60' w/ min A using RW; declined further distance 2/2 knee pain and fatigue.  May benefit from DC to SNU.

## 2020-02-10 NOTE — THERAPY EVALUATION
Acute Care - Occupational Therapy Initial Evaluation  [facility]     Patient Name: Marylin Wilcox  : 1960  MRN: [id number]  Today's Date: 2/10/2020             Admit Date: 2020       ICD-10-CM ICD-9-CM   1. Fall, initial encounter W19.XXXA E888.9   2. Acute alcoholic intoxication in alcoholism with complication (CMS/HCC) F10.229 303.00   3. Elbow fracture, right, closed, initial encounter S42.401A 812.40   4. Left hemiparesis (CMS/HCC) G81.94 342.90     Patient Active Problem List   Diagnosis   • Fall   • Alcohol withdrawal syndrome without complication (CMS/HCC)   • Essential hypertension   • Osteoarthritis of left knee   • Alcohol withdrawal (CMS/HCC)     Past Medical History:   Diagnosis Date   • Alcohol abuse    • Allergic rhinitis    • Anemia    • Crohn's colitis (CMS/HCC)    • Depression    • GERD (gastroesophageal reflux disease)    • GI bleed    • Hepatitis C    • Hernia, diaphragmatic    • Hypertension    • IBS (irritable bowel syndrome)    • Iron deficiency    • Stroke (CMS/HCC)    • Syncope    • Syncope      History reviewed. No pertinent surgical history.       OT ASSESSMENT FLOWSHEET (last 12 hours)      Occupational Therapy Evaluation     Row Name 02/10/20 1157                   OT Evaluation Time/Intention    Subjective Information  no complaints  -RD        Document Type  evaluation  -RD        Mode of Treatment  occupational therapy  -RD        Patient Effort  good  -RD        Symptoms Noted During/After Treatment  none  -RD           General Information    Patient Profile Reviewed?  yes  -RD        Patient Observations  alert;cooperative;agree to therapy  -RD        General Observations of Patient  pt seated @ EOB w/ no signs of acute distress  -RD        Prior Level of Function  independent:;ADL's per pt report  -RD        Equipment Currently Used at Home  shower chair;grab bar  -RD        Pertinent History of Current Functional Problem  pt admitted d/t fall w/ alcohol withdrawal  syndrome w/out complication  -RD        Existing Precautions/Restrictions  fall  -RD        Risks Reviewed  patient:  -RD        Benefits Reviewed  patient:  -RD           Relationship/Environment    Lives With  facility resident  -RD           Resource/Environmental Concerns    Current Living Arrangements  home/apartment/condo  -RD           Cognitive Assessment/Intervention- PT/OT    Orientation Status (Cognition)  oriented x 3  -RD        Follows Commands (Cognition)  follows one step commands;verbal cues/prompting required  -RD        Safety Deficit (Cognitive)  mild deficit;impulsivity;insight into deficits/self awareness;awareness of need for assistance  -RD           Bed Mobility Assessment/Treatment    Bed Mobility Assessment/Treatment  sit-supine  -RD        Sit-Supine [place] (Bed Mobility)  supervision  -RD        Assistive Device (Bed Mobility)  bed rails  -RD           Transfer Assessment/Treatment    Transfer Assessment/Treatment  sit-stand transfer;stand-sit transfer;toilet transfer  -RD           Sit-Stand Transfer    Sit-Stand [place] (Transfers)  contact guard;verbal cues  -RD           Stand-Sit Transfer    Stand-Sit [place] (Transfers)  contact guard;verbal cues  -RD           Toilet Transfer    Type (Toilet Transfer)  stand pivot/stand step  -RD        [place] Level (Toilet Transfer)  contact guard;verbal cues  -RD        Assistive Device (Toilet Transfer)  commode, bedside without drop arms  -RD           ADL Assessment/Intervention    BADL Assessment/Intervention  lower body dressing;toileting  -RD           Lower Body Dressing Assessment/Training    Lower Body Dressing [place] Level  lower body dressing skills;doff;don;socks;set up;supervision  -RD        Lower Body Dressing Position  edge of bed sitting  -RD           Toileting Assessment/Training    [place] Level (Toileting)  toileting skills;adjust/manage clothing;perform perineal hygiene;contact guard  assist;verbal cues  -RD        Assistive Devices (Toileting)  commode, bedside without drop arms  -RD        Toileting Position  supported sitting;supported standing  -RD           General ROM    GENERAL ROM COMMENTS  B UE AROM WFL  -RD           MMT (Manual Muscle Testing)    General MMT Comments  B UE MMT: grossly approx. 3+/5  -RD           Motor Assessment/Interventions    Additional Documentation  Balance (Group);Therapeutic Exercise Interventions (Group)  -RD           Therapeutic Exercise    Upper Extremity Range of Motion (Therapeutic Exercise)  shoulder internal/external rotation, bilateral;elbow flexion/extension, right  -RD        Exercise Type (Therapeutic Exercise)  AROM (active range of motion)  -RD        Position (Therapeutic Exercise)  seated  -RD        Sets/Reps (Therapeutic Exercise)  10 reps  -RD        Expected Outcome (Therapeutic Exercise)  improve functional tolerance, self-care activity;improve performance, BADLs;improve performance, transfer skills  -RD           Balance    Balance  static sitting balance;static standing balance  -RD           Static Sitting Balance    Level of [place] (Unsupported Sitting, Static Balance)  independent  -RD        Sitting Position (Unsupported Sitting, Static Balance)  sitting on edge of bed  -RD        Time Able to Maintain Position (Unsupported Sitting, Static Balance)  more than 5 minutes  -RD           Static Standing Balance    Level of [place] (Supported Standing, Static Balance)  contact guard assist  -RD        Time Able to Maintain Position (Supported Standing, Static Balance)  45 to 60 seconds  -RD           Positioning and Restraints    Pre-Treatment Position  in bed  -RD        Post Treatment Position  bed  -RD        In Bed  fowlers;call light within reach;encouraged to call for assist;exit alarm on  -RD           Pain Assessment    Additional Documentation  Pain Scale: Word Pre/Post-Treatment (Group)  -RD           Pain Scale: Word  Pre/Post-Treatment    Pain: Word Scale, Pretreatment  2 - mild pain  -RD        Pain: Word Scale, Post-Treatment  2 - mild pain  -RD        Pre/Post Treatment Pain Comment  L knee; respositioned, increased activity; tolerated eval/tx  -RD           Coping    Observed Emotional State  accepting;calm;cooperative  -RD        Verbalized Emotional State  acceptance  -RD           Plan of Care Review    Plan of Care Reviewed With  patient  -RD           Clinical Impression (OT)    OT Diagnosis  Pt presents to OT eval w/ impaired balance, functional endurance, and decreased indep/safety w/ ADLs  -RD        Criteria for Skilled Therapeutic Interventions Met (OT Eval)  yes;treatment indicated  -RD        Rehab Potential (OT Eval)  good, to achieve stated therapy goals  -RD        Therapy Frequency (OT Eval)  5 times/wk  -RD        Care Plan Review (OT)  evaluation/treatment results reviewed;care plan/treatment goals reviewed;patient/other agree to care plan  -RD        Anticipated Discharge Disposition (OT)  assisted living facility (detention);home with assist;skilled nursing facility pending pt progress while inpatient  -RD           Planned OT Interventions    Planned Therapy Interventions (OT Eval)  activity tolerance training;BADL retraining;functional balance retraining;transfer/mobility retraining;strengthening exercise;ROM/therapeutic exercise;patient/caregiver education/training  -RD           OT Goals    Transfer Goal Selection (OT)  transfer, OT goal 1  -RD        Toileting Goal Selection (OT)  toileting, OT goal 1  -RD        Balance Goal Selection (OT)  balance, OT goal 1  -RD        Additional Documentation  Balance Goal Selection (OT) (Row)  -RD           Transfer Goal 1 (OT)    Activity/Assistive Device (Transfer Goal 1, OT)  toilet  -RD        [place] Level/Cues Needed (Transfer Goal 1, OT)  supervision required  -RD        Time Frame (Transfer Goal 1, OT)  long term goal (LTG)  -RD        Progress/Outcome  (Transfer Goal 1, OT)  goal ongoing  -RD           Toileting Goal 1 (OT)    Activity/Device (Toileting Goal 1, OT)  toileting skills, all  -RD        [place] Level/Cues Needed (Toileting Goal 1, OT)  supervision required  -RD        Time Frame (Toileting Goal 1, OT)  long term goal (LTG)  -RD        Progress/Outcome (Toileting Goal 1, OT)  goal ongoing  -RD           Balance Goal 1 (OT)    Activity/Assistive Device (Balance Goal 1, OT)  standing, static to assist w/ safety during ADLs/transfers  -RD        [place] Level/Cues Needed (Balance Goal 1, OT)  supervision required  -RD        Time Frame (Balance Goal 1, OT)  long term goal (LTG)  -RD        Progress/Outcomes (Balance Goal 1, OT)  goal ongoing  -RD          User Key  (r) = Recorded By, (t) = Taken By, (c) = Cosigned By    Initials Name Effective Dates    Wanda Garcia, OT 10/14/19 -          Occupational Therapy Education                 Title: PT OT SLP Therapies (In Progress)     Topic: Occupational Therapy (In Progress)     Point: ADL training (Done)     Description:   Instruct learner(s) on proper safety adaptation and remediation techniques during self care or transfers.   Instruct in proper use of assistive devices.              Learning Progress Summary           Patient Acceptance, E,D, VU,NR by MARGARET at 2/10/2020 1444    Comment:  OT educ on OT role in therapeutic process and pt's POC. OT also educ on B UE thera ex for improved UE strength and endurance.                   Point: Home exercise program (Done)     Description:   Instruct learner(s) on appropriate technique for monitoring, assisting and/or progressing therapeutic exercises/activities.              Learning Progress Summary           Patient Acceptance, E,D, VU,NR by MARGARET at 2/10/2020 1444    Comment:  OT educ on OT role in therapeutic process and pt's POC. OT also educ on B UE thera ex for improved UE strength and endurance.                               User Key      Initials Effective Dates Name Provider Type Discipline    RD 10/14/19 -  Wanda Desai OT Occupational Therapist OT                  OT Recommendation and Plan  Outcome Summary/Treatment Plan (OT)  Anticipated Discharge Disposition (OT): assisted living facility (shelter), home with assist, skilled nursing facility(pending pt progress while inpatient)  Planned Therapy Interventions (OT Eval): activity tolerance training, BADL retraining, functional balance retraining, transfer/mobility retraining, strengthening exercise, ROM/therapeutic exercise, patient/caregiver education/training  Therapy Frequency (OT Eval): 5 times/wk  Plan of Care Review  Plan of Care Reviewed With: patient  Plan of Care Reviewed With: patient    Outcome Measures     Row Name 02/10/20 1400             How much help from another is currently needed...    Putting on and taking off regular lower body clothing?  3  -RD      Bathing (including washing, rinsing, and drying)  3  -RD      Toileting (which includes using toilet bed pan or urinal)  3  -RD      Putting on and taking off regular upper body clothing  3  -RD      Taking care of personal grooming (such as brushing teeth)  3  -RD      Eating meals  4  -RD      AM-PAC 6 Clicks Score (OT)  19  -RD         Functional Assessment    Outcome Measure Options  AM-PAC 6 Clicks Daily Activity (OT)  -RD        User Key  (r) = Recorded By, (t) = Taken By, (c) = Cosigned By    Initials Name Provider Type    RD Wanda Desai, OT Occupational Therapist          Time Calculation:   Time Calculation- OT     Row Name 02/10/20 1449             Time Calculation- OT    OT Start Time  1143  -RD      OT Stop Time  1157  -RD      OT Time Calculation (min)  14 min  -RD      Total Timed Code Minutes- OT  9 minute(s)  -RD      OT Received On  02/10/20  -RD      OT Goal Re-Cert Due Date  02/24/20  -RD        User Key  (r) = Recorded By, (t) = Taken By, (c) = Cosigned By    Initials Name Provider Type    MARGARET  Wanda Desai, OT Occupational Therapist        Therapy Charges for Today     Code Description Service Date Service Provider Modifiers Qty    92142933497 HC OT EVAL MOD COMPLEXITY 2 2/10/2020 Wanda Desai, OT GO 1    [id number] HC OT SELF CARE/MGMT/TRAIN EA 15 MIN 2/10/2020 Wanda Desai, OT GO 1               Wanda Desai, OT  2/10/2020

## 2020-02-10 NOTE — PROGRESS NOTES
Reviewed chart.  CIWA scores 0-2.  No SI or HI.  Did not feel she needed any additional information from [facility] today. She states she is doing better.   Will follow.

## 2020-02-10 NOTE — PAYOR COMM NOTE
"Valentina Wilcox (59 y.o. Female)                           ATTENTION;   NURSE REVIEW, = MEDICAID PENDING CASE REF [id number],                        REPLY TO [facility] DEPT  [phone] , OR CALL  /EL LUKAS SVITLANA       Date of Birth Social Security Number Address Home Phone MRN    1960  [place]  [address] [facility] [id number] [phone] [id number]    Religious Marital Status          None Single       Admission Date Admission Type Admitting Provider Attending Provider Department, Room/Bed    20 Emergency Soren Cornejo MD Baumann, Patrick D, MD [address], S420/1    Discharge Date Discharge Disposition Discharge Destination                       Attending Provider:  Stanislav Gonzalez MD    Allergies:  Valsartan    Isolation:  None   Infection:  None   Code Status:  CPR    Ht:  170.2 cm (67\")   Wt:  72.6 kg (160 lb)    Admission Cmt:  None   Principal Problem:  Alcohol withdrawal syndrome without complication (CMS/[facility]) [F10.230]                 Active Insurance as of 2020     Primary Coverage     Payor Plan Insurance Group Employer/Plan Group    MEDICAID PENDING MEDICAID PENDING      Payor Plan Address Payor Plan Phone Number Payor Plan Fax Number Effective Dates    [facility]   2020 - None Entered    Subscriber Name Subscriber Birth Date Member ID       VALENTINA WILCOX 1960                  Emergency Contacts      (Rel.) Home Phone Work Phone Mobile Phone    NANCY WILCOX (Sister) [phone] -- --    NAVEEN ARTEAGA (Son) -- -- [phone]    VALE WILCOX (Sister) -- -- [phone]    MELISSA BARRIOS (Sister) [phone] -- --               History & Physical      Stingl, Reyna Carl MD at [phone]          HISTORY AND PHYSICAL   [facility]        Patient Identification:  Name: Valentina Wilcox  Age: 59 y.o.  Sex: female  :  1960  MRN: [id number]                   "   Primary Care Physician: Provider, No Known    Chief Complaint:  59 year old female was brought in after a fall at home; she was found on the floor; she was confused and had a bottle of vodka; she is unsure of what happened and is still mildly confused; she has a history of prior hemorrhagic stroke and gi bleeding; she also has a history of alcohol dependence; two sisters are at the bedside; the patient lives in an independent living facility and has had four falls within the last week per family; the patient does not remember this;     History of Present Illness:   As above    Past Medical History:  Past Medical History:   Diagnosis Date   • Alcohol abuse    • Allergic rhinitis    • Anemia    • Crohn's colitis (CMS/HCC)    • Depression    • GERD (gastroesophageal reflux disease)    • GI bleed    • Hepatitis C    • Hernia, diaphragmatic    • Hypertension    • IBS (irritable bowel syndrome)    • Iron deficiency    • Stroke (CMS/HCC)    • Syncope    • Syncope      Past Surgical History:  History reviewed. No pertinent surgical history.   Home Meds:  Medications Prior to Admission   Medication Sig Dispense Refill Last Dose   • acetaminophen (TYLENOL) 325 MG tablet Take 650 mg by mouth Every 6 (Six) Hours As Needed for Mild Pain .      • amLODIPine (NORVASC) 10 MG tablet Take 10 mg by mouth Daily.      • aspirin 81 MG EC tablet Take 81 mg by mouth Daily.      • atorvastatin (LIPITOR) 40 MG tablet Take 40 mg by mouth Every Night.      • carvedilol (COREG) 12.5 MG tablet Take 12.5 mg by mouth 2 (Two) Times a Day With Meals.      • cetirizine (zyrTEC) 5 MG tablet Take 5 mg by mouth Daily.      • DULoxetine (CYMBALTA) 60 MG capsule Take 60 mg by mouth Daily.      • famotidine (PEPCID) 40 MG tablet Take 20 mg by mouth 2 (Two) Times a Day.      • ferrous sulfate 325 (65 FE) MG tablet Take 325 mg by mouth Daily With Breakfast.      • folic acid (FOLVITE) 1 MG tablet Take 1 mg by mouth Daily.      • magnesium oxide (MAG-OX)  400 tablet tablet Take 400 mg by mouth Daily.      • melatonin 1 MG tablet Take 1 mg by mouth Every Night.      • Multiple Vitamins-Minerals (MULTIVITAMIN ADULT PO) Take  by mouth Daily.      • nitroglycerin (NITROSTAT) 0.4 MG SL tablet Place 0.4 mg under the tongue Every 5 (Five) Minutes As Needed for Chest Pain. Take no more than 3 doses in 15 minutes.      • ondansetron (ZOFRAN) 4 MG tablet Take 4 mg by mouth Every 6 (Six) Hours As Needed for Nausea or Vomiting.      • Polyethylene Glycol 3350 (MIRALAX PO) Take 17 g by mouth Daily As Needed.      • Pseudoephedrine-DM-GG (ROBAFEN CF PO) Take 10 mL by mouth 4 (Four) Times a Day As Needed.      • thiamine (VITAMIN B-1) 100 MG tablet Take 100 mg by mouth Daily.      • vitamin C (ASCORBIC ACID) 500 MG tablet Take 500 mg by mouth Daily.          Allergies:  Allergies   Allergen Reactions   • Valsartan Nausea And Vomiting     Immunizations:    There is no immunization history on file for this patient.  Social History:   Social History     Social History Narrative   • Not on file     Social History     Socioeconomic History   • Marital status: Single     Spouse name: Not on file   • Number of children: Not on file   • Years of education: Not on file   • Highest education level: Not on file   Tobacco Use   • Smoking status: Former Smoker     Last attempt to quit: 2019     Years since quittin.3   • Smokeless tobacco: Never Used   Substance and Sexual Activity   • Alcohol use: Yes     Alcohol/week: 14.0 standard drinks     Types: 14 Shots of liquor per week     Comment: 2 DRINKS/DAY SINCE NOV. GRADUALLY INCREASING   • Drug use: Not Currently   • Sexual activity: Defer       Family History:  History reviewed. No pertinent family history.     Review of Systems  Patient is confused and forgetful so cannot give an accurate ros.    Objective:  tMax 24 hrs: Temp (24hrs), Av.2 °F (36.8 °C), Min:97.7 °F (36.5 °C), Max:98.8 °F (37.1 °C)    Vitals Ranges:   Temp:  [97.7  "°F (36.5 °C)-98.8 °F (37.1 °C)] 97.7 °F (36.5 °C)  Heart Rate:  [60-94] 94  Resp:  [18-20] 20  BP: (114-140)/(73-94) 140/94      Exam:  /94 (BP Location: Left arm, Patient Position: Lying)   Pulse 94   Temp 97.7 °F (36.5 °C) (Oral)   Resp 20   Ht 170.2 cm (67\")   Wt 72.6 kg (160 lb)   SpO2 97%   BMI 25.06 kg/m²      General Appearance:     Alert, cooperative, no distress, appears older than stated age and anxious   Head:    Normocephalic, without obvious abnormality, atraumatic   Eyes:    PERRL, conjunctiva/corneas clear, EOM's intact, both eyes   Ears:    Normal external ear canals, both ears   Nose:   Nares normal, septum midline, mucosa normal, no drainage    or sinus tenderness   Throat:   Lips, mucosa, and tongue normal   Neck:   Supple, symmetrical, trachea midline, no adenopathy;     thyroid:  no enlargement/tenderness/nodules; no carotid    bruit or JVD   Back:     Symmetric, no curvature, ROM normal, no CVA tenderness   Lungs:     Clear to auscultation bilaterally, respirations unlabored   Chest Wall:    No tenderness or deformity    Heart:    Regular rate and rhythm, S1 and S2 normal, no murmur, rub   or gallop   Abdomen:     Soft, non-tender, bowel sounds active all four quadrants,     no masses, no hepatomegaly, no splenomegaly   Extremities:   Extremities normal, atraumatic, no cyanosis or edema; right arm with splint and ace wrap   Pulses:   2+ and symmetric all extremities   Skin:   Skin color, texture, turgor normal, no rashes or lesions   Lymph nodes:   Cervical, supraclavicular, and axillary nodes normal   Neurologic:   CNII-XII intact, normal strength, sensation intact throughout      .    Data Review:  Labs in chart were reviewed.  WBC   Date Value Ref Range Status   02/08/2020 7.03 3.40 - 10.80 10*3/mm3 Final     Hemoglobin   Date Value Ref Range Status   02/08/2020 9.2 (L) 12.0 - 15.9 g/dL Final     Hematocrit   Date Value Ref Range Status   02/08/2020 28.5 (L) 34.0 - 46.6 % Final "     Platelets   Date Value Ref Range Status   2020 351 140 - 450 10*3/mm3 Final     Sodium   Date Value Ref Range Status   2020 141 136 - 145 mmol/L Final     Potassium   Date Value Ref Range Status   2020 4.0 3.5 - 5.2 mmol/L Final     Chloride   Date Value Ref Range Status   2020 105 98 - 107 mmol/L Final     CO2   Date Value Ref Range Status   2020 20.1 (L) 22.0 - 29.0 mmol/L Final     BUN   Date Value Ref Range Status   2020 12 8 - 23 mg/dL Final     Creatinine   Date Value Ref Range Status   2020 1.13 (H) 0.57 - 1.00 mg/dL Final     Glucose   Date Value Ref Range Status   2020 109 (H) 65 - 99 mg/dL Final     Calcium   Date Value Ref Range Status   2020 8.7 8.6 - 10.5 mg/dL Final     Magnesium   Date Value Ref Range Status   2020 2.7 (H) 1.6 - 2.4 mg/dL Final     AST (SGOT)   Date Value Ref Range Status   2020 23 1 - 32 U/L Final     ALT (SGPT)   Date Value Ref Range Status   2020 10 1 - 33 U/L Final     Alkaline Phosphatase   Date Value Ref Range Status   2020 97 39 - 117 U/L Final                Imaging Results (All)     Procedure Component Value Units Date/Time    CT Upper Extremity Right Without Contrast [[id number]] Collected:  20 1317     Updated:  20 1321    Narrative:       CT RIGHT ELBOW WITHOUT CONTRAST     HISTORY: Recent fall. Right elbow fracture.     TECHNIQUE/FINDINGS: The CT scan was performed with thin axial images  followed by coronal and sagittal reconstructions and demonstrates the  followin. There is a fracture of the posterior half of the olecranon with  several fracture fragments extending posteriorly and superiorly with the  largest fracture fragment measuring up to 2.1 x 2.7 cm.  2. There may also be a minimal fracture involving the medial margin of  the medial humeral epicondyle. There is some associated joint effusion  or hemarthrosis present.                 Radiation dose reduction  techniques were utilized, including automated  exposure control and exposure modulation based on body size.     This report was finalized on 2/8/2020 1:18 PM by Dr. Joe Kwan M.D.       XR Elbow 2 View Right [[id number]] Collected:  02/08/20 0915     Updated:  02/08/20 0923    Narrative:       THREE-VIEW RIGHT ELBOW     HISTORY: Fell. Elbow pain.     FINDINGS: There are displaced fracture fragments involving the medial  epicondyle with superior displacement of the fracture fragments  measuring up to 4.5 cm. These fracture fragments are adjacent to the  posterior margin of the distal humerus and some of the fracture margins  appear fairly smooth and potentially related to long-standing injury and  clinical correlation is therefore required. I suspect there is also an  acute component with associated large joint effusion as well as some  soft tissue swelling in this region.     This report was finalized on 2/8/2020 9:20 AM by Dr. Joe Kwan M.D.       CT Head Without Contrast [[id number]] Collected:  02/08/20 0919     Updated:  02/08/20 0923    Narrative:       CT BRAIN WITHOUT CONTRAST     HISTORY: Fell. Confusion.     FINDINGS: The CT scan was performed as an emergency procedure through  the brain without contrast. There is mild diffuse atrophy and chronic  small vessel ischemic change. There is a localized area of  encephalomalacia in the right parietal region most consistent with old  infarct. There is no evidence of acute hemorrhage or mass effect and the  visualized sinuses are clear.                 Radiation dose reduction techniques were utilized, including automated  exposure control and exposure modulation based on body size.     This report was finalized on 2/8/2020 9:20 AM by Dr. Joe Kwan M.D.           Patient Active Problem List   Diagnosis Code   • Fall W19.XXXA       Assessment:    Fall  alcohol dependence  Right elbow fracture  H.o cva  anemia  Alcohol intoxication poa  Hepatitis  "c  Plan:  Initiate ciwa protocol  Monitor on telemetry due to likely alcohol withdrawal  D.w patient and sisters  Awaiting input from orthopedics regarding her fractured elbow  Monitor hgb  D.w family, nurse, patient  Will likely need a higher level of care after discharge  Access center to see as well    Reyna Cao MD  2/8/2020  4:56 PM      Electronically signed by Reyna Cao MD at 02/08/20 1702          Emergency Department Notes      Nancy Garcia, RN at 02/08/20 0752        Patient presents to er via ems from [facility] assisted living.  Patient was observed on floor leaning on bed with a vodka bottle.  Last known normal yesterday around 1900.  Patient would like assistance with alcohol addiction.    Electronically signed by Nancy Garcia, RN at 02/08/20 0754     Gustavo Alvarez MD at 02/08/20 0804      Procedure Orders    1. Splint - Cast - Strapping [[id number]] ordered by Gustavo Alvarez MD at 02/08/20 1109                 EMERGENCY DEPARTMENT ENCOUNTER    Room Number:  33/33  Date of encounter:  2/8/2020  PCP: Provider, No Known  Historian: Patient and EMS      HPI:  Chief Complaint: Confusion  A complete HPI/ROS/PMH/PSH/SH/FH are unobtainable due to: Mental status change  Context: Marylin Wilcox is a 59 y.o. female who presents to the ED from [facility] after pt was found down on the floor this AM, confused and drowsy, with a bottle of vodka, per EMS. She is unsure why she was brought here by EMS and states she \"feels fine\". Hx of two strokes with residual L sided deficits. Her last stroke was in 2019. She states she drinks approx. 2 drinks per day.    MEDICAL HISTORY REVIEW  Pt was admitted to [facility] on 9/28/2019 for cerebral brain hemorrhage and GI hemorrhage.    PAST MEDICAL HISTORY  Active Ambulatory Problems     Diagnosis Date Noted   • No Active Ambulatory Problems     Resolved Ambulatory Problems     Diagnosis Date Noted   • No Resolved Ambulatory " Problems     No Additional Past Medical History         PAST SURGICAL HISTORY  No past surgical history on file.      FAMILY HISTORY  No family history on file.      SOCIAL HISTORY  Social History     Socioeconomic History   • Marital status: Single     Spouse name: Not on file   • Number of children: Not on file   • Years of education: Not on file   • Highest education level: Not on file         ALLERGIES  Valsartan        REVIEW OF SYSTEMS  Review of Systems   Unable to perform ROS: Mental status change   Psychiatric/Behavioral: Positive for confusion (per EMS).        All systems reviewed and negative except for those discussed in HPI.       PHYSICAL EXAM    I have reviewed the triage vital signs and nursing notes.    ED Triage Vitals [02/08/20 0756]   Temp Heart Rate Resp BP SpO2   98 °F (36.7 °C) 60 18 124/92 95 %      Temp src Heart Rate Source Patient Position BP Location FiO2 (%)   Tympanic Monitor -- -- --       Physical Exam    Physical Exam   Constitutional: No distress.   HENT:  Head: Normocephalic and atraumatic. Atraumatic calvarium.  Oropharynx: Mucous membranes are moist.   Eyes: No scleral icterus. Conjunctival pallor.  Neck: Painless range of motion noted. Neck supple.   Cardiovascular: Normal rate, regular rhythm and intact distal pulses.  Pulmonary/Chest: No respiratory distress. There are no wheezes, no rhonchi, and no rales.   Abdominal: Soft. There is no focal tenderness. There is no rebound and no guarding. She is mildly distended.  Musculoskeletal: There is no pedal edema or calf tenderness. Her external back is atraumatic. There is no midline tenderness to palpation of the C or T spine. She has painless ROM of the neck.  Neurological: Alert. Her speech is slightly garbled, but intelligible. She is able to identify herself and her sisters, at bedside, but is unable to identify the date or location. Baseline sensation noted. She has inability to look to the left. She is only able to raise her  L arm off the bed against gravity. She has mild contracture of the L hand. She is able to raise both legs off the bed, but her L is weaker (due to prior stroke).  Skin: Skin is pink, warm, and dry. No pallor. There is diffuse ecchymosis to the BUE with varying age. There is a large bruise to her R elbow.  Psychiatric: Mood and affect normal.   Nursing note and vitals reviewed.    LAB RESULTS  Recent Results (from the past 24 hour(s))   POC Glucose Once    Collection Time: 02/08/20  8:17 AM   Result Value Ref Range    Glucose 111 70 - 130 mg/dL   Comprehensive Metabolic Panel    Collection Time: 02/08/20  8:40 AM   Result Value Ref Range    Glucose 109 (H) 65 - 99 mg/dL    BUN 12 8 - 23 mg/dL    Creatinine 1.13 (H) 0.57 - 1.00 mg/dL    Sodium 141 136 - 145 mmol/L    Potassium 4.0 3.5 - 5.2 mmol/L    Chloride 105 98 - 107 mmol/L    CO2 20.1 (L) 22.0 - 29.0 mmol/L    Calcium 8.7 8.6 - 10.5 mg/dL    Total Protein 7.6 6.0 - 8.5 g/dL    Albumin 4.50 3.50 - 5.20 g/dL    ALT (SGPT) 10 1 - 33 U/L    AST (SGOT) 23 1 - 32 U/L    Alkaline Phosphatase 97 39 - 117 U/L    Total Bilirubin 0.2 0.2 - 1.2 mg/dL    eGFR Non African Amer 49 (L) >60 mL/min/1.73    Globulin 3.1 gm/dL    A/G Ratio 1.5 g/dL    BUN/Creatinine Ratio 10.6 7.0 - 25.0    Anion Gap 15.9 (H) 5.0 - 15.0 mmol/L   Protime-INR    Collection Time: 02/08/20  8:40 AM   Result Value Ref Range    Protime 15.2 (H) 11.7 - 14.2 Seconds    INR 1.23 (H) 0.90 - 1.10   Troponin    Collection Time: 02/08/20  8:40 AM   Result Value Ref Range    Troponin T <0.010 0.000 - 0.030 ng/mL   Magnesium    Collection Time: 02/08/20  8:40 AM   Result Value Ref Range    Magnesium 2.7 (H) 1.6 - 2.4 mg/dL   Acetaminophen Level    Collection Time: 02/08/20  8:40 AM   Result Value Ref Range    Acetaminophen <5.0 (L) 10.0 - 30.0 mcg/mL   Ethanol    Collection Time: 02/08/20  8:40 AM   Result Value Ref Range    Ethanol 414 (H) 0 - 10 mg/dL    Ethanol % 0.414 %   Salicylate Level    Collection Time:  02/08/20  8:40 AM   Result Value Ref Range    Salicylate 0.3 <=30.0 mg/dL   CBC Auto Differential    Collection Time: 02/08/20  8:40 AM   Result Value Ref Range    WBC 7.03 3.40 - 10.80 10*3/mm3    RBC 2.94 (L) 3.77 - 5.28 10*6/mm3    Hemoglobin 9.2 (L) 12.0 - 15.9 g/dL    Hematocrit 28.5 (L) 34.0 - 46.6 %    MCV 96.9 79.0 - 97.0 fL    MCH 31.3 26.6 - 33.0 pg    MCHC 32.3 31.5 - 35.7 g/dL    RDW 15.3 12.3 - 15.4 %    RDW-SD 53.1 37.0 - 54.0 fl    MPV 8.5 6.0 - 12.0 fL    Platelets 351 140 - 450 10*3/mm3    Neutrophil % 57.8 42.7 - 76.0 %    Lymphocyte % 30.7 19.6 - 45.3 %    Monocyte % 7.4 5.0 - 12.0 %    Eosinophil % 2.1 0.3 - 6.2 %    Basophil % 1.7 (H) 0.0 - 1.5 %    Immature Grans % 0.3 0.0 - 0.5 %    Neutrophils, Absolute 4.06 1.70 - 7.00 10*3/mm3    Lymphocytes, Absolute 2.16 0.70 - 3.10 10*3/mm3    Monocytes, Absolute 0.52 0.10 - 0.90 10*3/mm3    Eosinophils, Absolute 0.15 0.00 - 0.40 10*3/mm3    Basophils, Absolute 0.12 0.00 - 0.20 10*3/mm3    Immature Grans, Absolute 0.02 0.00 - 0.05 10*3/mm3    nRBC 0.0 0.0 - 0.2 /100 WBC   CK    Collection Time: 02/08/20  8:40 AM   Result Value Ref Range    Creatine Kinase 238 (H) 20 - 180 U/L       Ordered the above labs and independently reviewed the results.        RADIOLOGY  Xr Elbow 2 View Right    Result Date: 2/8/2020  THREE-VIEW RIGHT ELBOW  HISTORY: Fell. Elbow pain.  FINDINGS: There are displaced fracture fragments involving the medial epicondyle with superior displacement of the fracture fragments measuring up to 4.5 cm. These fracture fragments are adjacent to the posterior margin of the distal humerus and some of the fracture margins appear fairly smooth and potentially related to long-standing injury and clinical correlation is therefore required. I suspect there is also an acute component with associated large joint effusion as well as some soft tissue swelling in this region.  This report was finalized on 2/8/2020 9:20 AM by Dr. Joe Kwan M.D.      Ct  Head Without Contrast    Result Date: 2/8/2020  CT BRAIN WITHOUT CONTRAST  HISTORY: Fell. Confusion.  FINDINGS: The CT scan was performed as an emergency procedure through the brain without contrast. There is mild diffuse atrophy and chronic small vessel ischemic change. There is a localized area of encephalomalacia in the right parietal region most consistent with old infarct. There is no evidence of acute hemorrhage or mass effect and the visualized sinuses are clear.      Radiation dose reduction techniques were utilized, including automated exposure control and exposure modulation based on body size.  This report was finalized on 2/8/2020 9:20 AM by Dr. Joe Kwan M.D.        I ordered the above noted radiological studies. Reviewed by me and discussed with radiologist.  See dictation for official radiology interpretation.      PROCEDURES    Splint - Cast - Strapping  Date/Time: 2/8/2020 11:09 AM  Performed by: Gustavo Alvarez MD  Authorized by: Gustavo Alvarez MD     Consent:     Consent obtained:  Verbal    Consent given by:  Patient    Risks discussed:  Numbness and pain  Pre-procedure details:     Sensation:  Normal  Procedure details:     Laterality:  Right    Location:  Elbow    Elbow:  R elbow    Strapping: no      Splint type:  Long arm    Supplies:  Elastic bandage, Ortho-Glass, cotton padding and sling  Post-procedure details:     Pain:  Unchanged    Sensation:  Normal    Patient tolerance of procedure:  Tolerated well, no immediate complications        EKG           EKG time: 0826  Rhythm/Rate: NSR rate 70  P waves and NY: Prolonged NY segment  QRS, axis: Narrow QRS complex  ST and T waves:   No STEMI  QTC is within normal limits    Interpreted Contemporaneously by me, independently viewed  Comparison: No old for comparison.      MEDICATIONS GIVEN IN ER    Medications   sodium chloride 0.9 % flush 10 mL (has no administration in time range)   multiple vitamin (M.V.I. Adult) 10 mL, thiamine (B-1) 100  mg, folic acid 1 mg, magnesium sulfate 2 g in sodium chloride 0.9 % 1,000 mL infusion (1,000 mL/hr Intravenous New Bag 2/8/20 0839)   acetaminophen (TYLENOL) tablet 1,000 mg (1,000 mg Oral Given 2/8/20 0942)         PROGRESS, DATA ANALYSIS, CONSULTS, AND MEDICAL DECISION MAKING    0801 Ordered labs, EKG, UDS, ethanol level, acetaminophen level, POC glucose, and CT head for further evaluation.    0813 Ordered XR R elbow for further evaluation.    0924 Per pt family, pt is c/o HA. Ordered tylenol. Placed call to [facility] for admission.    0925 Discussed results of pt CT and XR with radiologist, Dr Kwan.    0940 Discussed pt with Dr. Cao, on call for Dr. Cornejo, [facility], who agrees to admit. He requests ortho consult and admission for observation to telemetry.    1004 Rechecked with pt, who is resting comfortably, with two sisters at bedside. Pt is now more coherent at this time. She states that her R elbow has been bruised and tender for approx. 3 months. I have informed her and her family of the results of her work up and that she will need to be admitted for her fractured R elbow and ortho consult. She requires admission as she does not have much functionality of her L side to compensate with the R elbow fracture. Plan to admit after she is splinted. Pt and pt family understand and agree with the plan, all questions answered.    1108 Discussed pt with Dr. Beal, ortho, who will consult and requests a CT be obtained of the R elbow.    1110 Rechecked with pt and applied splint. Plan to admit as previously discussed.    All labs have been independently reviewed by me.  All radiology studies have been reviewed by me and discussed with radiologist dictating the report.   EKG's independently viewed and interpreted by me.  Discussion below represents my analysis of pertinent findings related to patient's condition, differential diagnosis, treatment plan and final disposition.       AS OF 9:53 AM VITALS:    BP -  130/89  HR - 75  TEMP - 98 °F (36.7 °C) (Tympanic)  O2 SATS - 97%        DIAGNOSIS  Final diagnoses:   Fall, initial encounter   Acute alcoholic intoxication in alcoholism with complication (CMS/HCC)   Elbow fracture, right, closed, initial encounter   Left hemiparesis (CMS/[facility])         DISPOSITION  ADMISSION TO TELEMETRY    Discussed treatment plan and reason for admission with pt/family and admitting physician.  Pt/family voiced understanding of the plan for admission for further testing/treatment as needed.     Documentation assistance provided by giovanny Villatoro for Gustavo Alvarez MD.  Information recorded by the scrsushmae was done at my direction and has been verified and validated by me.     Maine Villatoro  02/08/20 1116       Gustavo Alvarez MD  02/08/20 1154      Electronically signed by Gustavo Alvarez MD at 02/08/20 1154       Vital Signs (last 3 days)     Date/Time   Temp   Temp src   Pulse   Resp   BP   Patient Position   SpO2    02/10/20 1300   98.2 (36.8)   Oral   92   18   --   --   99    02/10/20 0740   98 (36.7)   Oral   78   18   159/98   Lying   99    02/09/20 1956   97.8 (36.6)   Oral   85   16   151/93   Sitting   --    02/09/20 1801   --   --   87   --   148/90   --   --    02/09/20 1700   --   --   93   18   --   --   --    02/09/20 1327   97.9 (36.6)   Oral   88   16   164/98   Lying   96    02/09/20 0737   97.8 (36.6)   Oral   93   16   (!) 165/101 reported to patients nurse   Lying   --    BP: reported to patients nurse at 02/09/20 0737    02/08/20 2318   98.1 (36.7)   Oral   90   18   143/91   Lying   94    02/08/20 2248   --   --   95   --   148/85   --   --    02/08/20 1955   --   --   97   20   148/85   Lying   95    02/08/20 1317   97.7 (36.5)   Oral   94   20   140/94   Lying   97    02/08/20 1211   98.8 (37.1)   Oral   78   20   134/82   Lying   96    02/08/20 1100   --   --   80   --   118/80   --   --    02/08/20 1030   --   --   76   --   114/73   --   94    02/08/20 0948   --   --    75   --   119/91   --   96    02/08/20 0900   --   --   75   --   --   --   97    02/08/20 0829   --   --   --   --   130/89   --   --    02/08/20 0756   98 (36.7)   Tympanic   60   18   124/92   --   95                     CIWA (last 3 days)     Date/Time CIWA-Ar Score    02/10/20 1415  0    02/10/20 0900  0    02/09/20 2126  1    02/09/20 1600  1    02/09/20 1400  2    02/09/20 1200  4    02/09/20 0800  4    02/09/20 0600  13    02/09/20 0400  7    02/09/20 0032  0    02/08/20 2221  12    02/08/20 1700  0    02/08/20 1157  0          Facility-Administered Medications as of 2/10/2020   Medication Dose Route Frequency Provider Last Rate Last Dose   • [COMPLETED] acetaminophen (TYLENOL) tablet 1,000 mg  1,000 mg Oral Once Gustavo Alvarez MD   1,000 mg at 02/08/20 0942   • acetaminophen (TYLENOL) tablet 650 mg  650 mg Oral Q6H PRN Gloria Mathis APRN   650 mg at 02/10/20 0904   • amLODIPine (NORVASC) tablet 10 mg  10 mg Oral Daily Gloria Mathis APRN   10 mg at 02/10/20 0905   • aspirin EC tablet 81 mg  81 mg Oral Daily Gloria Mathis APRN   81 mg at 02/10/20 0906   • atorvastatin (LIPITOR) tablet 40 mg  40 mg Oral Nightly Gloria Mathis APRN   40 mg at 02/09/20 2036   • carvedilol (COREG) tablet 12.5 mg  12.5 mg Oral BID With Meals Gloria Mathis APRN   12.5 mg at 02/10/20 0905   • DULoxetine (CYMBALTA) DR capsule 60 mg  60 mg Oral Daily Gloria Mathis APRN   60 mg at 02/10/20 0904   • famotidine (PEPCID) tablet 20 mg  20 mg Oral BID AC Gloria Mathis APRN   20 mg at 02/10/20 0906   • ferrous sulfate tablet 325 mg  325 mg Oral Daily With Breakfast Gloria Mathis APRN   325 mg at 02/10/20 0904   • folic acid (FOLVITE) tablet 1 mg  1 mg Oral Daily Gloria Mathis APRN   1 mg at 02/10/20 0904   • LORazepam (ATIVAN) tablet 1 mg  1 mg Oral Q2H PRN Reyna Cao MD        Or   • LORazepam (ATIVAN) injection 1 mg  1 mg Intravenous Q2H PRN Reyna Cao  MD Siddhartha        Or   • LORazepam (ATIVAN) tablet 2 mg  2 mg Oral Q1H PRN StingReyna stafford MD   2 mg at 02/09/20 0612    Or   • LORazepam (ATIVAN) injection 2 mg  2 mg Intravenous Q1H PRN Reyna Cao MD        Or   • LORazepam (ATIVAN) injection 2 mg  2 mg Intravenous Q15 Min PRN Reyna Cao MD        Or   • LORazepam (ATIVAN) injection 2 mg  2 mg Intramuscular Q15 Min PRN Reyna Cao MD       • magnesium oxide (MAG-OX) tablet 400 mg  400 mg Oral Daily Gloria Mathis APRN   400 mg at 02/10/20 0904   • Magnesium Sulfate 2 gram Bolus, followed by 8 gram infusion (total Mg dose 10 grams)- Mg less than or equal to 1mg/dL  2 g Intravenous PRN Stanislav Gonzalez MD        Or   • Magnesium Sulfate 2 gram / 50mL Infusion (GIVE X 3 BAGS TO EQUAL 6GM TOTAL DOSE) - Mg 1.1 - 1.5 mg/dl  2 g Intravenous PRN Stanislav Gonzalez MD        Or   • Magnesium Sulfate 4 gram infusion- Mg 1.6-1.9 mg/dL  4 g Intravenous PRN Stanislav Gonzalez MD       • melatonin tablet 1 mg  1 mg Oral Nightly Gloria Mathis APRN   1 mg at 02/09/20 2036   • [COMPLETED] multiple vitamin (M.V.I. Adult) 10 mL, thiamine (B-1) 100 mg, folic acid 1 mg, magnesium sulfate 2 g in sodium chloride 0.9 % 1,000 mL infusion  1,000 mL/hr Intravenous Once Gustavo Alvarez MD   Stopped at 02/08/20 1041   • nitroglycerin (NITROSTAT) SL tablet 0.4 mg  0.4 mg Sublingual Q5 Min PRN Gloria Mathis APRN       • ondansetron (ZOFRAN) tablet 4 mg  4 mg Oral Q6H PRN Reyna Cao MD        Or   • ondansetron (ZOFRAN) injection 4 mg  4 mg Intravenous Q6H PRN Reyna Cao MD       • ondansetron (ZOFRAN) tablet 4 mg  4 mg Oral Q6H PRN Gloria Mathis APRN       • polyethylene glycol (MIRALAX) powder 17 g  17 g Oral Daily PRN Gloria Mathis APRN       • potassium chloride (MICRO-K) CR capsule 40 mEq  40 mEq Oral PRN Stanislav Gonzalez MD   40 mEq at 02/10/20 1442    Or   • potassium chloride  (KLOR-CON) packet 40 mEq  40 mEq Oral PRN Stanislav Gonzalez MD        Or   • potassium chloride 10 mEq in 100 mL IVPB  10 mEq Intravenous Q1H PRN Stanislav Gonzalez MD       • sodium chloride 0.9 % flush 10 mL  10 mL Intravenous PRN Gustavo Alvarez MD       • sodium chloride 0.9 % infusion  100 mL/hr Intravenous Continuous StingReyna stafford  mL/hr at 02/09/20 1036 100 mL/hr at 02/09/20 1036   • thiamine (VITAMIN B-1) tablet 100 mg  100 mg Oral Daily Gloria Mathis APRN   100 mg at 02/10/20 0904     Orders (last 72 hrs)      Start     Ordered    02/11/20 0600  Vitamin B12  Morning Draw      02/10/20 0858    02/11/20 0600  Folate  Morning Draw      02/10/20 0858    02/10/20 1600  Hemoglobin & Hematocrit, Blood  Once      02/10/20 0858    02/10/20 1435  Inpatient Admission  Once      02/10/20 1435    02/10/20 1025  Occult Blood X 1, Stool - Stool, Per Rectum  Once      02/10/20 1024    02/10/20 0859  Place Sequential Compression Device  Once      02/10/20 0858    02/10/20 0859  Maintain Sequential Compression Device  Continuous      02/10/20 0858    02/10/20 0859  Patient Currently On Electrolyte Replacement Protocol - Please Refer to MAR for Protocol Details  Misc Nursing Order (Specify)  Daily     Comments:  Patient Currently On Electrolyte Replacement Protocol - Please Refer to MAR for Protocol Details    02/10/20 0858    02/10/20 0858  Magnesium Sulfate 2 gram Bolus, followed by 8 gram infusion (total Mg dose 10 grams)- Mg less than or equal to 1mg/dL  As Needed      02/10/20 0858    02/10/20 0858  Magnesium Sulfate 2 gram / 50mL Infusion (GIVE X 3 BAGS TO EQUAL 6GM TOTAL DOSE) - Mg 1.1 - 1.5 mg/dl  As Needed      02/10/20 0858    02/10/20 0858  Magnesium Sulfate 4 gram infusion- Mg 1.6-1.9 mg/dL  As Needed      02/10/20 0858    02/10/20 0858  potassium chloride (MICRO-K) CR capsule 40 mEq  As Needed      02/10/20 0858    02/10/20 0858  potassium chloride (KLOR-CON) packet 40 mEq  As Needed       02/10/20 0858    02/10/20 0858  potassium chloride 10 mEq in 100 mL IVPB  Every 1 Hour PRN      02/10/20 0858    02/10/20 0858  Ferritin  Once      02/10/20 0858    02/10/20 0858  TSH Rfx On Abnormal To Free T4  Once      02/10/20 0858    02/10/20 0858  Code Status and Medical Interventions:  Continuous      02/10/20 0858    02/10/20 0857  Iron Profile  Once      02/10/20 0858    02/10/20 0600  Basic Metabolic Panel  Morning Draw      02/09/20 1857    02/10/20 0600  CBC (No Diff)  Morning Draw      02/09/20 1857    02/10/20 0600  Magnesium  Morning Draw      02/09/20 1857 02/09/20 1857  OT Consult: Eval & Treat  Once      02/09/20 1857 02/09/20 1857  PT Consult: Eval & Treat As Tolerated; Discharge Placement Assessment  Once      02/09/20 1857 02/09/20 1107  Diet Regular; Cardiac  Diet Effective Now      02/09/20 1106    02/09/20 0900  amLODIPine (NORVASC) tablet 10 mg  Daily      02/08/20 2149 02/09/20 0900  aspirin EC tablet 81 mg  Daily      02/08/20 2149 02/09/20 0900  DULoxetine (CYMBALTA) DR capsule 60 mg  Daily      02/08/20 2149 02/09/20 0900  folic acid (FOLVITE) tablet 1 mg  Daily      02/08/20 2149 02/09/20 0900  magnesium oxide (MAG-OX) tablet 400 mg  Daily      02/08/20 2149 02/09/20 0900  thiamine (VITAMIN B-1) tablet 100 mg  Daily      02/08/20 2149 02/09/20 0800  ferrous sulfate tablet 325 mg  Daily With Breakfast      02/08/20 2149 02/09/20 0730  famotidine (PEPCID) tablet 20 mg  2 Times Daily Before Meals      02/08/20 2149 02/08/20 2245  atorvastatin (LIPITOR) tablet 40 mg  Nightly      02/08/20 2149 02/08/20 2245  carvedilol (COREG) tablet 12.5 mg  2 Times Daily With Meals      02/08/20 2149    02/08/20 2245  melatonin tablet 1 mg  Nightly      02/08/20 2149 02/08/20 2148  polyethylene glycol (MIRALAX) powder 17 g  Daily PRN      02/08/20 2149 02/08/20 2148  ondansetron (ZOFRAN) tablet 4 mg  Every 6 Hours PRN      02/08/20 2149 02/08/20 2148   nitroglycerin (NITROSTAT) SL tablet 0.4 mg  Every 5 Minutes PRN      02/08/20 2149    02/08/20 2148  acetaminophen (TYLENOL) tablet 650 mg  Every 6 Hours PRN      02/08/20 2149    02/08/20 1815  sodium chloride 0.9 % infusion  Continuous      02/08/20 1719 02/08/20 1719  Inpatient Access Center Consult  Once     Provider:  (Not yet assigned)    02/08/20 1719 02/08/20 1719  Fall Precautions  Continuous      02/08/20 1719 02/08/20 1717  ondansetron (ZOFRAN) tablet 4 mg  Every 6 Hours PRN      02/08/20 1719    02/08/20 1717  ondansetron (ZOFRAN) injection 4 mg  Every 6 Hours PRN      02/08/20 1719    02/08/20 1717  LORazepam (ATIVAN) tablet 1 mg  Every 2 Hours PRN      02/08/20 1719 02/08/20 1717  LORazepam (ATIVAN) injection 1 mg  Every 2 Hours PRN      02/08/20 1719 02/08/20 1717  LORazepam (ATIVAN) tablet 2 mg  Every 1 Hour PRN      02/08/20 1719 02/08/20 1717  LORazepam (ATIVAN) injection 2 mg  Every 1 Hour PRN      02/08/20 1719 02/08/20 1717  LORazepam (ATIVAN) injection 2 mg  Every 15 Minutes PRN      02/08/20 1719 02/08/20 1717  LORazepam (ATIVAN) injection 2 mg  Every 15 Minutes PRN      02/08/20 1719 02/08/20 1716  Safety Precautions  Continuous      02/08/20 1719 02/08/20 1716  Vital Signs  Per Hospital Policy      02/08/20 1719 02/08/20 1716  Continuous Pulse Oximetry  Continuous      02/08/20 1719 02/08/20 1716  Obtain Baseline Clinical [place] Withdrawal Assessment - Ar, Sedation Scale & Vital Signs  Once     Comments:  Follow CIWA Scoring Reference Guide    02/08/20 1719 02/08/20 1716  Clinical [place] Withdrawal Assessment (CIWA-Ar)  Per Hospital Policy      02/08/20 1719 02/08/20 1716  If CIWA Score Less Than 8 Monitor Every 4 Hours & Then Discontinue Assessment - Restart Scoring Assessment & Protocol If Symptoms Reappear  Continuous      02/08/20 1719    02/08/20 1716  Obtain Pre & Post Sedation Scores With Every Lorazepam Dose - Hold For POSS Greater Than  2 or RASS of -3 or Less  Continuous      02/08/20 1719    02/08/20 1716  Seizure Precautions  Continuous      02/08/20 1719    02/08/20 1716  Notify Provider - Withdrawal  Until Discontinued      02/08/20 1719    02/08/20 1716  Notify Provider of Abnormal Lab Results  Until Discontinued      02/08/20 1719    02/08/20 1716  Notify Provider - Vitals  Until Discontinued      02/08/20 1719    02/08/20 1350  Inpatient Case Management  Consult  Once     Provider:  (Not yet assigned)    02/08/20 1350    02/08/20 1110  Splint Cast Strapping  Once     Comments:  This order was created via procedure documentation    02/08/20 1109    02/08/20 1109  CT Upper Extremity Right Without Contrast  1 Time Imaging      02/08/20 1109    02/08/20 0944  Initiate Observation Status  Once      02/08/20 0943    02/08/20 0944  Tele Bed Request  Once      02/08/20 0943    02/08/20 0942  Ortho (on-call MD unless specified)  Once     Specialty:  Orthopedic Surgery  Provider:  (Not yet assigned)    02/08/20 0942    02/08/20 0926  acetaminophen (TYLENOL) tablet 1,000 mg  Once      02/08/20 0924    02/08/20 0925  [facility] (on-call MD unless specified) Details  Once     Specialty:  Hospitalist  Provider:  (Not yet assigned)    02/08/20 0924    02/08/20 0925  Obtain & Apply The Following- Upper extremity; Sling  Once      02/08/20 0925    02/08/20 0844  XR Elbow 2 View Right  1 Time Imaging      02/08/20 0813    02/08/20 0820  POC Glucose Once  Once      02/08/20 0817    02/08/20 0814  POC Glucose STAT  STAT      02/08/20 0813    02/08/20 0814  XR Elbow 3+ View Right  1 Time Imaging,   Status:  Canceled      02/08/20 0813    02/08/20 0809  CT Head Without Contrast  1 Time Imaging      02/08/20 0808    02/08/20 0806  CK  STAT      02/08/20 0805    02/08/20 0803  multiple vitamin (M.V.I. Adult) 10 mL, thiamine (B-1) 100 mg, folic acid 1 mg, magnesium sulfate 2 g in sodium chloride 0.9 % 1,000 mL infusion  Once      02/08/20 0801    02/08/20  0802  Urinalysis With Culture If Indicated - Urine, Catheter  Once      02/08/20 0801    02/08/20 0802  Magnesium  STAT      02/08/20 0801    02/08/20 0802  Monitor Blood Pressure  Per Hospital Policy      02/08/20 0801    02/08/20 0802  Cardiac Monitoring  Once      02/08/20 0801    02/08/20 0802  Pulse Oximetry, Continuous  Continuous,   Status:  Canceled      02/08/20 0801    02/08/20 0802  Acetaminophen Level  Once      02/08/20 0801    02/08/20 0802  Ethanol  Once      02/08/20 0801    02/08/20 0802  Urine Drug Screen - Urine, Clean Catch  Once      02/08/20 0801    02/08/20 0802  Salicylate Level  Once      02/08/20 0801    02/08/20 0801  CBC & Differential  Once      02/08/20 0801    02/08/20 0801  Comprehensive Metabolic Panel  Once      02/08/20 0801    02/08/20 0801  Protime-INR  Once      02/08/20 0801    02/08/20 0801  Troponin  Once      02/08/20 0801    02/08/20 0801  ECG 12 Lead  Once      02/08/20 0801    02/08/20 0801  Insert peripheral IV  Once      02/08/20 0801    02/08/20 0801  CBC Auto Differential  PROCEDURE ONCE      02/08/20 0801    02/08/20 0800  sodium chloride 0.9 % flush 10 mL  As Needed      02/08/20 0801    Unscheduled  Magnesium  As Needed      02/10/20 0858    Unscheduled  Potassium  As Needed      02/10/20 0858    --  amLODIPine (NORVASC) 10 MG tablet  Daily      02/08/20 1041    --  aspirin 81 MG EC tablet  Daily      02/08/20 1041    --  cetirizine (zyrTEC) 5 MG tablet  Daily      02/08/20 1041    --  DULoxetine (CYMBALTA) 60 MG capsule  Daily      02/08/20 1041    --  ferrous sulfate 325 (65 FE) MG tablet  Daily With Breakfast      02/08/20 1041    --  folic acid (FOLVITE) 1 MG tablet  Daily      02/08/20 1041    --  magnesium oxide (MAG-OX) 400 tablet tablet  Daily      02/08/20 1041    --  Multiple Vitamins-Minerals (MULTIVITAMIN ADULT PO)  Daily      02/08/20 1041    --  vitamin C (ASCORBIC ACID) 500 MG tablet  Daily      02/08/20 1041    --  carvedilol (COREG) 12.5 MG tablet   2 Times Daily With Meals      20 1041    --  famotidine (PEPCID) 40 MG tablet  2 Times Daily      20 1041    --  thiamine (VITAMIN B-1) 100 MG tablet  Daily      20 1041    --  atorvastatin (LIPITOR) 40 MG tablet  Nightly      20 1041    --  acetaminophen (TYLENOL) 325 MG tablet  Every 6 Hours PRN      20 1041    --  Polyethylene Glycol 3350 (MIRALAX PO)  Daily PRN      20 1041    --  nitroglycerin (NITROSTAT) 0.4 MG SL tablet  Every 5 Minutes PRN      20 1041    --  ondansetron (ZOFRAN) 4 MG tablet  Every 6 Hours PRN      20 1041    --  Pseudoephedrine-DM-GG (ROBAFEN CF PO)  4 Times Daily PRN,   Status:  Discontinued      20 1041    --  melatonin 1 MG tablet  Nightly      20 1057    --  dextromethorphan-guaifenesin (ROBITUSSIN-DM)  MG/5ML syrup  4 Times Daily PRN      20 1711                   Physician Progress Notes       Stanislav Gonzalez MD at 02/10/20 1018              Name: Marylin Wilcox ADMIT: 2020   : 1960  PCP: Provider, No Known    MRN: [id number] LOS: 0 days   AGE/SEX: 59 y.o. female  ROOM: [facility]   Subjective   Chief Complaint   Patient presents with   • Alcohol Intoxication   • Fall     unwitnessed      Awake alert and feeling improved. Reporting no CP SOA NVD. Has left knee pain which is chronic and related to OA. She received cortisol injection previously and is followed by [facility].    Objective   Vital Signs  Temp:  [97.8 °F (36.6 °C)-98 °F (36.7 °C)] 98 °F (36.7 °C)  Heart Rate:  [78-93] 78  Resp:  [16-18] 18  BP: (148-164)/(90-98) 159/98  SpO2:  [96 %-99 %] 99 %  on   ;   Device (Oxygen Therapy): room air  Body mass index is 25.06 kg/m².    Physical Exam   Constitutional: She appears well-developed. No distress.   frail   HENT:   Head: Atraumatic.   Nose: Nose normal.   Eyes: Conjunctivae and EOM are normal.   Neck: Neck supple. No tracheal deviation present.   Cardiovascular: Normal rate and regular  rhythm.   Pulmonary/Chest: Effort normal. She has no wheezes.   Abdominal: Soft. She exhibits no distension. There is no tenderness. There is no rebound and no guarding.   Musculoskeletal:   Left knee with crepitus and she reports chronic swelling compared to the right. Abrasion right forearm.   Neurological: She is alert. No cranial nerve deficit.   Skin: Skin is warm and dry. She is not diaphoretic.   Psychiatric: She has a normal mood and affect. Her behavior is normal.   Nursing note and vitals reviewed.      Results Review:       I reviewed the patient's new clinical results.     I reviewed imaging, agree with interpretation.     I reviewed telemetry/EKG results, sinus      Results from last 7 days   Lab Units 02/10/20  0403 02/08/20  0840   WBC 10*3/mm3 4.29 7.03   HEMOGLOBIN g/dL 7.3* 9.2*   PLATELETS 10*3/mm3 231 351     Results from last 7 days   Lab Units 02/10/20  0403 02/08/20  0840   SODIUM mmol/L 138 141   POTASSIUM mmol/L 3.2* 4.0   CHLORIDE mmol/L 105 105   CO2 mmol/L 18.3* 20.1*   BUN mg/dL 7* 12   CREATININE mg/dL 0.81 1.13*   GLUCOSE mg/dL 123* 109*   Estimated Creatinine Clearance: 84.7 mL/min (by C-G formula based on SCr of 0.81 mg/dL).  Results from last 7 days   Lab Units 02/10/20  0403 02/08/20  0840   CALCIUM mg/dL 8.3* 8.7   ALBUMIN g/dL  --  4.50   MAGNESIUM mg/dL 1.6 2.7*         amLODIPine 10 mg Oral Daily   aspirin 81 mg Oral Daily   atorvastatin 40 mg Oral Nightly   carvedilol 12.5 mg Oral BID With Meals   DULoxetine 60 mg Oral Daily   famotidine 20 mg Oral BID AC   ferrous sulfate 325 mg Oral Daily With Breakfast   folic acid 1 mg Oral Daily   magnesium oxide 400 mg Oral Daily   melatonin 1 mg Oral Nightly   thiamine 100 mg Oral Daily       sodium chloride 100 mL/hr Last Rate: 100 mL/hr (02/09/20 1036)   Diet Regular; Cardiac    Assessment/Plan     Active Hospital Problems    Diagnosis  POA   • **Alcohol withdrawal syndrome without complication (CMS/HCC) [F10.230]  Yes   • Essential  hypertension [I10]  Yes   • Osteoarthritis of left knee [M17.12]  Yes   • Fall [W19.XXXA]  Yes      Resolved Hospital Problems   No resolved problems to display.       · Alcohol Intoxication/Withdrawal: Continue vitamin supplementation. Minimal Ativan req overnight. Access evaluated.  · Right Elbow Fracture: Chronic fracture with new contusion. Appreciate orthopedic evaluation.  · OA Left Knee: Follow up with home orthopedist.  · Fall  · Anemia: No overt GI losses. Will repeat HH to monitor and check lab workup. Suspicion is from chronic alcohol abuse.  · Dispostion: TBD/1-2 days    Stanislav Gonzalez MD  [facility]  02/10/20  10:18 AM    Dictated portions using Dragon dictation software.    Electronically signed by Stanislav Gonzalez MD at 02/10/20 1024     Stingl, Reyna Carl MD at 20 1657          DAILY PROGRESS NOTE  [facility]    Patient Identification:  Name: Marylin Wilcox  Age: 59 y.o.  Sex: female  :  1960  MRN: [id number]         Primary Care Physician: Provider, No Known    Subjective: patient is feeling better today; splint is off her elbow; no visitors are present now  Interval History: follow up for hypertension, fall, alcohol intoxication, alcohol dependence     Objective:    Scheduled Meds:  amLODIPine 10 mg Oral Daily   aspirin 81 mg Oral Daily   atorvastatin 40 mg Oral Nightly   carvedilol 12.5 mg Oral BID With Meals   DULoxetine 60 mg Oral Daily   famotidine 20 mg Oral BID AC   ferrous sulfate 325 mg Oral Daily With Breakfast   folic acid 1 mg Oral Daily   magnesium oxide 400 mg Oral Daily   melatonin 1 mg Oral Nightly   thiamine 100 mg Oral Daily     Continuous Infusions:  sodium chloride 100 mL/hr Last Rate: 100 mL/hr (20 1036)       Vital signs in last 24 hours:  Temp:  [97.8 °F (36.6 °C)-98.1 °F (36.7 °C)] 97.9 °F (36.6 °C)  Heart Rate:  [87-97] 87  Resp:  [16-20] 18  BP: (143-165)/() 148/90    Intake/Output:    Intake/Output  "Summary (Last 24 hours) at 2/9/2020 1857  Last data filed at 2/9/2020 1855  Gross per 24 hour   Intake 1080 ml   Output 1450 ml   Net -370 ml       Exam:  /90   Pulse 87   Temp 97.9 °F (36.6 °C) (Oral)   Resp 18   Ht 170.2 cm (67\")   Wt 72.6 kg (160 lb)   SpO2 96%   BMI 25.06 kg/m²      General Appearance:    Alert, cooperative, no distress, AAOx3; appears chronically ill and older than stated age                          Head:    Normocephalic, without obvious abnormality, atraumatic                           Eyes:    PERRL, conjunctiva/corneas clear, EOM's intact, both eyes                         Throat:   Lips, tongue, gums normal; oral mucosa pink and moist                           Neck:   Supple, symmetrical, trachea midline, no JVD                         Lungs:    Decreased breath sounds bilaterally, respirations unlabored                 Chest Wall:    No tenderness or deformity                          Heart:    Regular rate and rhythm, S1 and S2 normal, no murmur,no  Rub                                      or gallop                  Abdomen:     Soft, non-tender, bowel sounds active, no masses, no                                                        organomegaly                  Extremities:   Extremities normal, atraumatic, no cyanosis or edema                        Pulses:   Pulses palpable in all extremities                            Skin:   Skin is warm and dry,  no rashes or palpable lesions                  Neurologic:   CNII-XII intact, motor strength grossly intact, sensation grossly                                         intact to light touch, no focal deficits noted       Data Review:  Labs in chart were reviewed.  WBC   Date Value Ref Range Status   02/08/2020 7.03 3.40 - 10.80 10*3/mm3 Final     Hemoglobin   Date Value Ref Range Status   02/08/2020 9.2 (L) 12.0 - 15.9 g/dL Final     Hematocrit   Date Value Ref Range Status   02/08/2020 28.5 (L) 34.0 - 46.6 % Final "     Platelets   Date Value Ref Range Status   02/08/2020 351 140 - 450 10*3/mm3 Final     Sodium   Date Value Ref Range Status   02/08/2020 141 136 - 145 mmol/L Final     Potassium   Date Value Ref Range Status   02/08/2020 4.0 3.5 - 5.2 mmol/L Final     Chloride   Date Value Ref Range Status   02/08/2020 105 98 - 107 mmol/L Final     CO2   Date Value Ref Range Status   02/08/2020 20.1 (L) 22.0 - 29.0 mmol/L Final     BUN   Date Value Ref Range Status   02/08/2020 12 8 - 23 mg/dL Final     Creatinine   Date Value Ref Range Status   02/08/2020 1.13 (H) 0.57 - 1.00 mg/dL Final     Glucose   Date Value Ref Range Status   02/08/2020 109 (H) 65 - 99 mg/dL Final     Calcium   Date Value Ref Range Status   02/08/2020 8.7 8.6 - 10.5 mg/dL Final     Magnesium   Date Value Ref Range Status   02/08/2020 2.7 (H) 1.6 - 2.4 mg/dL Final     AST (SGOT)   Date Value Ref Range Status   02/08/2020 23 1 - 32 U/L Final     ALT (SGPT)   Date Value Ref Range Status   02/08/2020 10 1 - 33 U/L Final     Alkaline Phosphatase   Date Value Ref Range Status   02/08/2020 97 39 - 117 U/L Final     Patient Active Problem List   Diagnosis Code   • Fall W19.XXXA       Assessment:    Fall  alcohol dependence  Alcohol intoxication  anemia  H/o cva  Plan:  Will continue to monitor  Recheck labs in the am  Elbow fracture is not new per ortho and splint is not needed  Recheck labs in am  Pt.ot to see  Unclear if she can return to her independent living  D.w patient and nurse  Strongly advised her to stop drinking alcohol  Access is following  Medium risk    Reyna Cao MD  2/9/2020  6:57 PM      Electronically signed by Reyna Cao MD at 02/09/20 1901     Willie Beal MD at 02/09/20 0905             Orthopedic Consult      Patient: Marylin Wilcox    Date of Admission: 2/8/2020  7:57 AM    YOB: 1960    Medical Record Number: [id number]    Consulting Physician: Soren Cornejo MD    Chief Complaints: Right elbow  injury    History of Present Illness: 59 y.o. female admitted to [facility] to services of Soren Cornejo MD with a right elbow injury.  She was admitted yesterday with alcohol intoxication.  No family members are at the bedside today.  The patient is somnolent but arousable.  She is able to answer questions appropriately.  She tells me that she has had a number of falls in recent months.  She thinks that she may have fallen a couple times last week and injured the elbow during 1 of these falls.  She is not sure.  She admits that she may have injured the elbow in the past as well.  She describes moderate aching pain over the back of the elbow.  She is in a splint and this does help with the discomfort.  Denies any other complaints or injuries.  She had a stroke that affected her left side.  She says that she is left-hand dominant.  She reports good use and function of the right arm prior to this injury.  She does live in independent living facility.    Allergies:   Allergies   Allergen Reactions   • Valsartan Nausea And Vomiting       Home Medications:    Current Facility-Administered Medications:   •  acetaminophen (TYLENOL) tablet 650 mg, 650 mg, Oral, Q6H PRN, Gloria Mathis APRN  •  amLODIPine (NORVASC) tablet 10 mg, 10 mg, Oral, Daily, Gloria Mathis APRN, 10 mg at 02/09/20 0814  •  aspirin EC tablet 81 mg, 81 mg, Oral, Daily, Gloria Mathis APRN, 81 mg at 02/09/20 0814  •  atorvastatin (LIPITOR) tablet 40 mg, 40 mg, Oral, Nightly, Gloria Mathis APRN, 40 mg at 02/08/20 2249  •  carvedilol (COREG) tablet 12.5 mg, 12.5 mg, Oral, BID With Meals, Gloria Mathis APRN, 12.5 mg at 02/09/20 0814  •  DULoxetine (CYMBALTA) DR capsule 60 mg, 60 mg, Oral, Daily, Gloria Mathis APRN, 60 mg at 02/09/20 0814  •  famotidine (PEPCID) tablet 20 mg, 20 mg, Oral, BID AC, Gloria Mathis APRN, 20 mg at 02/09/20 0814  •  ferrous sulfate tablet 325 mg, 325 mg, Oral, Daily  With Breakfast, Gloria Mathis APRN, 325 mg at 02/09/20 0814  •  folic acid (FOLVITE) tablet 1 mg, 1 mg, Oral, Daily, Gloria Mathis APRN, 1 mg at 02/09/20 0814  •  LORazepam (ATIVAN) tablet 1 mg, 1 mg, Oral, Q2H PRN **OR** LORazepam (ATIVAN) injection 1 mg, 1 mg, Intravenous, Q2H PRN **OR** LORazepam (ATIVAN) tablet 2 mg, 2 mg, Oral, Q1H PRN, 2 mg at 02/09/20 0612 **OR** LORazepam (ATIVAN) injection 2 mg, 2 mg, Intravenous, Q1H PRN **OR** LORazepam (ATIVAN) injection 2 mg, 2 mg, Intravenous, Q15 Min PRN **OR** LORazepam (ATIVAN) injection 2 mg, 2 mg, Intramuscular, Q15 Min PRN, Reyna Cao MD  •  magnesium oxide (MAG-OX) tablet 400 mg, 400 mg, Oral, Daily, Gloria Mathis APRN, 400 mg at 02/09/20 0814  •  melatonin tablet 1 mg, 1 mg, Oral, Nightly, Gloria Mathis APRN, 1 mg at 02/08/20 2250  •  nitroglycerin (NITROSTAT) SL tablet 0.4 mg, 0.4 mg, Sublingual, Q5 Min PRN, Gloria Mathis APRN  •  ondansetron (ZOFRAN) tablet 4 mg, 4 mg, Oral, Q6H PRN **OR** ondansetron (ZOFRAN) injection 4 mg, 4 mg, Intravenous, Q6H PRN, Reyna Cao MD  •  ondansetron (ZOFRAN) tablet 4 mg, 4 mg, Oral, Q6H PRN, Gloria Mathis APRN  •  polyethylene glycol (MIRALAX) powder 17 g, 17 g, Oral, Daily PRN, Gloria Mathis APRN  •  [COMPLETED] Insert peripheral IV, , , Once **AND** sodium chloride 0.9 % flush 10 mL, 10 mL, Intravenous, PRN, Gustavo Alvarez MD  •  sodium chloride 0.9 % infusion, 100 mL/hr, Intravenous, Continuous, StinglReyna MD, Last Rate: 100 mL/hr at 02/09/20 0533, 100 mL/hr at 02/09/20 0533  •  thiamine (VITAMIN B-1) tablet 100 mg, 100 mg, Oral, Daily, Gloria Mathis APRN, 100 mg at 02/09/20 0814    Current Medications:  Scheduled Meds:  amLODIPine 10 mg Oral Daily   aspirin 81 mg Oral Daily   atorvastatin 40 mg Oral Nightly   carvedilol 12.5 mg Oral BID With Meals   DULoxetine 60 mg Oral Daily   famotidine 20 mg Oral BID AC   ferrous  sulfate 325 mg Oral Daily With Breakfast   folic acid 1 mg Oral Daily   magnesium oxide 400 mg Oral Daily   melatonin 1 mg Oral Nightly   thiamine 100 mg Oral Daily     Continuous Infusions:  sodium chloride 100 mL/hr Last Rate: 100 mL/hr (20 0533)     PRN Meds:.•  acetaminophen  •  LORazepam **OR** LORazepam **OR** LORazepam **OR** LORazepam **OR** LORazepam **OR** LORazepam  •  nitroglycerin  •  ondansetron **OR** ondansetron  •  ondansetron  •  polyethylene glycol  •  [COMPLETED] Insert peripheral IV **AND** sodium chloride    Past Medical History:   Diagnosis Date   • Alcohol abuse    • Allergic rhinitis    • Anemia    • Crohn's colitis (CMS/HCC)    • Depression    • GERD (gastroesophageal reflux disease)    • GI bleed    • Hepatitis C    • Hernia, diaphragmatic    • Hypertension    • IBS (irritable bowel syndrome)    • Iron deficiency    • Stroke (CMS/HCC)    • Syncope    • Syncope        History reviewed. No pertinent surgical history.    Social History     Occupational History   • Not on file   Tobacco Use   • Smoking status: Former Smoker     Last attempt to quit: 2019     Years since quittin.3   • Smokeless tobacco: Never Used   Substance and Sexual Activity   • Alcohol use: Yes     Alcohol/week: 14.0 standard drinks     Types: 14 Shots of liquor per week     Comment: 2 DRINKS/DAY SINCE NOV. GRADUALLY INCREASING   • Drug use: Not Currently   • Sexual activity: Defer    Social History     Social History Narrative   • Not on file       History reviewed. No pertinent family history.    Review of Systems:     Constitutional:  Denies fever, shaking or chills   Eyes:  Denies change in visual acuity   HEENT:  Denies nasal congestion or sore throat   Respiratory:  Denies cough or shortness of breath   Cardiovascular:  Denies chest pain or edema  Endocrine: Denies tremors, palpitations, intolerance of heat or cold, polyuria, polydipsia.  GI:  Denies abdominal pain, nausea, vomiting, bloody stools or  diarrhea  :  Denies frequency, urgency, incontinence, retention, or nocturia.  Musculoskeletal:  Denies numbness tingling or loss of motor function except as above  Integument:  Denies rash, lesion or ulceration   Neurologic:  Denies headache or focal weakness, deficits  Heme:  Denies epistaxis, spontaneous or excessive bleeding, hematuria, melena, fatigue, enlarged or tender lymph nodes.      All other pertinent positives and negatives as noted above in HPI.    Physical Exam: 59 y.o. female    Vitals:    02/08/20 1955 02/08/20 2248 02/08/20 2318 02/09/20 0737   BP: 148/85 148/85 143/91 (!) 165/101   BP Location: Left arm  Left arm Left arm   Patient Position: Lying  Lying Lying   Pulse: 97 95 90 93   Resp: 20 18 16   Temp:   98.1 °F (36.7 °C) 97.8 °F (36.6 °C)   TempSrc:   Oral Oral   SpO2: 95%  94%    Weight:       Height:         General:  Awake, alert. No acute distress.  Somnolent but arousable.  She is able to follow commands and answer questions.    Head/Neck:  Normocephalic, atraumatic.  Conjunctiva and sclera clear.  Hearing adequate for the exam.  Neck is supple with normal ROM.    Psych:  Affect and demeanor appropriate.  Otherwise, as above    CV:  Regular rate and rhythm.  Hemodynamically stable.    Lungs:  Good chest expansion, breathing unlabored.    Abdomen:  Soft.  Non-tender, non-distended.    Extremities: Right elbow: She had a splint in place that I removed.  Skin appears benign.  She has ecchymosis over the back of the elbow and a shallow abrasion.  Compartments soft without evidence for DVT or compartment syndrome.  No atrophy.  No palpable masses or adenopathy.  She has only mild tenderness at the elbow.  Her extensor mechanism has a defect at the olecranon process and there are palpable bony fragments extending up along the back of the humerus.  This is completely nontender.  She is only tender in the area of ecchymosis just over the olecranon itself and into the forearm.  Again, this is  mild.  She can nearly fully range the elbow without significant discomfort.  She can actively extend although it is weak relative to the other side.  Strength well-preserved distally with wrist flexion and extension,  and pinch.  Sensation to light touch grossly intact distally.  Good skin turgor, brisk cap refill and good pulses distally.    All other extremities atraumatic without gross abnormality.     Diagnostic Tests:    Admission on 02/08/2020   Component Date Value Ref Range Status   • Glucose 02/08/2020 109* 65 - 99 mg/dL Final   • BUN 02/08/2020 12  8 - 23 mg/dL Final   • Creatinine 02/08/2020 1.13* 0.57 - 1.00 mg/dL Final   • Sodium 02/08/2020 141  136 - 145 mmol/L Final   • Potassium 02/08/2020 4.0  3.5 - 5.2 mmol/L Final   • Chloride 02/08/2020 105  98 - 107 mmol/L Final   • CO2 02/08/2020 20.1* 22.0 - 29.0 mmol/L Final   • Calcium 02/08/2020 8.7  8.6 - 10.5 mg/dL Final   • Total Protein 02/08/2020 7.6  6.0 - 8.5 g/dL Final   • Albumin 02/08/2020 4.50  3.50 - 5.20 g/dL Final   • ALT (SGPT) 02/08/2020 10  1 - 33 U/L Final   • AST (SGOT) 02/08/2020 23  1 - 32 U/L Final   • Alkaline Phosphatase 02/08/2020 97  39 - 117 U/L Final   • Total Bilirubin 02/08/2020 0.2  0.2 - 1.2 mg/dL Final   • eGFR Non African Amer 02/08/2020 49* >60 mL/min/1.73 Final   • Globulin 02/08/2020 3.1  gm/dL Final   • A/G Ratio 02/08/2020 1.5  g/dL Final   • BUN/Creatinine Ratio 02/08/2020 10.6  7.0 - 25.0 Final   • Anion Gap 02/08/2020 15.9* 5.0 - 15.0 mmol/L Final   • Protime 02/08/2020 15.2* 11.7 - 14.2 Seconds Final   • INR 02/08/2020 1.23* 0.90 - 1.10 Final   • Troponin T 02/08/2020 <0.010  0.000 - 0.030 ng/mL Final   • Color, UA 02/08/2020 Yellow  Yellow, Straw Final   • Appearance, UA 02/08/2020 Clear  Clear Final   • pH, UA 02/08/2020 5.5  5.0 - 8.0 Final   • Specific Gravity, UA 02/08/2020 1.010  1.005 - 1.030 Final   • Glucose, UA 02/08/2020 Negative  Negative Final   • Ketones, UA 02/08/2020 Negative  Negative Final   •  Bilirubin, UA 02/08/2020 Negative  Negative Final   • Blood, UA 02/08/2020 Negative  Negative Final   • Protein, UA 02/08/2020 Negative  Negative Final   • Leuk Esterase, UA 02/08/2020 Negative  Negative Final   • Nitrite, UA 02/08/2020 Negative  Negative Final   • Urobilinogen, UA 02/08/2020 0.2 E.U./dL  0.2 - 1.0 E.U./dL Final   • Magnesium 02/08/2020 2.7* 1.6 - 2.4 mg/dL Final   • Acetaminophen 02/08/2020 <5.0* 10.0 - 30.0 mcg/mL Final   • Ethanol 02/08/2020 414* 0 - 10 mg/dL Final   • Ethanol % 02/08/2020 0.414  % Final   • Amphet/Methamphet, Screen 02/08/2020 Negative  Negative Final   • Barbiturates Screen, Urine 02/08/2020 Negative  Negative Final   • Benzodiazepine Screen, Urine 02/08/2020 Negative  Negative Final   • Cocaine Screen, Urine 02/08/2020 Negative  Negative Final   • Opiate Screen 02/08/2020 Negative  Negative Final   • THC, Screen, Urine 02/08/2020 Negative  Negative Final   • Methadone Screen, Urine 02/08/2020 Negative  Negative Final   • Oxycodone Screen, Urine 02/08/2020 Negative  Negative Final   • Salicylate 02/08/2020 0.3  <=30.0 mg/dL Final   • WBC 02/08/2020 7.03  3.40 - 10.80 10*3/mm3 Final   • RBC 02/08/2020 2.94* 3.77 - 5.28 10*6/mm3 Final   • Hemoglobin 02/08/2020 9.2* 12.0 - 15.9 g/dL Final   • Hematocrit 02/08/2020 28.5* 34.0 - 46.6 % Final   • MCV 02/08/2020 96.9  79.0 - 97.0 fL Final   • MCH 02/08/2020 31.3  26.6 - 33.0 pg Final   • MCHC 02/08/2020 32.3  31.5 - 35.7 g/dL Final   • RDW 02/08/2020 15.3  12.3 - 15.4 % Final   • RDW-SD 02/08/2020 53.1  37.0 - 54.0 fl Final   • MPV 02/08/2020 8.5  6.0 - 12.0 fL Final   • Platelets 02/08/2020 351  140 - 450 10*3/mm3 Final   • Neutrophil % 02/08/2020 57.8  42.7 - 76.0 % Final   • Lymphocyte % 02/08/2020 30.7  19.6 - 45.3 % Final   • Monocyte % 02/08/2020 7.4  5.0 - 12.0 % Final   • Eosinophil % 02/08/2020 2.1  0.3 - 6.2 % Final   • Basophil % 02/08/2020 1.7* 0.0 - 1.5 % Final   • Immature Grans % 02/08/2020 0.3  0.0 - 0.5 % Final   •  Neutrophils, Absolute 02/08/2020 4.06  1.70 - 7.00 10*3/mm3 Final   • Lymphocytes, Absolute 02/08/2020 2.16  0.70 - 3.10 10*3/mm3 Final   • Monocytes, Absolute 02/08/2020 0.52  0.10 - 0.90 10*3/mm3 Final   • Eosinophils, Absolute 02/08/2020 0.15  0.00 - 0.40 10*3/mm3 Final   • Basophils, Absolute 02/08/2020 0.12  0.00 - 0.20 10*3/mm3 Final   • Immature Grans, Absolute 02/08/2020 0.02  0.00 - 0.05 10*3/mm3 Final   • nRBC 02/08/2020 0.0  0.0 - 0.2 /100 WBC Final   • Creatine Kinase 02/08/2020 238* 20 - 180 U/L Final   • Glucose 02/08/2020 111  70 - 130 mg/dL Final     Lab Results (last 24 hours)     Procedure Component Value Units Date/Time    Urine Drug Screen - Urine, Clean Catch [[id number]]  (Normal) Collected:  02/08/20 0942    Specimen:  Urine, Clean Catch Updated:  02/08/20 1021     Amphet/Methamphet, Screen Negative     Barbiturates Screen, Urine Negative     Benzodiazepine Screen, Urine Negative     Cocaine Screen, Urine Negative     Opiate Screen Negative     THC, Screen, Urine Negative     Methadone Screen, Urine Negative     Oxycodone Screen, Urine Negative    Narrative:       Negative Thresholds For Drugs Screened:     Amphetamines               500 ng/ml   Barbiturates               200 ng/ml   Benzodiazepines            100 ng/ml   Cocaine                    300 ng/ml   Methadone                  300 ng/ml   Opiates                    300 ng/ml   Oxycodone                  100 ng/ml   THC                        50 ng/ml    The Normal Value for all drugs tested is negative. This report includes final unconfirmed screening results to be used for medical treatment purposes only. Unconfirmed results must not be used for non-medical purposes such as employment or legal testing. Clinical consideration should be applied to any drug of abuse test, particulary when unconfirmed results are used.    Urinalysis With Culture If Indicated - Urine, Catheter [[id number]]  (Normal) Collected:  02/08/20 0942    Specimen:   Urine, Catheter Updated:  02/08/20 1001     Color, UA Yellow     Appearance, UA Clear     pH, UA 5.5     Specific Gravity, UA 1.010     Glucose, UA Negative     Ketones, UA Negative     Bilirubin, UA Negative     Blood, UA Negative     Protein, UA Negative     Leuk Esterase, UA Negative     Nitrite, UA Negative     Urobilinogen, UA 0.2 E.U./dL    Narrative:       Urine microscopic not indicated.    Protime-INR [[id number]]  (Abnormal) Collected:  02/08/20 0840    Specimen:  Blood Updated:  02/08/20 0912     Protime 15.2 Seconds      INR 1.23    Comprehensive Metabolic Panel [[id number]]  (Abnormal) Collected:  02/08/20 0840    Specimen:  Blood Updated:  02/08/20 0910     Glucose 109 mg/dL      BUN 12 mg/dL      Creatinine 1.13 mg/dL      Sodium 141 mmol/L      Potassium 4.0 mmol/L      Chloride 105 mmol/L      CO2 20.1 mmol/L      Calcium 8.7 mg/dL      Total Protein 7.6 g/dL      Albumin 4.50 g/dL      ALT (SGPT) 10 U/L      AST (SGOT) 23 U/L      Alkaline Phosphatase 97 U/L      Total Bilirubin 0.2 mg/dL      eGFR Non African Amer 49 mL/min/1.73      Globulin 3.1 gm/dL      A/G Ratio 1.5 g/dL      BUN/Creatinine Ratio 10.6     Anion Gap 15.9 mmol/L     Narrative:       GFR Normal >60  Chronic Kidney Disease <60  Kidney Failure <15      Troponin [[id number]]  (Normal) Collected:  02/08/20 0840    Specimen:  Blood Updated:  02/08/20 0910     Troponin T <0.010 ng/mL     Narrative:       Troponin T Reference Range:  <= 0.03 ng/mL-   Negative for AMI  >0.03 ng/mL-     Abnormal for myocardial necrosis.  Clinicians would have to utilize clinical acumen, EKG, Troponin and serial changes to determine if it is an Acute Myocardial Infarction or myocardial injury due to an underlying chronic condition.       Results may be falsely decreased if patient taking Biotin.      Acetaminophen Level [[id number]]  (Abnormal) Collected:  02/08/20 0840    Specimen:  Blood Updated:  02/08/20 0910     Acetaminophen <5.0 mcg/mL     Ethanol  [[id number]]  (Abnormal) Collected:  02/08/20 0840    Specimen:  Blood Updated:  02/08/20 0910     Ethanol 414 mg/dL      Ethanol % 0.414 %     Salicylate Level [[id number]]  (Normal) Collected:  02/08/20 0840    Specimen:  Blood Updated:  02/08/20 0910     Salicylate 0.3 mg/dL     Narrative:       Therapeutic range for Salicylates:  3.0 - 10.0 mg/dL for antipyretic/analgesic conditions  15.0 - 30.0 mg/dL for anti-inflammatory conditions    CK [[id number]]  (Abnormal) Collected:  02/08/20 0840    Specimen:  Blood Updated:  02/08/20 0910     Creatine Kinase 238 U/L     Magnesium [[id number]]  (Abnormal) Collected:  02/08/20 0840    Specimen:  Blood Updated:  02/08/20 0910     Magnesium 2.7 mg/dL         Imaging: AP and lateral views of the right elbow are reviewed on the [employer] system along with the associated report.  CT scan of the elbow is also reviewed along with the report.  The x-rays show a comminuted, displaced olecranon fracture.  There is rounding of the end of the olecranon and sclerosis around the margins of the displaced olecranon fragment consistent with a chronic fracture/nonunion.  The CT scan confirms the same.  She has a small effusion or hemarthrosis but no other acute abnormalities.    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  There is no telling how long this has been there.  Her pain is fairly minimal and there is no need for immobilization.  I recommend relative rest, icing and anti-inflammatories for her discomfort.  The pain should slowly get better.  She can follow-up as an outpatient.  Thank you for the consultation.  Please call with any questions or concerns.    Date: 2/9/2020    Willie Beal MD    CC: Soren Cornejo MD        Electronically signed by Willie Beal MD at 02/09/20 0914          Consult Notes      Ángel Mirza, JI at 02/09/20 1111      Consult Orders    1. Inpatient Access  "Center Consult [[id number]] ordered by Reyna Cao MD at 02/08/20 6857              [facility] evaluated pt. Pt alert and able to answer questions. Pt states she has had several falls but did not explain them. Pt vague in her answers. Pt states she drinks \"two drinks a day\". When asked how she had a BAL of 414, pt states they were \"celebrating a birthday\" but was not clear on the details. Pt states she has had past tx for alcohol abuse. Pt states she was in an in[place] place [place]rs [place] Pt states she then spent two yrs in outpt tx at the [facility]. This was a few yrs back according to pt. Pt not able to remember dates at this time. Pt states she went with one of her sisters who was struggling with prescription drug issues. Pt states they went to  together for a year until last Sept 2019. Pt states she had a sponsor at that time but no longer. Pt denies any seizures or hallucinations with past detox.     Pt denies any SI past or present. Pt rates her anxiety as a \"9\" and her depressed mood as a \"0\". Pt states she has taken Cymbalta 60mg for a while. Pt states she moved to the assisted living place two months ago. Pt states she previously lived with her sister. Pt states she has 6 sisters who are supportive. Pt states her sleep has been poor lately but appetite is good as she eats three meals a day at the facility. Pt has had strokes in the past. Pt states she had to quit her job as  due to a fall and shoulder surgery a year ago. Pt states she is in the process of applying for disability.     Pt is  two times with the last  having tried to \"slit my throat\". Pt has a 28 yr old son who lives in [place]. Pt states they keep in touch and he is her POA. Access talked with pt about follow up care. Access asked pt about returning to IOP and outpt mtgs at the [facility] as she felt it was helpful in the past. Pt noncommittal and states she " doesn't drive. Access gave pt information sheet on transportation services thru Medicaid.     Access will follow.    Electronically signed by Ángel Mirza LCSW at 02/09/20 2397

## 2020-02-11 LAB
ANION GAP SERPL CALCULATED.3IONS-SCNC: 15.5 MMOL/L (ref 5–15)
BUN BLD-MCNC: 4 MG/DL (ref 8–23)
BUN/CREAT SERPL: 5.1 (ref 7–25)
CALCIUM SPEC-SCNC: 8.8 MG/DL (ref 8.6–10.5)
CHLORIDE SERPL-SCNC: 102 MMOL/L (ref 98–107)
CO2 SERPL-SCNC: 17.5 MMOL/L (ref 22–29)
CREAT BLD-MCNC: 0.78 MG/DL (ref 0.57–1)
FOLATE SERPL-MCNC: >20 NG/ML (ref 4.78–24.2)
GFR SERPL CREATININE-BSD FRML MDRD: 75 ML/MIN/1.73
GLUCOSE BLD-MCNC: 103 MG/DL (ref 65–99)
HCT VFR BLD AUTO: 26.4 % (ref 34–46.6)
HEMOCCULT STL QL: POSITIVE
HGB BLD-MCNC: 8.5 G/DL (ref 12–15.9)
MAGNESIUM SERPL-MCNC: 1.6 MG/DL (ref 1.6–2.4)
POTASSIUM BLD-SCNC: 4.1 MMOL/L (ref 3.5–5.2)
SODIUM BLD-SCNC: 135 MMOL/L (ref 136–145)
VIT B12 BLD-MCNC: 773 PG/ML (ref 211–946)

## 2020-02-11 PROCEDURE — 97110 THERAPEUTIC EXERCISES: CPT

## 2020-02-11 PROCEDURE — 85014 HEMATOCRIT: CPT | Performed by: INTERNAL MEDICINE

## 2020-02-11 PROCEDURE — 99254 IP/OBS CNSLTJ NEW/EST MOD 60: CPT | Performed by: INTERNAL MEDICINE

## 2020-02-11 PROCEDURE — 82272 OCCULT BLD FECES 1-3 TESTS: CPT | Performed by: INTERNAL MEDICINE

## 2020-02-11 PROCEDURE — 82746 ASSAY OF FOLIC ACID SERUM: CPT | Performed by: INTERNAL MEDICINE

## 2020-02-11 PROCEDURE — 85018 HEMOGLOBIN: CPT | Performed by: INTERNAL MEDICINE

## 2020-02-11 PROCEDURE — 82607 VITAMIN B-12: CPT | Performed by: INTERNAL MEDICINE

## 2020-02-11 RX ADMIN — FAMOTIDINE 20 MG: 20 TABLET, FILM COATED ORAL at 17:01

## 2020-02-11 RX ADMIN — CARVEDILOL 12.5 MG: 12.5 TABLET, FILM COATED ORAL at 09:21

## 2020-02-11 RX ADMIN — ATORVASTATIN CALCIUM 40 MG: 20 TABLET, FILM COATED ORAL at 21:12

## 2020-02-11 RX ADMIN — AMLODIPINE BESYLATE 10 MG: 10 TABLET ORAL at 09:21

## 2020-02-11 RX ADMIN — FOLIC ACID 1 MG: 1 TABLET ORAL at 09:21

## 2020-02-11 RX ADMIN — Medication 100 MG: at 09:21

## 2020-02-11 RX ADMIN — CARVEDILOL 12.5 MG: 12.5 TABLET, FILM COATED ORAL at 17:01

## 2020-02-11 RX ADMIN — Medication 400 MG: at 09:21

## 2020-02-11 RX ADMIN — SODIUM CHLORIDE 100 ML/HR: 9 INJECTION, SOLUTION INTRAVENOUS at 11:37

## 2020-02-11 RX ADMIN — ACETAMINOPHEN 650 MG: 325 TABLET, FILM COATED ORAL at 16:00

## 2020-02-11 RX ADMIN — FERROUS SULFATE TAB 325 MG (65 MG ELEMENTAL FE) 325 MG: 325 (65 FE) TAB at 17:01

## 2020-02-11 RX ADMIN — Medication 1 MG: at 21:12

## 2020-02-11 RX ADMIN — FAMOTIDINE 20 MG: 20 TABLET, FILM COATED ORAL at 06:54

## 2020-02-11 RX ADMIN — DULOXETINE HYDROCHLORIDE 60 MG: 60 CAPSULE, DELAYED RELEASE ORAL at 09:21

## 2020-02-11 RX ADMIN — ASPIRIN 81 MG: 81 TABLET, COATED ORAL at 09:21

## 2020-02-11 NOTE — NURSING NOTE
[facility] follow-up.  Denies ETOH withdrawal symptoms, CIWA low at 2 and 1, denies ETOH cravings.  Denies SI, reports feelings of depression related to having to live at a senior living facility.  Interested in therapist, facility she lives at will take her to appointments, resources were provided.    AC following.

## 2020-02-11 NOTE — PROGRESS NOTES
Name: Marylin Wilcox ADMIT: 2020   : 1960  PCP: Provider, No Known    MRN: [id number] LOS: 1 days   AGE/SEX: 59 y.o. female  ROOM: [facility]/   Subjective   Chief Complaint   Patient presents with   • Alcohol Intoxication   • Fall     unwitnessed      No withdrawal symptoms. No abdominal pain or swelling. Pain improved overall. No CP SOA NVD.    Objective   Vital Signs  Temp:  [98.2 °F (36.8 °C)-99.7 °F (37.6 °C)] 99.7 °F (37.6 °C)  Heart Rate:  [75-92] 78  Resp:  [16-18] 16  BP: (130-150)/(86-95) 136/91  SpO2:  [96 %-99 %] 96 %  on   ;   Device (Oxygen Therapy): room air  Body mass index is 25.06 kg/m².    Physical Exam   Constitutional: She appears well-developed. No distress.   frail   HENT:   Head: Atraumatic.   Nose: Nose normal.   Eyes: Conjunctivae and EOM are normal.   Neck: Neck supple. No tracheal deviation present.   Cardiovascular: Normal rate and regular rhythm.   Pulmonary/Chest: Effort normal. She has no wheezes.   Abdominal: Soft. She exhibits no distension. There is no tenderness. There is no rebound and no guarding.   Musculoskeletal:   Left knee with crepitus and she reports chronic swelling compared to the right. Abrasion right forearm.   Neurological: She is alert. No cranial nerve deficit.   Skin: Skin is warm and dry. She is not diaphoretic.   Psychiatric: She has a normal mood and affect. Her behavior is normal.   Nursing note and vitals reviewed.      Results Review:       I reviewed the patient's new clinical results.        Results from last 7 days   Lab Units 20  1031 02/10/20  1749 02/10/20  0403 20  0840   WBC 10*3/mm3  --   --  4.29 7.03   HEMOGLOBIN g/dL 8.5* 7.9* 7.3* 9.2*   PLATELETS 10*3/mm3  --   --  231 351     Results from last 7 days   Lab Units 02/10/20  2212 02/10/20  0403 20  0840   SODIUM mmol/L 135* 138 141   POTASSIUM mmol/L 4.1  4.0 3.2* 4.0   CHLORIDE mmol/L 102 105 105   CO2 mmol/L 17.5* 18.3* 20.1*   BUN mg/dL 4* 7* 12   CREATININE mg/dL  0.78 0.81 1.13*   GLUCOSE mg/dL 103* 123* 109*   Estimated Creatinine Clearance: 87.9 mL/min (by C-G formula based on SCr of 0.78 mg/dL).  Results from last 7 days   Lab Units 02/10/20  2212 02/10/20  0403 02/08/20  0840   CALCIUM mg/dL 8.8 8.3* 8.7   ALBUMIN g/dL  --   --  4.50   MAGNESIUM mg/dL 1.6 1.6 2.7*         amLODIPine 10 mg Oral Daily   aspirin 81 mg Oral Daily   atorvastatin 40 mg Oral Nightly   carvedilol 12.5 mg Oral BID With Meals   DULoxetine 60 mg Oral Daily   famotidine 20 mg Oral BID AC   ferrous sulfate 325 mg Oral Daily With Breakfast   folic acid 1 mg Oral Daily   magnesium oxide 400 mg Oral Daily   melatonin 1 mg Oral Nightly   thiamine 100 mg Oral Daily       sodium chloride 100 mL/hr Last Rate: 100 mL/hr (02/11/20 1137)   Diet Regular; Cardiac    Assessment/Plan      Active Hospital Problems    Diagnosis  POA   • **Alcohol withdrawal syndrome without complication (CMS/HCC) [F10.230]  Yes   • Essential hypertension [I10]  Yes   • Osteoarthritis of left knee [M17.12]  Yes   • Alcohol withdrawal (CMS/HCC) [F10.239]  Yes   • Fall [W19.XXXA]  Yes      Resolved Hospital Problems   No resolved problems to display.       · Alcohol Intoxication/Withdrawal: Continue vitamin supplementation. NO acute withdrawal now. Access evaluated.  · Right Elbow Fracture: Chronic fracture with new contusion. Appreciate orthopedic evaluation.  · OA Left Knee: Follow up with home orthopedist.  · Fall  · Anemia: JON based on iron sat. FOBT was positive. No overt GI losses and hgb stable. Continue iron supplementation and will ask GI to consult  · Dispostion: Return to [facility], possibly tomorrow    Stanislav Gonzalez MD  [facility]  02/11/20  12:13 PM    Dictated portions using Dragon dictation software.

## 2020-02-11 NOTE — PLAN OF CARE
Problem: Patient Care Overview  Goal: Plan of Care Review  Flowsheets (Taken 2/11/2020 1631)  Progress: improving  Plan of Care Reviewed With: patient  Outcome Summary: Pt doing well today and agreeable to PT . PT demonstrating improvement with activity and able to ambulate over 150 ft without using AD. No LOB or SOA noted with activity. Will continue to progress as tolerated.

## 2020-02-11 NOTE — PLAN OF CARE
Problem: Patient Care Overview  Goal: Plan of Care Review  Outcome: Ongoing (interventions implemented as appropriate)  Flowsheets (Taken 2/10/2020 1615)  Progress: improving  Plan of Care Reviewed With: patient; family  Outcome Summary: Pt. A&O x 4; CIWA protocol with current score of 0. High fall risk, bed alarm in use.  Patient educated on importance of calling out for assistance to bedside commode.  No complaints of pain.  Vitals stable.  Will continue to monitor.     Problem: Fall Risk (Adult)  Goal: Identify Related Risk Factors and Signs and Symptoms  Outcome: Ongoing (interventions implemented as appropriate)     Problem: Skin Injury Risk (Adult)  Goal: Identify Related Risk Factors and Signs and Symptoms  Outcome: Ongoing (interventions implemented as appropriate)

## 2020-02-11 NOTE — PROGRESS NOTES
Continued Stay Note  [facility]     Patient Name: Marylin Wilcox  MRN: [id number]  Today's Date: 2/11/2020    Admit Date: 2/8/2020    Discharge Plan     Row Name 02/11/20 1435       Plan    Plan  Return to [facility] assisted living via family transport, Needs written prescriptions for any new medications    Provided post acute provider list?  N/A    Patient/Family in Agreement with Plan  yes pt and family    Row Name 02/11/20 1412       Plan    Plan  Return to [facility] via sister transport    Plan Comments  Facesheet information was verified.  Patient lives in personal care at [facility] assisted living.  She has no DME.  She denies rehab needs and is IADLs per her report.  She plans to return to [facility].  Spoke with Jw at [facility] and he accepts back.  Her sister will transport.  Denies other needs..........................Sherice Abbott RN         Discharge Codes    No documentation.             Sherice Abbott RN

## 2020-02-11 NOTE — THERAPY TREATMENT NOTE
Patient Name: Marylin Wilcox  : 1960    MRN: [id number]                              Today's Date: 2020       Admit Date: 2020    Visit Dx:     ICD-10-CM ICD-9-CM   1. Fall, initial encounter W19.XXXA E888.9   2. Acute alcoholic intoxication in alcoholism with complication (CMS/HCC) F10.229 303.00   3. Elbow fracture, right, closed, initial encounter S42.401A 812.40   4. Left hemiparesis (CMS/HCC) G81.94 342.90     Patient Active Problem List   Diagnosis   • Fall   • Alcohol withdrawal syndrome without complication (CMS/HCC)   • Essential hypertension   • Osteoarthritis of left knee   • Alcohol withdrawal (CMS/HCC)     Past Medical History:   Diagnosis Date   • Alcohol abuse    • Allergic rhinitis    • Anemia    • Crohn's colitis (CMS/HCC)    • Depression    • GERD (gastroesophageal reflux disease)    • GI bleed    • Hepatitis C    • Hernia, diaphragmatic    • Hypertension    • IBS (irritable bowel syndrome)    • Iron deficiency    • Stroke (CMS/HCC)    • Syncope    • Syncope      History reviewed. No pertinent surgical history.  General Information     Row Name 20 1630          PT Evaluation Time/Intention    Document Type  therapy note (daily note)  -EJ     Mode of Treatment  physical therapy  -       User Key  (r) = Recorded By, (t) = Taken By, (c) = Cosigned By    Initials Name Provider Type    EJ Ana Lilia Donovan, PT Physical Therapist        Mobility     Row Name 20 1630          Bed Mobility Assessment/Treatment    Bed Mobility Assessment/Treatment  bed mobility (all) activities  -     Baxter Level (Bed Mobility)  supervision  -EJ     Row Name 20 163          Sit-Stand Transfer    Sit-Stand Baxter (Transfers)  verbal cues;stand by assist;contact guard  -     Row Name 20 163          Gait/Stairs Assessment/Training    Gait/Stairs Assessment/Training  gait/ambulation independence  -EJ     Baxter Level (Gait)  verbal cues;contact guard  -EJ      Assistive Device (Gait)  -- HHA  -EJ     Distance in Feet (Gait)  150  -EJ     Deviations/Abnormal Patterns (Gait)  renetta decreased  -EJ     Bilateral Gait Deviations  forward flexed posture  -EJ     Comment (Gait/Stairs)  no LOB Noted  -EJ       User Key  (r) = Recorded By, (t) = Taken By, (c) = Cosigned By    Initials Name Provider Type    Ana Lilia Martin, PT Physical Therapist        Obj/Interventions    No documentation.       Goals/Plan    No documentation.       Clinical Impression     Row Name 02/11/20 1631          Pain Scale: Numbers Pre/Post-Treatment    Pain Scale: Numbers, Pretreatment  0/10 - no pain  -EJ     Pain Scale: Numbers, Post-Treatment  0/10 - no pain  -EJ     Row Name 02/11/20 1631          Plan of Care Review    Plan of Care Reviewed With  patient  -EJ     Progress  improving  -EJ     Outcome Summary  Pt doing well today and agreeable to PT . PT demonstrating improvement with activity and able to ambulate over 150 ft without using AD. No LOB or SOA noted with activity. Will continue to progress as tolerated.  -EJ     Row Name 02/11/20 1631          Vital Signs    O2 Delivery Post Treatment  room air  -EJ     Row Name 02/11/20 1631          Positioning and Restraints    Pre-Treatment Position  in bed  -EJ     Post Treatment Position  bed  -EJ     In Bed  notified nsg;supine;call light within reach;encouraged to call for assist;exit alarm on  -EJ       User Key  (r) = Recorded By, (t) = Taken By, (c) = Cosigned By    Initials Name Provider Type    Ana Lilia Martin, PT Physical Therapist        Outcome Measures     Row Name 02/11/20 1632          How much help from another person do you currently need...    Turning from your back to your side while in flat bed without using bedrails?  4  -EJ     Moving from lying on back to sitting on the side of a flat bed without bedrails?  4  -EJ     Moving to and from a bed to a chair (including a wheelchair)?  3  -EJ     Standing up from a  chair using your arms (e.g., wheelchair, bedside chair)?  3  -EJ     Climbing 3-5 steps with a railing?  3  -EJ     To walk in hospital room?  3  -EJ     AM-PAC 6 Clicks Score (PT)  20  -EJ       User Key  (r) = Recorded By, (t) = Taken By, (c) = Cosigned By    Initials Name Provider Type    Ana Lilia Martin, PT Physical Therapist        Physical Therapy Education                 Title: PT OT SLP Therapies (In Progress)     Topic: Physical Therapy (In Progress)     Point: Mobility training (Done)     Description:   Instruct learner(s) on safety and technique for assisting patient out of bed, chair or wheelchair.  Instruct in the proper use of assistive devices, such as walker, crutches, cane or brace.              Patient Friendly Description:   It's important to get you on your feet again, but we need to do so in a way that is safe for you. Falling has serious consequences, and your personal safety is the most important thing of all.        When it's time to get out of bed, one of us or a family member will sit next to you on the bed to give you support.     If your doctor or nurse tells you to use a walker, crutches, a cane, or a brace, be sure you use it every time you get out of bed, even if you think you don't need it.    Learning Progress Summary           Patient Acceptance, E,TB,D, VU by ZHANG at 2/11/2020 1632    Acceptance, E, NR by AR at 2/10/2020 0844                   Point: Home exercise program (In Progress)     Description:   Instruct learner(s) on appropriate technique for monitoring, assisting and/or progressing patient with therapeutic exercises and activities.              Learning Progress Summary           Patient Acceptance, E, NR by AR at 2/10/2020 0844                   Point: Body mechanics (In Progress)     Description:   Instruct learner(s) on proper positioning and spine alignment for patient and/or caregiver during mobility tasks and/or exercises.              Learning Progress  Summary           Patient Acceptance, E, NR by AR at 2/10/2020 0844                   Point: Precautions (In Progress)     Description:   Instruct learner(s) on prescribed precautions during mobility and gait tasks              Learning Progress Summary           Patient Acceptance, E, NR by AR at 2/10/2020 0844                               User Key     Initials Effective Dates Name Provider Type [facility] 04/03/18 -  Ana Lilia Donovan, PT Physical Therapist PT    AR 04/03/18 -  Casandra Reich PT Physical Therapist PT              PT Recommendation and Plan     Plan of Care Reviewed With: patient  Progress: improving  Outcome Summary: Pt doing well today and agreeable to PT . PT demonstrating improvement with activity and able to ambulate over 150 ft without using AD. No LOB or SOA noted with activity. Will continue to progress as tolerated.     Time Calculation:   PT Charges     Row Name 02/11/20 1632             Time Calculation    Start Time  1620  -EJ      Stop Time  1630  -EJ      Time Calculation (min)  10 min  -EJ      PT Received On  02/11/20  -EJ      PT - Next Appointment  02/12/20  -EJ        User Key  (r) = Recorded By, (t) = Taken By, (c) = Cosigned By    Initials Name Provider Type     Ana Lilia Donovan, PT Physical Therapist        Therapy Charges for Today     Code Description Service Date Service Provider Modifiers Qty    88848580819 HC PT THER PROC EA 15 MIN 2/11/2020 Ana Lilia Donovan, PT GP 1          PT G-Codes  Outcome Measure Options: AM-PAC 6 Clicks Daily Activity (OT)  AM-PAC 6 Clicks Score (PT): 20  AM-PAC 6 Clicks Score (OT): 19    Ana Lilia Donovan PT  2/11/2020

## 2020-02-11 NOTE — CONSULTS
[facility], operated by [facility]  Initial Inpatient Consult Note    Referring Provider: A    Reason for Consultation: Anemia, heme positive stool    Subjective     History of present illness:    59 y.o. female, obesity followed by Dr. Francisco Lopez, that we are asked to see for anemia and heme positive stool.  Patient was admitted after a series of falls.  Patient has known history of alcohol abuse.  She was found down with a bottle of vodka and cannot recall what happened.  She was noted to have anemia with hemoglobin in the eights.  The anemia is normocytic.  Ferritin is normal.  Her iron and iron saturation were low.  She was noted to be heme positive.  She is on an outpatient aspirin.  No blood thinners.    Patient has had a previous endoscopic evaluation for anemia in 2017.  EGD was notable for portal hypertensive gastropathy.  Colonoscopy was normal to the terminal ileum.  She had a small bowel capsule endoscopy thereafter that was also normal.    She had a liver biopsy in 2004 which showed bridging fibrosis.  Patient has a history of hepatitis C as well as alcohol abuse.  Patient reports that she was treated for hepatitis C in the past and it has been eradicated.  She denies any knowledge of cirrhosis.  She reports that she has not followed up with anybody recently for her liver.    She denies any abdominal pain at present.  She is not seen any blood in her stool recently that she knows of but reports that at her rehab facility her stool was tested and was positive for blood.  She does report that she was having fairly pronounced rectal bleeding in the fall around the time of of her stroke.  Bowel movements have been normal.  She has no nausea or vomiting.  Her weight is been stable and her appetite is been good.  She denies heartburn or dysphasia.    Records were reviewed from [facility] in care everywhere.    Past Medical History:  Past Medical History:   Diagnosis Date   • Alcohol abuse    •  Allergic rhinitis    • Anemia    • Crohn's colitis (CMS/HCC)    • Depression    • GERD (gastroesophageal reflux disease)    • GI bleed    • Hepatitis C    • Hernia, diaphragmatic    • Hypertension    • IBS (irritable bowel syndrome)    • Iron deficiency    • Stroke (CMS/HCC)    • Syncope    • Syncope      Past Surgical History:  History reviewed. No pertinent surgical history.   Social History:   Social History     Tobacco Use   • Smoking status: Former Smoker     Last attempt to quit: 2019     Years since quittin.3   • Smokeless tobacco: Never Used   Substance Use Topics   • Alcohol use: Yes     Alcohol/week: 14.0 standard drinks     Types: 14 Shots of liquor per week     Comment: 2 DRINKS/DAY SINCE NOV. GRADUALLY INCREASING      Family History:  History reviewed. No pertinent family history.    Home Meds:  Medications Prior to Admission   Medication Sig Dispense Refill Last Dose   • acetaminophen (TYLENOL) 325 MG tablet Take 650 mg by mouth Every 6 (Six) Hours As Needed for Mild Pain .      • amLODIPine (NORVASC) 10 MG tablet Take 10 mg by mouth Daily.      • aspirin 81 MG EC tablet Take 81 mg by mouth Daily.      • atorvastatin (LIPITOR) 40 MG tablet Take 40 mg by mouth Every Night.      • carvedilol (COREG) 12.5 MG tablet Take 12.5 mg by mouth 2 (Two) Times a Day With Meals.      • cetirizine (zyrTEC) 5 MG tablet Take 5 mg by mouth Daily.      • dextromethorphan-guaifenesin (ROBITUSSIN-DM)  MG/5ML syrup Take 10 mL by mouth 4 (Four) Times a Day As Needed (cough/congestion).      • DULoxetine (CYMBALTA) 60 MG capsule Take 60 mg by mouth Daily.      • famotidine (PEPCID) 40 MG tablet Take 20 mg by mouth 2 (Two) Times a Day.      • ferrous sulfate 325 (65 FE) MG tablet Take 325 mg by mouth Daily With Breakfast.      • folic acid (FOLVITE) 1 MG tablet Take 1 mg by mouth Daily.      • magnesium oxide (MAG-OX) 400 tablet tablet Take 400 mg by mouth Daily.      • melatonin 1 MG tablet Take 1 mg by mouth  Every Night.      • Multiple Vitamins-Minerals (MULTIVITAMIN ADULT PO) Take  by mouth Daily.      • nitroglycerin (NITROSTAT) 0.4 MG SL tablet Place 0.4 mg under the tongue Every 5 (Five) Minutes As Needed for Chest Pain. Take no more than 3 doses in 15 minutes.      • ondansetron (ZOFRAN) 4 MG tablet Take 4 mg by mouth Every 6 (Six) Hours As Needed for Nausea or Vomiting.      • Polyethylene Glycol 3350 (MIRALAX PO) Take 17 g by mouth Daily As Needed.      • thiamine (VITAMIN B-1) 100 MG tablet Take 100 mg by mouth Daily.      • vitamin C (ASCORBIC ACID) 500 MG tablet Take 500 mg by mouth Daily.        Current Meds:     amLODIPine 10 mg Oral Daily   aspirin 81 mg Oral Daily   atorvastatin 40 mg Oral Nightly   carvedilol 12.5 mg Oral BID With Meals   DULoxetine 60 mg Oral Daily   famotidine 20 mg Oral BID AC   ferrous sulfate 325 mg Oral Daily With Breakfast   folic acid 1 mg Oral Daily   magnesium oxide 400 mg Oral Daily   melatonin 1 mg Oral Nightly   thiamine 100 mg Oral Daily     Allergies:  Allergies   Allergen Reactions   • Valsartan Nausea And Vomiting     Review of Systems  Pertinent items are noted in HPI, all other systems reviewed and negative     Objective     Vital Signs  Temp:  [98.6 °F (37 °C)-99.7 °F (37.6 °C)] 98.6 °F (37 °C)  Heart Rate:  [73-81] 73  Resp:  [16-18] 16  BP: (130-150)/(81-95) 139/81  Physical Exam:  General Appearance:    Alert, cooperative, in no acute distress   Head:    Normocephalic, without obvious abnormality, atraumatic   Eyes:          conjunctivae and sclerae normal, no   icterus   Throat:   no thrush, oral mucosa moist   Neck:   Supple, no adenopathy   Lungs:     Clear to auscultation bilaterally    Heart:    Regular rhythm and normal rate    Chest Wall:    No abnormalities observed   Abdomen:     Soft, nondistended, nontender; normal bowel sounds   Extremities:   no edema, no redness   Skin:  Scattered ecchymosis over bilateral upper extremities   Psychiatric:  normal mood  and insight     Results Review:   I reviewed the patient's new clinical results.    Results from last 7 days   Lab Units 02/11/20  1031 02/10/20  1749 02/10/20  0403 02/08/20  0840   WBC 10*3/mm3  --   --  4.29 7.03   HEMOGLOBIN g/dL 8.5* 7.9* 7.3* 9.2*   HEMATOCRIT % 26.4* 24.8* 22.7* 28.5*   PLATELETS 10*3/mm3  --   --  231 351     Results from last 7 days   Lab Units 02/10/20  2212 02/10/20  0403 02/08/20  0840   SODIUM mmol/L 135* 138 141   POTASSIUM mmol/L 4.1  4.0 3.2* 4.0   CHLORIDE mmol/L 102 105 105   CO2 mmol/L 17.5* 18.3* 20.1*   BUN mg/dL 4* 7* 12   CREATININE mg/dL 0.78 0.81 1.13*   CALCIUM mg/dL 8.8 8.3* 8.7   BILIRUBIN mg/dL  --   --  0.2   ALK PHOS U/L  --   --  97   ALT (SGPT) U/L  --   --  10   AST (SGOT) U/L  --   --  23   GLUCOSE mg/dL 103* 123* 109*     Results from last 7 days   Lab Units 02/08/20  0840   INR  1.23*     Lab Results   Lab Value Date/Time    LIPASE 233 10/04/2019 0217    LIPASE 151 09/29/2019 0115    LIPASE 400 (H) 09/28/2019 0856       Radiology:  CT Upper Extremity Right Without Contrast   Final Result      CT Head Without Contrast   Final Result      XR Elbow 2 View Right   Final Result          Assessment/Plan   Patient Active Problem List   Diagnosis   • Fall   • Alcohol withdrawal syndrome without complication (CMS/HCC)   • Essential hypertension   • Osteoarthritis of left knee   • Alcohol withdrawal (CMS/HCC)       Assessment:  1.  Anemia, heme positive stool-chronic  2.  History of cirrhosis secondary to alcohol abuse and hep C  3.  Recurrent falls  4.  History of rectal bleeding    Plan:  · Given persistent anemia and heme positive stool along with reported rectal bleeding albeit not recently, would go ahead and repeat both EGD and colonoscopy for further evaluation.  She is eaten today so we will start her on a clear liquid diet tomorrow and plan for prep thereafter with scopes to be performed on 2/13.  · 2004 liver biopsy with findings of cirrhosis-she has no  knowledge of this.  We will check a liver ultrasound and an AFP in the morning.    · She has a history of hepatitis C which she reports has been eradicated.  We will check a hepatitis C viral load.  · No gross bleeding at this time-we will follow hemoglobin      I discussed the patients findings and my recommendations with patient and family.    Sherrie Kay MD

## 2020-02-11 NOTE — PROGRESS NOTES
Discharge Planning Assessment  [facility]     Patient Name: Marylin Wilcox  MRN: [id number]  Today's Date: 2/11/2020    Admit Date: 2/8/2020    Discharge Needs Assessment    No documentation.       Discharge Plan     Row Name 02/11/20 1412       Plan    Plan  Return to [facility] via sister transport    Plan Comments  Facesheet information was verified.  Patient lives in personal care at [facility] assisted living.  She has no DME.  She denies rehab needs and is IADLs per her report.  She plans to return to [facility].  Spoke with Jw at [facility] and he accepts back.  Her sister will transport.  Denies other needs..........................Sherice Abbott RN         Destination      Coordination has not been started for this encounter.      Durable Medical Equipment      Coordination has not been started for this encounter.      Dialysis/Infusion      Coordination has not been started for this encounter.      Home Medical Care      Coordination has not been started for this encounter.      Therapy      Coordination has not been started for this encounter.      Community Resources      Coordination has not been started for this encounter.          Demographic Summary    No documentation.       Functional Status    No documentation.       Psychosocial    No documentation.       Abuse/Neglect    No documentation.       Legal    No documentation.       Substance Abuse    No documentation.       Patient Forms    No documentation.           Sherice Abbott RN

## 2020-02-12 ENCOUNTER — APPOINTMENT (OUTPATIENT)
Dept: ULTRASOUND IMAGING | Facility: HOSPITAL | Age: 60
End: 2020-02-12

## 2020-02-12 PROBLEM — D50.9 IRON DEFICIENCY ANEMIA: Status: ACTIVE | Noted: 2020-02-08

## 2020-02-12 PROBLEM — R19.5 HEME POSITIVE STOOL: Status: ACTIVE | Noted: 2020-02-08

## 2020-02-12 LAB
ALBUMIN SERPL-MCNC: 3.8 G/DL (ref 3.5–5.2)
ALBUMIN/GLOB SERPL: 1.5 G/DL
ALP SERPL-CCNC: 79 U/L (ref 39–117)
ALPHA-FETOPROTEIN: 2.27 NG/ML (ref 0–8.3)
ALT SERPL W P-5'-P-CCNC: 9 U/L (ref 1–33)
ANION GAP SERPL CALCULATED.3IONS-SCNC: 16.1 MMOL/L (ref 5–15)
AST SERPL-CCNC: 20 U/L (ref 1–32)
BASOPHILS # BLD AUTO: 0.04 10*3/MM3 (ref 0–0.2)
BASOPHILS NFR BLD AUTO: 0.8 % (ref 0–1.5)
BILIRUB SERPL-MCNC: 0.3 MG/DL (ref 0.2–1.2)
BUN BLD-MCNC: 4 MG/DL (ref 8–23)
BUN/CREAT SERPL: 4.2 (ref 7–25)
CALCIUM SPEC-SCNC: 9.2 MG/DL (ref 8.6–10.5)
CHLORIDE SERPL-SCNC: 102 MMOL/L (ref 98–107)
CO2 SERPL-SCNC: 17.9 MMOL/L (ref 22–29)
CREAT BLD-MCNC: 0.95 MG/DL (ref 0.57–1)
DEPRECATED RDW RBC AUTO: 49 FL (ref 37–54)
EOSINOPHIL # BLD AUTO: 0.17 10*3/MM3 (ref 0–0.4)
EOSINOPHIL NFR BLD AUTO: 3.3 % (ref 0.3–6.2)
ERYTHROCYTE [DISTWIDTH] IN BLOOD BY AUTOMATED COUNT: 14.2 % (ref 12.3–15.4)
GFR SERPL CREATININE-BSD FRML MDRD: 60 ML/MIN/1.73
GLOBULIN UR ELPH-MCNC: 2.5 GM/DL
GLUCOSE BLD-MCNC: 97 MG/DL (ref 65–99)
HCT VFR BLD AUTO: 25.5 % (ref 34–46.6)
HGB BLD-MCNC: 8.4 G/DL (ref 12–15.9)
IMM GRANULOCYTES # BLD AUTO: 0.02 10*3/MM3 (ref 0–0.05)
IMM GRANULOCYTES NFR BLD AUTO: 0.4 % (ref 0–0.5)
LYMPHOCYTES # BLD AUTO: 1.39 10*3/MM3 (ref 0.7–3.1)
LYMPHOCYTES NFR BLD AUTO: 26.6 % (ref 19.6–45.3)
MCH RBC QN AUTO: 31.3 PG (ref 26.6–33)
MCHC RBC AUTO-ENTMCNC: 32.9 G/DL (ref 31.5–35.7)
MCV RBC AUTO: 95.1 FL (ref 79–97)
MONOCYTES # BLD AUTO: 0.71 10*3/MM3 (ref 0.1–0.9)
MONOCYTES NFR BLD AUTO: 13.6 % (ref 5–12)
NEUTROPHILS # BLD AUTO: 2.89 10*3/MM3 (ref 1.7–7)
NEUTROPHILS NFR BLD AUTO: 55.3 % (ref 42.7–76)
NRBC BLD AUTO-RTO: 0 /100 WBC (ref 0–0.2)
PLATELET # BLD AUTO: 280 10*3/MM3 (ref 140–450)
PMV BLD AUTO: 9.4 FL (ref 6–12)
POTASSIUM BLD-SCNC: 3.5 MMOL/L (ref 3.5–5.2)
PROT SERPL-MCNC: 6.3 G/DL (ref 6–8.5)
RBC # BLD AUTO: 2.68 10*6/MM3 (ref 3.77–5.28)
SODIUM BLD-SCNC: 136 MMOL/L (ref 136–145)
WBC NRBC COR # BLD: 5.22 10*3/MM3 (ref 3.4–10.8)

## 2020-02-12 PROCEDURE — 97110 THERAPEUTIC EXERCISES: CPT

## 2020-02-12 PROCEDURE — 80053 COMPREHEN METABOLIC PANEL: CPT | Performed by: INTERNAL MEDICINE

## 2020-02-12 PROCEDURE — 99232 SBSQ HOSP IP/OBS MODERATE 35: CPT | Performed by: INTERNAL MEDICINE

## 2020-02-12 PROCEDURE — 85025 COMPLETE CBC W/AUTO DIFF WBC: CPT | Performed by: INTERNAL MEDICINE

## 2020-02-12 PROCEDURE — 82105 ALPHA-FETOPROTEIN SERUM: CPT | Performed by: INTERNAL MEDICINE

## 2020-02-12 PROCEDURE — 76705 ECHO EXAM OF ABDOMEN: CPT

## 2020-02-12 PROCEDURE — 87522 HEPATITIS C REVRS TRNSCRPJ: CPT | Performed by: INTERNAL MEDICINE

## 2020-02-12 PROCEDURE — 97110 THERAPEUTIC EXERCISES: CPT | Performed by: OCCUPATIONAL THERAPIST

## 2020-02-12 RX ORDER — POLYETHYLENE GLYCOL 3350 17 G/17G
238 POWDER, FOR SOLUTION ORAL ONCE
Status: COMPLETED | OUTPATIENT
Start: 2020-02-12 | End: 2020-02-12

## 2020-02-12 RX ORDER — CHOLECALCIFEROL (VITAMIN D3) 125 MCG
5 CAPSULE ORAL NIGHTLY
Status: DISCONTINUED | OUTPATIENT
Start: 2020-02-12 | End: 2020-02-14 | Stop reason: HOSPADM

## 2020-02-12 RX ORDER — BISACODYL 5 MG/1
20 TABLET, DELAYED RELEASE ORAL ONCE
Status: COMPLETED | OUTPATIENT
Start: 2020-02-12 | End: 2020-02-12

## 2020-02-12 RX ADMIN — Medication 5 MG: at 22:08

## 2020-02-12 RX ADMIN — FAMOTIDINE 20 MG: 20 TABLET, FILM COATED ORAL at 09:42

## 2020-02-12 RX ADMIN — FERROUS SULFATE TAB 325 MG (65 MG ELEMENTAL FE) 325 MG: 325 (65 FE) TAB at 09:43

## 2020-02-12 RX ADMIN — Medication 400 MG: at 09:42

## 2020-02-12 RX ADMIN — AMLODIPINE BESYLATE 10 MG: 10 TABLET ORAL at 09:42

## 2020-02-12 RX ADMIN — ASPIRIN 81 MG: 81 TABLET, COATED ORAL at 09:42

## 2020-02-12 RX ADMIN — Medication 100 MG: at 09:42

## 2020-02-12 RX ADMIN — DULOXETINE HYDROCHLORIDE 60 MG: 60 CAPSULE, DELAYED RELEASE ORAL at 09:42

## 2020-02-12 RX ADMIN — CARVEDILOL 12.5 MG: 12.5 TABLET, FILM COATED ORAL at 09:42

## 2020-02-12 RX ADMIN — FOLIC ACID 1 MG: 1 TABLET ORAL at 09:42

## 2020-02-12 RX ADMIN — ATORVASTATIN CALCIUM 40 MG: 20 TABLET, FILM COATED ORAL at 22:08

## 2020-02-12 RX ADMIN — FAMOTIDINE 20 MG: 20 TABLET, FILM COATED ORAL at 18:05

## 2020-02-12 RX ADMIN — POLYETHYLENE GLYCOL 3350 238 G: 17 POWDER, FOR SOLUTION ORAL at 18:06

## 2020-02-12 RX ADMIN — BISACODYL 20 MG: 5 TABLET ORAL at 18:05

## 2020-02-12 NOTE — PROGRESS NOTES
Name: Marylin Wilcox ADMIT: 2020   : 1960  PCP: Provider, No Known    MRN: [id number] LOS: 2 days   AGE/SEX: 59 y.o. female  ROOM: [facility]/   Subjective   Chief Complaint   Patient presents with   • Alcohol Intoxication   • Fall     unwitnessed      No withdrawal symptoms. No abdominal pain or swelling. Difficulty sleeping last night. No CP SOA NVD.    Objective   Vital Signs  Temp:  [98 °F (36.7 °C)-98.6 °F (37 °C)] 98 °F (36.7 °C)  Heart Rate:  [73-82] 82  Resp:  [16-18] 18  BP: (124-144)/(81-99) 144/99  SpO2:  [96 %] 96 %  on   ;   Device (Oxygen Therapy): room air  Body mass index is 25.06 kg/m².    Physical Exam   Constitutional: She appears well-developed. No distress.   frail   HENT:   Head: Atraumatic.   Nose: Nose normal.   Eyes: Conjunctivae and EOM are normal.   Neck: Neck supple. No tracheal deviation present.   Cardiovascular: Normal rate and regular rhythm.   Pulmonary/Chest: Effort normal. She has no wheezes.   Abdominal: Soft. She exhibits no distension. There is no tenderness. There is no rebound and no guarding.   Musculoskeletal:   Left knee with crepitus and she reports chronic swelling compared to the right. Abrasion right forearm.   Neurological: She is alert. No cranial nerve deficit.   Skin: Skin is warm and dry. She is not diaphoretic.   Psychiatric: She has a normal mood and affect. Her behavior is normal.   Nursing note and vitals reviewed.      Results Review:       I reviewed the patient's new clinical results.        Results from last 7 days   Lab Units 20  0522 20  1031 02/10/20  1749 02/10/20  0403 20  0840   WBC 10*3/mm3 5.22  --   --  4.29 7.03   HEMOGLOBIN g/dL 8.4* 8.5* 7.9* 7.3* 9.2*   PLATELETS 10*3/mm3 280  --   --  231 351     Results from last 7 days   Lab Units 20  0522 02/10/20  2212 02/10/20  0403 20  0840   SODIUM mmol/L 136 135* 138 141   POTASSIUM mmol/L 3.5 4.1  4.0 3.2* 4.0   CHLORIDE mmol/L 102 102 105 105   CO2 mmol/L 17.9*  17.5* 18.3* 20.1*   BUN mg/dL 4* 4* 7* 12   CREATININE mg/dL 0.95 0.78 0.81 1.13*   GLUCOSE mg/dL 97 103* 123* 109*   Estimated Creatinine Clearance: 72.2 mL/min (by C-G formula based on SCr of 0.95 mg/dL).  Results from last 7 days   Lab Units 02/12/20  0522 02/10/20  2212 02/10/20  0403 02/08/20  0840   CALCIUM mg/dL 9.2 8.8 8.3* 8.7   ALBUMIN g/dL 3.80  --   --  4.50   MAGNESIUM mg/dL  --  1.6 1.6 2.7*         amLODIPine 10 mg Oral Daily   aspirin 81 mg Oral Daily   atorvastatin 40 mg Oral Nightly   carvedilol 12.5 mg Oral BID With Meals   DULoxetine 60 mg Oral Daily   famotidine 20 mg Oral BID AC   ferrous sulfate 325 mg Oral Daily With Breakfast   folic acid 1 mg Oral Daily   magnesium oxide 400 mg Oral Daily   melatonin 1 mg Oral Nightly   thiamine 100 mg Oral Daily      Diet Clear Liquid    Assessment/Plan      Active Hospital Problems    Diagnosis  POA   • **Alcohol withdrawal syndrome without complication (CMS/HCC) [F10.230]  Yes   • Essential hypertension [I10]  Yes   • Osteoarthritis of left knee [M17.12]  Yes   • Alcohol withdrawal (CMS/HCC) [F10.239]  Yes   • Fall [W19.XXXA]  Yes      Resolved Hospital Problems   No resolved problems to display.       · Alcohol Intoxication/Withdrawal: Continue vitamin supplementation. No acute withdrawal. Access evaluated.  · Right Elbow Fracture: Chronic fracture with new contusion. Appreciate orthopedic evaluation.  · OA Left Knee: Follow up with home orthopedist.  · Fall  · Fe Def Anemia: Hgb has stabilized and no overt GI losses. Hx of cirrhotic change on prior imaging. Repeat US is pending and planned endoscopy tomorrow. Continue oral iron replacement. GI following.  · Dispostion: Return to skilled nursing, possibly tomorrow if doing well after endoscopy    Stanislav Gonzalez MD  [facility]  02/12/20  10:17 AM    Dictated portions using Dragon dictation software.

## 2020-02-12 NOTE — PLAN OF CARE
Problem: Patient Care Overview  Goal: Plan of Care Review  2/12/2020 1310 by Malini Gonzales, OTR  Flowsheets (Taken 2/12/2020 1300)  Progress: improving  Plan of Care Reviewed With: patient  Outcome Summary: pt completed AROM and theraband ex sitting EOB. pt was independent w. bed mobility. Pt did not want to wash up yet today. Pt cont to benefit from OT to incr ADLs, strength, and endurance.  2/12/2020 1309 by Malini Gonzales, OTR  Flowsheets (Taken 2/12/2020 1300)  Progress: improving  Plan of Care Reviewed With: patient  Outcome Summary: pt completed AROM and theraband ex sitting EOB. pt was independent w. bed mobility. Pt did not want to wash up yet today. Pt cont to benefit from OT to incr ADLs, strength, and endurance.

## 2020-02-12 NOTE — THERAPY DISCHARGE NOTE
Patient Name: Marylin Wilcox  : 1960    MRN: [id number]                              Today's Date: 2020       Admit Date: 2020    Visit Dx:     ICD-10-CM ICD-9-CM   1. Fall, initial encounter W19.XXXA E888.9   2. Acute alcoholic intoxication in alcoholism with complication (CMS/HCC) F10.229 303.00   3. Elbow fracture, right, closed, initial encounter S42.401A 812.40   4. Left hemiparesis (CMS/HCC) G81.94 342.90     Patient Active Problem List   Diagnosis   • Fall   • Alcohol withdrawal syndrome without complication (CMS/HCC)   • Essential hypertension   • Osteoarthritis of left knee   • Alcohol withdrawal (CMS/HCC)     Past Medical History:   Diagnosis Date   • Alcohol abuse    • Allergic rhinitis    • Anemia    • Crohn's colitis (CMS/HCC)    • Depression    • GERD (gastroesophageal reflux disease)    • GI bleed    • Hepatitis C    • Hernia, diaphragmatic    • Hypertension    • IBS (irritable bowel syndrome)    • Iron deficiency    • Stroke (CMS/HCC)    • Syncope    • Syncope      History reviewed. No pertinent surgical history.  General Information     Row Name 20 1146          PT Evaluation Time/Intention    Document Type  discharge treatment;therapy note (daily note)  -EJ     Mode of Treatment  physical therapy  -EJ       User Key  (r) = Recorded By, (t) = Taken By, (c) = Cosigned By    Initials Name Provider Type    Ana Lilia Martin, PT Physical Therapist        Mobility     Row Name 20 1146          Bed Mobility Assessment/Treatment    [place] Level (Bed Mobility)  conditional independence  -EJ     Row Name 20 1146          Sit-Stand Transfer    Sit-Stand [place] (Transfers)  stand by assist  -EJ     Row Name 20 1146          Gait/Stairs Assessment/Training    Gait/Stairs Assessment/Training  gait/ambulation independence  -EJ     [place] Level (Gait)  verbal cues;stand by assist  -EJ     Distance in Feet (Gait)  450  -EJ     Deviations/Abnormal Patterns  (Gait)  renetta decreased  -EJ     Comment (Gait/Stairs)  no LOb or complaints with activity.  -EJ       User Key  (r) = Recorded By, (t) = Taken By, (c) = Cosigned By    Initials Name Provider Type    Ana Lilia Martin, PT Physical Therapist        Obj/Interventions    No documentation.       Goals/Plan    No documentation.       Clinical Impression     Row Name 02/12/20 1147          Pain Scale: Numbers Pre/Post-Treatment    Pain Scale: Numbers, Pretreatment  0/10 - no pain  -EJ     Pain Scale: Numbers, Post-Treatment  0/10 - no pain  -EJ     Row Name 02/12/20 1147          Plan of Care Review    Plan of Care Reviewed With  patient  -EJ     Progress  improving  -EJ     Outcome Summary  Pt doing well today. She is progressing with mobility and able to increase ambulation distance, walking over 450 ft without any concerns. Skilled PT no longer indicated. Pt may ambulate with nsg and family as needed.   -EJ     Row Name 02/12/20 1147          Positioning and Restraints    Pre-Treatment Position  in bed  -EJ     Post Treatment Position  bed  -EJ     In Bed  notified nsg;supine;call light within reach;encouraged to call for assist  -EJ       User Key  (r) = Recorded By, (t) = Taken By, (c) = Cosigned By    Initials Name Provider Type    Ana Lilia Martin, PT Physical Therapist        Outcome Measures     Row Name 02/12/20 1149          How much help from another person do you currently need...    Turning from your back to your side while in flat bed without using bedrails?  4  -EJ     Moving from lying on back to sitting on the side of a flat bed without bedrails?  4  -EJ     Moving to and from a bed to a chair (including a wheelchair)?  4  -EJ     Standing up from a chair using your arms (e.g., wheelchair, bedside chair)?  4  -EJ     Climbing 3-5 steps with a railing?  3  -EJ     To walk in hospital room?  3  -EJ     AM-PAC 6 Clicks Score (PT)  22  -EJ       User Key  (r) = Recorded By, (t) = Taken By, (c) =  Cosigned By    Initials Name Provider Type    Ana Lilia Martin, PT Physical Therapist        Physical Therapy Education                 Title: PT OT SLP Therapies (In Progress)     Topic: Physical Therapy (In Progress)     Point: Mobility training (Done)     Description:   Instruct learner(s) on safety and technique for assisting patient out of bed, chair or wheelchair.  Instruct in the proper use of assistive devices, such as walker, crutches, cane or brace.              Patient Friendly Description:   It's important to get you on your feet again, but we need to do so in a way that is safe for you. Falling has serious consequences, and your personal safety is the most important thing of all.        When it's time to get out of bed, one of us or a family member will sit next to you on the bed to give you support.     If your doctor or nurse tells you to use a walker, crutches, a cane, or a brace, be sure you use it every time you get out of bed, even if you think you don't need it.    Learning Progress Summary           Patient Acceptance, E,TB, VU by ZHANG at 2/12/2020 1149    Acceptance, E,TB,D, VU by ZHANG at 2/11/2020 1632    Acceptance, E, NR by AR at 2/10/2020 0844                   Point: Home exercise program (In Progress)     Description:   Instruct learner(s) on appropriate technique for monitoring, assisting and/or progressing patient with therapeutic exercises and activities.              Learning Progress Summary           Patient Acceptance, E, NR by AR at 2/10/2020 0844                   Point: Body mechanics (In Progress)     Description:   Instruct learner(s) on proper positioning and spine alignment for patient and/or caregiver during mobility tasks and/or exercises.              Learning Progress Summary           Patient Acceptance, E, NR by AR at 2/10/2020 0844                   Point: Precautions (In Progress)     Description:   Instruct learner(s) on prescribed precautions during mobility and  gait tasks              Learning Progress Summary           Patient Acceptance, E, NR by AR at 2/10/2020 0844                               User Key     Initials Effective Dates Name Provider Type [facility] 04/03/18 -  Ana Lilia Donovan, PT Physical Therapist PT    AR 04/03/18 -  Casandra Reich PT Physical Therapist PT              PT Recommendation and Plan     Plan of Care Reviewed With: patient  Progress: improving  Outcome Summary: Pt doing well today. She is progressing with mobility and able to increase ambulation distance, walking over 450 ft without any concerns. Skilled PT no longer indicated. Pt may ambulate with nsg and family as needed.      Time Calculation:   PT Charges     Row Name 02/12/20 1149             Time Calculation    Start Time  1030  -EJ      Stop Time  1048  -EJ      Time Calculation (min)  18 min  -EJ      PT Received On  02/12/20  -EJ        User Key  (r) = Recorded By, (t) = Taken By, (c) = Cosigned By    Initials Name Provider Type    EJ Ana Lilia Donovan, PT Physical Therapist        Therapy Charges for Today     Code Description Service Date Service Provider Modifiers Qty    67095755565 HC PT THER PROC EA 15 MIN 2/11/2020 Ana Lilia Donovan, PT GP 1    20001801203 HC PT THER PROC EA 15 MIN 2/12/2020 Ana Lilia Donovan, PT GP 1          PT G-Codes  Outcome Measure Options: AM-PAC 6 Clicks Daily Activity (OT)  AM-PAC 6 Clicks Score (PT): 22  AM-PAC 6 Clicks Score (OT): 19    PT Discharge Summary  Reason for Discharge: Independent    Ana Lilia Donovan, REZA  2/12/2020

## 2020-02-12 NOTE — PROGRESS NOTES
Met w/ patient and reviewed chart.  Patient has agreed to endo tomorrow.  She acknowledged having the list of outpatient mental health resources provided to her by [facility] yesterday.  When discussed need to stop ETOH consumption she stated that she had to deal w/ her depression first. Explained that ETOH is a depression. Supported her wanting to see therapist to help her deal with stressors.

## 2020-02-12 NOTE — PROGRESS NOTES
[facility]  Inpatient Progress Note    Reason for Follow Up:  Anemia, heme positive stool    Subjective     Interval History:   She feels better today.  She denies any abdominal pain or nausea.  She is agreeable to endoscopy tomorrow    Current Facility-Administered Medications:   •  acetaminophen (TYLENOL) tablet 650 mg, 650 mg, Oral, Q6H PRN, Gloria Mathis APRN, 650 mg at 02/11/20 1600  •  amLODIPine (NORVASC) tablet 10 mg, 10 mg, Oral, Daily, Gloria Mathis APRN, 10 mg at 02/12/20 0942  •  aspirin EC tablet 81 mg, 81 mg, Oral, Daily, Gloria Mathis APRN, 81 mg at 02/12/20 0942  •  atorvastatin (LIPITOR) tablet 40 mg, 40 mg, Oral, Nightly, Gloria Mathis APRN, 40 mg at 02/11/20 2112  •  carvedilol (COREG) tablet 12.5 mg, 12.5 mg, Oral, BID With Meals, Gloria Mathis APRN, 12.5 mg at 02/12/20 0942  •  DULoxetine (CYMBALTA) DR capsule 60 mg, 60 mg, Oral, Daily, Gloria Mathis APRN, 60 mg at 02/12/20 0942  •  famotidine (PEPCID) tablet 20 mg, 20 mg, Oral, BID AC, Gloria Mathis APRN, 20 mg at 02/12/20 0942  •  ferrous sulfate tablet 325 mg, 325 mg, Oral, Daily With Breakfast, Gloria Mathis APRN, 325 mg at 02/12/20 0943  •  folic acid (FOLVITE) tablet 1 mg, 1 mg, Oral, Daily, Gloria Mathis APRN, 1 mg at 02/12/20 0942  •  hydrALAZINE (APRESOLINE) tablet 25 mg, 25 mg, Oral, Q6H PRN, Stanislav Gonzalez MD  •  LORazepam (ATIVAN) tablet 1 mg, 1 mg, Oral, Q2H PRN **OR** LORazepam (ATIVAN) injection 1 mg, 1 mg, Intravenous, Q2H PRN **OR** LORazepam (ATIVAN) tablet 2 mg, 2 mg, Oral, Q1H PRN, 2 mg at 02/09/20 0612 **OR** LORazepam (ATIVAN) injection 2 mg, 2 mg, Intravenous, Q1H PRN **OR** LORazepam (ATIVAN) injection 2 mg, 2 mg, Intravenous, Q15 Min PRN **OR** LORazepam (ATIVAN) injection 2 mg, 2 mg, Intramuscular, Q15 Min PRN, StinglReyna MD  •  magnesium oxide (MAG-OX) tablet 400 mg, 400 mg, Oral, Daily, Gloria Mathis  ANJEL Crooks, 400 mg at 02/12/20 0942  •  Magnesium Sulfate 2 gram Bolus, followed by 8 gram infusion (total Mg dose 10 grams)- Mg less than or equal to 1mg/dL, 2 g, Intravenous, PRN **OR** Magnesium Sulfate 2 gram / 50mL Infusion (GIVE X 3 BAGS TO EQUAL 6GM TOTAL DOSE) - Mg 1.1 - 1.5 mg/dl, 2 g, Intravenous, PRN **OR** Magnesium Sulfate 4 gram infusion- Mg 1.6-1.9 mg/dL, 4 g, Intravenous, PRN, Stanislav Gonzalez MD  •  melatonin tablet 5 mg, 5 mg, Oral, Nightly, Stanislav Gonzalez MD  •  nitroglycerin (NITROSTAT) SL tablet 0.4 mg, 0.4 mg, Sublingual, Q5 Min PRN, Gloria Mathis APRN  •  ondansetron (ZOFRAN) tablet 4 mg, 4 mg, Oral, Q6H PRN **OR** ondansetron (ZOFRAN) injection 4 mg, 4 mg, Intravenous, Q6H PRN, Reyna Cao MD  •  ondansetron (ZOFRAN) tablet 4 mg, 4 mg, Oral, Q6H PRN, Gloria Mathis APRN  •  polyethylene glycol (MIRALAX) powder 17 g, 17 g, Oral, Daily PRN, Gloria Mathis APRN  •  potassium chloride (MICRO-K) CR capsule 40 mEq, 40 mEq, Oral, PRN, 40 mEq at 02/10/20 1821 **OR** potassium chloride (KLOR-CON) packet 40 mEq, 40 mEq, Oral, PRN **OR** potassium chloride 10 mEq in 100 mL IVPB, 10 mEq, Intravenous, Q1H PRN, Stanislav Gonzalez MD  •  [COMPLETED] Insert peripheral IV, , , Once **AND** sodium chloride 0.9 % flush 10 mL, 10 mL, Intravenous, PRN, Gustavo Alvarez MD  •  thiamine (VITAMIN B-1) tablet 100 mg, 100 mg, Oral, Daily, Gloria Mathis APRN, 100 mg at 02/12/20 0942  Review of Systems:    The following systems were reviewed and negative;  constitution and gastrointestinal    Objective     Vital Signs  Temp:  [98 °F (36.7 °C)-98.4 °F (36.9 °C)] 98.4 °F (36.9 °C)  Heart Rate:  [73-82] 82  Resp:  [16-18] 18  BP: (115-144)/(87-99) 115/87  Body mass index is 25.06 kg/m².    Intake/Output Summary (Last 24 hours) at 2/12/2020 1356  Last data filed at 2/11/2020 1703  Gross per 24 hour   Intake 310 ml   Output --   Net 310 ml     No intake/output data  recorded.     Physical Exam:   General: patient awake, alert and cooperative   Eyes: Normal lids and lashes, no scleral icterus   Neck: supple, normal ROM   Skin: warm and dry, not jaundiced   Abdomen: soft, nontender, nondistended    Extremities: no rash or edema   Psychiatric: Normal mood and behavior; memory intact     Results Review:     I reviewed the patient's new clinical results.    Results from last 7 days   Lab Units 02/12/20  0522 02/11/20  1031 02/10/20  1749 02/10/20  0403 02/08/20  0840   WBC 10*3/mm3 5.22  --   --  4.29 7.03   HEMOGLOBIN g/dL 8.4* 8.5* 7.9* 7.3* 9.2*   HEMATOCRIT % 25.5* 26.4* 24.8* 22.7* 28.5*   PLATELETS 10*3/mm3 280  --   --  231 351     Results from last 7 days   Lab Units 02/12/20  0522 02/10/20  2212 02/10/20  0403 02/08/20  0840   SODIUM mmol/L 136 135* 138 141   POTASSIUM mmol/L 3.5 4.1  4.0 3.2* 4.0   CHLORIDE mmol/L 102 102 105 105   CO2 mmol/L 17.9* 17.5* 18.3* 20.1*   BUN mg/dL 4* 4* 7* 12   CREATININE mg/dL 0.95 0.78 0.81 1.13*   CALCIUM mg/dL 9.2 8.8 8.3* 8.7   BILIRUBIN mg/dL 0.3  --   --  0.2   ALK PHOS U/L 79  --   --  97   ALT (SGPT) U/L 9  --   --  10   AST (SGOT) U/L 20  --   --  23   GLUCOSE mg/dL 97 103* 123* 109*     Results from last 7 days   Lab Units 02/08/20  0840   INR  1.23*     Lab Results   Lab Value Date/Time    LIPASE 233 10/04/2019 0217    LIPASE 151 09/29/2019 0115    LIPASE 400 (H) 09/28/2019 0856       Radiology:  CT Upper Extremity Right Without Contrast   Final Result      CT Head Without Contrast   Final Result      XR Elbow 2 View Right   Final Result      US Liver    (Results Pending)       Assessment/Plan     Patient Active Problem List   Diagnosis   • Fall   • Alcohol withdrawal syndrome without complication (CMS/HCC)   • Essential hypertension   • Osteoarthritis of left knee   • Alcohol withdrawal (CMS/HCC)       Assessment:  1. Anemia, heme positive stool-chronic  2.  History of cirrhosis secondary to alcohol abuse and hep C- nml afp  3.   Recurrent falls  4.  History of rectal bleeding         Plan:  · Given persistent anemia and heme positive stool along with reported rectal bleeding albeit not recently, would go ahead and repeat both EGD and colonoscopy for further evaluation.   Planned for tomorrow  · Follow-up hepatitis C viral load  · Follow-up liver ultrasound  · Labs stable    I discussed the patients findings and my recommendations with patient.    Sherrie Kay MD

## 2020-02-12 NOTE — PROGRESS NOTES
Continued Stay Note  [facility]     Patient Name: Marylin Wilcox  MRN: [id number]  Today's Date: 2/12/2020    Admit Date: 2/8/2020    Discharge Plan     Row Name 02/12/20 1120       Plan    Plan  Return to [facility] assisted living via family transport    Plan Comments  Spoke with Jw at [facility].  He states that the residents use the [facility] out of [place] for prescriptions.  Updated Epic.  Patient did not have pharmacy information.......................Sherice Abbott RN        Discharge Codes    No documentation.             Sherice Abbott RN

## 2020-02-12 NOTE — PLAN OF CARE
Problem: Patient Care Overview  Goal: Plan of Care Review  Flowsheets (Taken 2/12/2020 1300)  Progress: improving  Plan of Care Reviewed With: patient  Outcome Summary: pt completed AROM and theraband ex sitting EOB. pt was independent w. bed mobility. Pt did not want to wash up yet today. Pt cont to benefit from OT to incr ADLs, strength, and endurance.

## 2020-02-12 NOTE — PLAN OF CARE
Problem: Patient Care Overview  Goal: Plan of Care Review  Outcome: Ongoing (interventions implemented as appropriate)  Flowsheets (Taken 2/12/2020 0530)  Plan of Care Reviewed With: patient  Goal: Individualization and Mutuality  Outcome: Ongoing (interventions implemented as appropriate)  Flowsheets (Taken 2/12/2020 0530)  Patient Specific Interventions: documented CIWA scale  Goal: Discharge Needs Assessment  Outcome: Ongoing (interventions implemented as appropriate)  Goal: Interprofessional Rounds/Family Conf  Outcome: Ongoing (interventions implemented as appropriate)  Flowsheets (Taken 2/12/2020 0530)  Participants: patient; nursing     Problem: Fall Risk (Adult)  Goal: Absence of Fall  Outcome: Ongoing (interventions implemented as appropriate)  Flowsheets (Taken 2/12/2020 0530)  Absence of Fall: making progress toward outcome     Problem: Skin Injury Risk (Adult)  Goal: Skin Health and Integrity  Outcome: Ongoing (interventions implemented as appropriate)  Flowsheets (Taken 2/12/2020 0530)  Skin Health and Integrity: making progress toward outcome     Problem: Patient Care Overview  Goal: Plan of Care Review  2/12/2020 0532 by Malaika Dove RN  Flowsheets (Taken 2/12/2020 0532)  Outcome Summary: A&O at this time without s/s withdrawal. Denies pain/needs. VSS  2/12/2020 0530 by Malaika Dove RN  Outcome: Ongoing (interventions implemented as appropriate)  Flowsheets (Taken 2/12/2020 0530)  Plan of Care Reviewed With: patient  Goal: Individualization and Mutuality  Outcome: Ongoing (interventions implemented as appropriate)  Flowsheets (Taken 2/12/2020 0530)  Patient Specific Interventions: documented CIWA scale  Goal: Discharge Needs Assessment  Outcome: Ongoing (interventions implemented as appropriate)  Goal: Interprofessional Rounds/Family Conf  Outcome: Ongoing (interventions implemented as appropriate)  Flowsheets (Taken 2/12/2020 0530)  Participants: patient;  nursing     Problem: Fall Risk (Adult)  Goal: Absence of Fall  Outcome: Ongoing (interventions implemented as appropriate)  Flowsheets (Taken 2/12/2020 0530)  Absence of Fall: making progress toward outcome     Problem: Skin Injury Risk (Adult)  Goal: Skin Health and Integrity  Outcome: Ongoing (interventions implemented as appropriate)  Flowsheets (Taken 2/12/2020 0530)  Skin Health and Integrity: making progress toward outcome

## 2020-02-12 NOTE — PLAN OF CARE
Problem: Patient Care Overview  Goal: Plan of Care Review  Flowsheets (Taken 2/12/2020 1147)  Progress: improving  Plan of Care Reviewed With: patient  Outcome Summary: Pt doing well today. She is progressing with mobility and able to increase ambulation distance, walking over 450 ft without any concerns. Skilled PT no longer indicated. Pt may ambulate with nsg and family as needed.

## 2020-02-12 NOTE — THERAPY TREATMENT NOTE
Acute Care - Occupational Therapy Treatment Note  [facility]     Patient Name: Marylin Wilcox  : 1960  MRN: [id number]  Today's Date: 2020             Admit Date: 2020       ICD-10-CM ICD-9-CM   1. Fall, initial encounter W19.XXXA E888.9   2. Acute alcoholic intoxication in alcoholism with complication (CMS/HCC) F10.229 303.00   3. Elbow fracture, right, closed, initial encounter S42.401A 812.40   4. Left hemiparesis (CMS/HCC) G81.94 342.90     Patient Active Problem List   Diagnosis   • Fall   • Alcohol withdrawal syndrome without complication (CMS/HCC)   • Essential hypertension   • Osteoarthritis of left knee   • Alcohol withdrawal (CMS/HCC)     Past Medical History:   Diagnosis Date   • Alcohol abuse    • Allergic rhinitis    • Anemia    • Crohn's colitis (CMS/HCC)    • Depression    • GERD (gastroesophageal reflux disease)    • GI bleed    • Hepatitis C    • Hernia, diaphragmatic    • Hypertension    • IBS (irritable bowel syndrome)    • Iron deficiency    • Stroke (CMS/HCC)    • Syncope    • Syncope      History reviewed. No pertinent surgical history.    Therapy Treatment    Rehabilitation Treatment Summary     Row Name 20 1300             Treatment Time/Intention    Discipline  occupational therapist  -      Document Type  therapy note (daily note)  -      Subjective Information  no complaints  -      Mode of Treatment  individual therapy;occupational therapy  -      Patient/Family Observations  pt supine in bed.   -KP      Patient Effort  good  -KP      Existing Precautions/Restrictions  fall  -KP      Recorded by [] Malini Gonzales OTR 20 1309      Row Name 20 1300             Cognitive Assessment/Intervention- PT/OT    Orientation Status (Cognition)  oriented x 3  -KP      Follows Commands (Cognition)  follows one step commands;over 90% accuracy  -      Safety Deficit (Cognitive)  mild deficit  -KP      Recorded by [] Malini Gonzales OTR  02/12/20 1309      Row Name 02/12/20 1300             Bed Mobility Assessment/Treatment    [place] Level (Bed Mobility)  independent  -KP      Supine-Sit [place] (Bed Mobility)  independent  -KP      Recorded by [KP] Malini Gonzales OTR 02/12/20 1309      Row Name 02/12/20 1300             Sit-Stand Transfer    Sit-Stand [place] (Transfers)  supervision  -KP      Recorded by [KP] Malini Gonzales OTR 02/12/20 1309      Row Name 02/12/20 1300             Stand-Sit Transfer    Stand-Sit [place] (Transfers)  supervision  -KP      Recorded by [KP] Malini Gonzales OTR 02/12/20 1309      Row Name 02/12/20 1300             Therapeutic Exercise    Upper Extremity Range of Motion (Therapeutic Exercise)  shoulder flexion/extension, left;shoulder flexion/extension, right;elbow flexion/extension, left;elbow flexion/extension, right;forearm supination/pronation, right;forearm supination/pronation, left  -KP      Exercise Type (Therapeutic Exercise)  AROM (active range of motion);resistive exercises  -KP      Position (Therapeutic Exercise)  seated  -KP      Sets/Reps (Therapeutic Exercise)  10x2  -KP      Expected Outcome (Therapeutic Exercise)  improve performance, BADLs;improve performance, transfer skills;improve functional tolerance, self-care activity  -KP      Comment (Therapeutic Exercise)  theraband red and AROM  -KP      Recorded by [KP] Malini Gonzales OTR 02/12/20 1309      Row Name 02/12/20 1300             Static Sitting Balance    Level of [place] (Unsupported Sitting, Static Balance)  independent  -KP      Sitting Position (Unsupported Sitting, Static Balance)  sitting on edge of bed  -KP      Time Able to Maintain Position (Unsupported Sitting, Static Balance)  more than 5 minutes  -KP      Recorded by [KP] Malini Gonzales OTR 02/12/20 1309      Row Name 02/12/20 1300             Positioning and Restraints    Pre-Treatment Position  in bed  -KP       Post Treatment Position  bed  -KP      In Bed  supine;call light within reach;encouraged to call for assist;exit alarm on  -KP      Recorded by [] Janet Malini Guevara, OTR 02/12/20 1309      Row Name 02/12/20 1300             Pain Scale: Numbers Pre/Post-Treatment    Pain Scale: Numbers, Pretreatment  0/10 - no pain  -KP      Pain Scale: Numbers, Post-Treatment  0/10 - no pain  -KP      Recorded by [] Janet Malini Guevara, OTR 02/12/20 1309      Row Name 02/12/20 1300             Plan of Care Review    Plan of Care Reviewed With  patient  -KP      Progress  improving  -KP      Outcome Summary  pt completed AROM and theraband ex sitting EOB. pt was independent w. bed mobility. Pt did not want to wash up yet today. Pt cont to benefit from OT to incr ADLs, strength, and endurance.  -KP      Recorded by [] Malini Gonzales, OTR 02/12/20 1309      Row Name 02/12/20 1300             Outcome Summary/Treatment Plan (OT)    Daily Summary of Progress (OT)  progress toward functional goals is good  -KP      Recorded by [] Janet Malini Guevara, OTR 02/12/20 1309        User Key  (r) = Recorded By, (t) = Taken By, (c) = Cosigned By    Initials Name Effective Dates Discipline     Janet Malini Guevara, OTR 06/08/18 -  OT             Occupational Therapy Education                 Title: PT OT SLP Therapies (In Progress)     Topic: Occupational Therapy (In Progress)     Point: ADL training (Done)     Description:   Instruct learner(s) on proper safety adaptation and remediation techniques during self care or transfers.   Instruct in proper use of assistive devices.              Learning Progress Summary           Patient Acceptance, E,D, VU,NR by MARGARET at 2/10/2020 2087    Comment:  OT educ on OT role in therapeutic process and pt's POC. OT also educ on B UE thera ex for improved UE strength and endurance.                   Point: Home exercise program (Done)     Description:   Instruct learner(s) on  appropriate technique for monitoring, assisting and/or progressing therapeutic exercises/activities.              Learning Progress Summary           Patient Acceptance, E,D, VU,NR by MARGARET at 2/10/2020 4651    Comment:  OT educ on OT role in therapeutic process and pt's POC. OT also educ on B UE thera ex for improved UE strength and endurance.                               User Key     Initials Effective Dates Name Provider Type Discipline    RD 10/14/19 -  Wanda Desai OT Occupational Therapist OT                OT Recommendation and Plan  Outcome Summary/Treatment Plan (OT)  Daily Summary of Progress (OT): progress toward functional goals is good  Daily Summary of Progress (OT): progress toward functional goals is good  Plan of Care Review  Plan of Care Reviewed With: patient  Plan of Care Reviewed With: patient  Outcome Summary: pt completed AROM and theraband ex sitting EOB. pt was independent w. bed mobility. Pt did not want to wash up yet today. Pt cont to benefit from OT to incr ADLs, strength, and endurance.  Outcome Measures     Row Name 02/12/20 1311 02/10/20 1400          How much help from another is currently needed...    Putting on and taking off regular lower body clothing?  3  -KP  3  -RD     Bathing (including washing, rinsing, and drying)  3  -KP  3  -RD     Toileting (which includes using toilet bed pan or urinal)  3  -KP  3  -RD     Putting on and taking off regular upper body clothing  3  -KP  3  -RD     Taking care of personal grooming (such as brushing teeth)  3  -KP  3  -RD     Eating meals  4  -KP  4  -RD     AM-PAC 6 Clicks Score (OT)  19  -KP  19  -RD        Functional Assessment    Outcome Measure Options  AM-PAC 6 Clicks Daily Activity (OT)  -KP  AM-PAC 6 Clicks Daily Activity (OT)  -RD       User Key  (r) = Recorded By, (t) = Taken By, (c) = Cosigned By    Initials Name Provider Type    Malini Anderson, OTR Occupational Therapist    Wanda Garcia OT  Occupational Therapist           Time Calculation:   Time Calculation- OT     Row Name 02/12/20 1311             Time Calculation- OT    OT Start Time  0855  -      OT Stop Time  0907  -      OT Time Calculation (min)  12 min  -      Total Timed Code Minutes- OT  12 minute(s)  -      OT Received On  02/12/20  -        User Key  (r) = Recorded By, (t) = Taken By, (c) = Cosigned By    Initials Name Provider Type     Malini Gonzales OTR Occupational Therapist        Therapy Charges for Today     Code Description Service Date Service Provider Modifiers Qty    [id number]  OT THER PROC EA 15 MIN 2/12/2020 Malini Gonzales OTR GO 1               CHARBEL Germain  2/12/2020

## 2020-02-13 ENCOUNTER — ANESTHESIA (OUTPATIENT)
Dept: GASTROENTEROLOGY | Facility: HOSPITAL | Age: 60
End: 2020-02-13

## 2020-02-13 ENCOUNTER — ANESTHESIA EVENT (OUTPATIENT)
Dept: GASTROENTEROLOGY | Facility: HOSPITAL | Age: 60
End: 2020-02-13

## 2020-02-13 LAB
ANION GAP SERPL CALCULATED.3IONS-SCNC: 17.1 MMOL/L (ref 5–15)
BASOPHILS # BLD AUTO: 0.06 10*3/MM3 (ref 0–0.2)
BASOPHILS NFR BLD AUTO: 0.8 % (ref 0–1.5)
BUN BLD-MCNC: 5 MG/DL (ref 8–23)
BUN/CREAT SERPL: 5.7 (ref 7–25)
CALCIUM SPEC-SCNC: 8.5 MG/DL (ref 8.6–10.5)
CHLORIDE SERPL-SCNC: 97 MMOL/L (ref 98–107)
CO2 SERPL-SCNC: 15.9 MMOL/L (ref 22–29)
CREAT BLD-MCNC: 0.87 MG/DL (ref 0.57–1)
DEPRECATED RDW RBC AUTO: 49.2 FL (ref 37–54)
EOSINOPHIL # BLD AUTO: 0.11 10*3/MM3 (ref 0–0.4)
EOSINOPHIL NFR BLD AUTO: 1.5 % (ref 0.3–6.2)
ERYTHROCYTE [DISTWIDTH] IN BLOOD BY AUTOMATED COUNT: 14.3 % (ref 12.3–15.4)
GFR SERPL CREATININE-BSD FRML MDRD: 66 ML/MIN/1.73
GLUCOSE BLD-MCNC: 113 MG/DL (ref 65–99)
HCT VFR BLD AUTO: 26.1 % (ref 34–46.6)
HGB BLD-MCNC: 8.6 G/DL (ref 12–15.9)
IMM GRANULOCYTES # BLD AUTO: 0.02 10*3/MM3 (ref 0–0.05)
IMM GRANULOCYTES NFR BLD AUTO: 0.3 % (ref 0–0.5)
INR PPP: 1.2 (ref 0.9–1.1)
LYMPHOCYTES # BLD AUTO: 1.35 10*3/MM3 (ref 0.7–3.1)
LYMPHOCYTES NFR BLD AUTO: 18.4 % (ref 19.6–45.3)
MAGNESIUM SERPL-MCNC: 1.6 MG/DL (ref 1.6–2.4)
MCH RBC QN AUTO: 31.5 PG (ref 26.6–33)
MCHC RBC AUTO-ENTMCNC: 33 G/DL (ref 31.5–35.7)
MCV RBC AUTO: 95.6 FL (ref 79–97)
MONOCYTES # BLD AUTO: 0.97 10*3/MM3 (ref 0.1–0.9)
MONOCYTES NFR BLD AUTO: 13.2 % (ref 5–12)
NEUTROPHILS # BLD AUTO: 4.82 10*3/MM3 (ref 1.7–7)
NEUTROPHILS NFR BLD AUTO: 65.8 % (ref 42.7–76)
NRBC BLD AUTO-RTO: 0 /100 WBC (ref 0–0.2)
PLATELET # BLD AUTO: 261 10*3/MM3 (ref 140–450)
PMV BLD AUTO: 9.3 FL (ref 6–12)
POTASSIUM BLD-SCNC: 3.5 MMOL/L (ref 3.5–5.2)
PROTHROMBIN TIME: 15 SECONDS (ref 11.7–14.2)
RBC # BLD AUTO: 2.73 10*6/MM3 (ref 3.77–5.28)
SODIUM BLD-SCNC: 130 MMOL/L (ref 136–145)
WBC NRBC COR # BLD: 7.33 10*3/MM3 (ref 3.4–10.8)

## 2020-02-13 PROCEDURE — 85025 COMPLETE CBC W/AUTO DIFF WBC: CPT | Performed by: INTERNAL MEDICINE

## 2020-02-13 PROCEDURE — 45378 DIAGNOSTIC COLONOSCOPY: CPT | Performed by: INTERNAL MEDICINE

## 2020-02-13 PROCEDURE — 0DB68ZX EXCISION OF STOMACH, VIA NATURAL OR ARTIFICIAL OPENING ENDOSCOPIC, DIAGNOSTIC: ICD-10-PCS | Performed by: INTERNAL MEDICINE

## 2020-02-13 PROCEDURE — 25010000002 PROPOFOL 10 MG/ML EMULSION: Performed by: ANESTHESIOLOGY

## 2020-02-13 PROCEDURE — 83735 ASSAY OF MAGNESIUM: CPT | Performed by: INTERNAL MEDICINE

## 2020-02-13 PROCEDURE — 88305 TISSUE EXAM BY PATHOLOGIST: CPT | Performed by: INTERNAL MEDICINE

## 2020-02-13 PROCEDURE — 25010000002 MIDAZOLAM PER 1 MG: Performed by: ANESTHESIOLOGY

## 2020-02-13 PROCEDURE — 80048 BASIC METABOLIC PNL TOTAL CA: CPT | Performed by: INTERNAL MEDICINE

## 2020-02-13 PROCEDURE — 43239 EGD BIOPSY SINGLE/MULTIPLE: CPT | Performed by: INTERNAL MEDICINE

## 2020-02-13 PROCEDURE — 85610 PROTHROMBIN TIME: CPT | Performed by: INTERNAL MEDICINE

## 2020-02-13 PROCEDURE — 0DJD8ZZ INSPECTION OF LOWER INTESTINAL TRACT, VIA NATURAL OR ARTIFICIAL OPENING ENDOSCOPIC: ICD-10-PCS | Performed by: INTERNAL MEDICINE

## 2020-02-13 PROCEDURE — 25010000002 PHENYLEPHRINE PER 1 ML: Performed by: NURSE ANESTHETIST, CERTIFIED REGISTERED

## 2020-02-13 PROCEDURE — 25010000002 ONDANSETRON PER 1 MG: Performed by: INTERNAL MEDICINE

## 2020-02-13 RX ORDER — SODIUM CHLORIDE, SODIUM LACTATE, POTASSIUM CHLORIDE, CALCIUM CHLORIDE 600; 310; 30; 20 MG/100ML; MG/100ML; MG/100ML; MG/100ML
INJECTION, SOLUTION INTRAVENOUS CONTINUOUS PRN
Status: DISCONTINUED | OUTPATIENT
Start: 2020-02-13 | End: 2020-02-13 | Stop reason: SURG

## 2020-02-13 RX ORDER — MIDAZOLAM HYDROCHLORIDE 1 MG/ML
INJECTION INTRAMUSCULAR; INTRAVENOUS AS NEEDED
Status: DISCONTINUED | OUTPATIENT
Start: 2020-02-13 | End: 2020-02-13 | Stop reason: SURG

## 2020-02-13 RX ORDER — PROPOFOL 10 MG/ML
VIAL (ML) INTRAVENOUS CONTINUOUS PRN
Status: DISCONTINUED | OUTPATIENT
Start: 2020-02-13 | End: 2020-02-13 | Stop reason: SURG

## 2020-02-13 RX ORDER — SUCRALFATE 1 G/1
1 TABLET ORAL
Status: DISCONTINUED | OUTPATIENT
Start: 2020-02-13 | End: 2020-02-14 | Stop reason: HOSPADM

## 2020-02-13 RX ORDER — SODIUM CHLORIDE 9 MG/ML
30 INJECTION, SOLUTION INTRAVENOUS CONTINUOUS PRN
Status: DISCONTINUED | OUTPATIENT
Start: 2020-02-13 | End: 2020-02-14 | Stop reason: HOSPADM

## 2020-02-13 RX ORDER — PANTOPRAZOLE SODIUM 40 MG/1
40 TABLET, DELAYED RELEASE ORAL
Status: DISCONTINUED | OUTPATIENT
Start: 2020-02-13 | End: 2020-02-14 | Stop reason: HOSPADM

## 2020-02-13 RX ORDER — PROPOFOL 10 MG/ML
VIAL (ML) INTRAVENOUS AS NEEDED
Status: DISCONTINUED | OUTPATIENT
Start: 2020-02-13 | End: 2020-02-13 | Stop reason: SURG

## 2020-02-13 RX ADMIN — AMLODIPINE BESYLATE 10 MG: 10 TABLET ORAL at 09:05

## 2020-02-13 RX ADMIN — ACETAMINOPHEN 650 MG: 325 TABLET, FILM COATED ORAL at 13:52

## 2020-02-13 RX ADMIN — SODIUM CHLORIDE 30 ML/HR: 9 INJECTION, SOLUTION INTRAVENOUS at 11:54

## 2020-02-13 RX ADMIN — PROPOFOL 100 MCG/KG/MIN: 10 INJECTION, EMULSION INTRAVENOUS at 12:44

## 2020-02-13 RX ADMIN — FAMOTIDINE 20 MG: 20 TABLET, FILM COATED ORAL at 13:54

## 2020-02-13 RX ADMIN — ATORVASTATIN CALCIUM 40 MG: 20 TABLET, FILM COATED ORAL at 20:52

## 2020-02-13 RX ADMIN — PROPOFOL 100 MG: 10 INJECTION, EMULSION INTRAVENOUS at 12:44

## 2020-02-13 RX ADMIN — FERROUS SULFATE TAB 325 MG (65 MG ELEMENTAL FE) 325 MG: 325 (65 FE) TAB at 13:54

## 2020-02-13 RX ADMIN — Medication 5 MG: at 20:52

## 2020-02-13 RX ADMIN — CARVEDILOL 12.5 MG: 12.5 TABLET, FILM COATED ORAL at 09:05

## 2020-02-13 RX ADMIN — CARVEDILOL 12.5 MG: 12.5 TABLET, FILM COATED ORAL at 18:04

## 2020-02-13 RX ADMIN — PANTOPRAZOLE SODIUM 40 MG: 40 TABLET, DELAYED RELEASE ORAL at 18:04

## 2020-02-13 RX ADMIN — ONDANSETRON 4 MG: 2 INJECTION INTRAMUSCULAR; INTRAVENOUS at 01:18

## 2020-02-13 RX ADMIN — PHENYLEPHRINE HYDROCHLORIDE 100 MCG: 10 INJECTION INTRAVENOUS at 13:06

## 2020-02-13 RX ADMIN — MIDAZOLAM 1 MG: 1 INJECTION INTRAMUSCULAR; INTRAVENOUS at 12:43

## 2020-02-13 RX ADMIN — ACETAMINOPHEN 650 MG: 325 TABLET, FILM COATED ORAL at 01:14

## 2020-02-13 RX ADMIN — SODIUM CHLORIDE, POTASSIUM CHLORIDE, SODIUM LACTATE AND CALCIUM CHLORIDE: 600; 310; 30; 20 INJECTION, SOLUTION INTRAVENOUS at 12:03

## 2020-02-13 RX ADMIN — SUCRALFATE 1 G: 1 TABLET ORAL at 18:04

## 2020-02-13 RX ADMIN — ASPIRIN 81 MG: 81 TABLET, COATED ORAL at 09:05

## 2020-02-13 NOTE — ANESTHESIA POSTPROCEDURE EVALUATION
"Patient: Marylin Wilcox    Procedure Summary     Date:  02/13/20 Room / Location:  [facility] ENDOSCOPY 4 /  [facility]    Anesthesia Start:  1239 Anesthesia Stop:  1313    Procedures:       COLONOSCOPY to cecum and into terminal ileum (N/A )      ESOPHAGOGASTRODUODENOSCOPY with cold biopsies (N/A Esophagus) Diagnosis:       Iron deficiency anemia, unspecified iron deficiency anemia type      Heme positive stool      (Iron deficiency anemia, unspecified iron deficiency anemia type [D50.9])      (Heme positive stool [R19.5])    Surgeon:  Justin Boudreaux MD Provider:  Kan De Jesus MD    Anesthesia Type:  MAC ASA Status:  3          Anesthesia Type: MAC    Vitals  Vitals Value Taken Time   /84 2/13/2020  1:22 PM   Temp     Pulse 73 2/13/2020  1:22 PM   Resp 16 2/13/2020  1:22 PM   SpO2 100 % 2/13/2020  1:22 PM           Post Anesthesia Care and Evaluation    Patient location during evaluation: PACU  Patient participation: complete - patient participated  Level of consciousness: awake  Pain score: 0  Pain management: adequate  Airway patency: patent  Anesthetic complications: No anesthetic complications  PONV Status: none  Cardiovascular status: acceptable  Respiratory status: acceptable  Hydration status: acceptable    Comments: /84 (BP Location: Left arm, Patient Position: Sitting)   Pulse 73   Temp 36.9 °C (98.4 °F) (Oral)   Resp 16   Ht 170.2 cm (67\")   Wt 72.6 kg (160 lb)   SpO2 100%   BMI 25.06 kg/m²       "

## 2020-02-13 NOTE — RESEARCH
Diarrhea [place] in Adults in [place] Attributable to Clostridioides Difficile: An Active Surveillance Study in [place] IRB # [id number]    Visited patient at bedside and explained the purpose of the above research study.  Assessed patient eligibility and understanding of the study and adequate time was permitted to answer any questions from the patient.  Patient agreed to participate and signed the informed consent prior to any study related procedures.  A copy of the consent with the original version of the revocation form was given to the patient. We will continue follow up with patient as per protocol requirements. If you have any questions about this research study, please contact the study team at [phone].

## 2020-02-13 NOTE — ANESTHESIA PREPROCEDURE EVALUATION
Anesthesia Evaluation     Patient summary reviewed and Nursing notes reviewed   no history of anesthetic complications:  NPO Solid Status: > 8 hours  NPO Liquid Status: > 4 hours           Airway   Mallampati: II  Dental      Pulmonary - normal exam   (+) a smoker Former,   Cardiovascular - normal exam    (+) hypertension 2 medications or greater,       Neuro/Psych  (+) CVA, syncope, psychiatric history Depression,     GI/Hepatic/Renal/Endo    (+)  GERD, GI bleeding lower , hepatitis C,     Musculoskeletal     Abdominal    Substance History   (+) alcohol use,      OB/GYN          Other   arthritis,                      Anesthesia Plan    ASA 3     MAC     intravenous induction     Anesthetic plan, all risks, benefits, and alternatives have been provided, discussed and informed consent has been obtained with: patient.

## 2020-02-13 NOTE — PROGRESS NOTES
Name: Marylin Wilcox ADMIT: 2020   : 1960  PCP: Provider, No Known    MRN: [id number] LOS: 3 days   AGE/SEX: 59 y.o. female  ROOM: [facility]/   Subjective   Chief Complaint   Patient presents with   • Alcohol Intoxication   • Fall     unwitnessed     Difficulty with sleep last night which is not surprising giving her chronic alcohol abuse and fact that she had a bowel prep overnight.  She reports no abdominal pain.  No chest pain or shortness of breath.    Objective   Vital Signs  Temp:  [97.5 °F (36.4 °C)-98.4 °F (36.9 °C)] 97.8 °F (36.6 °C)  Heart Rate:  [80-91] 91  Resp:  [18-20] 20  BP: (113-145)/(66-99) 113/66  SpO2:  [98 %-99 %] 98 %  on   ;   Device (Oxygen Therapy): room air  Body mass index is 25.06 kg/m².    Physical Exam   Constitutional: She appears well-developed. No distress.   frail   HENT:   Head: Atraumatic.   Nose: Nose normal.   Eyes: Conjunctivae and EOM are normal.   Neck: Neck supple. No tracheal deviation present.   Cardiovascular: Normal rate and regular rhythm.   Pulmonary/Chest: Effort normal. She has no wheezes.   Abdominal: Soft. She exhibits no distension. There is no tenderness. There is no rebound and no guarding.   Musculoskeletal:   Left knee with crepitus and she reports chronic swelling compared to the right. Abrasion right forearm.   Neurological: She is alert. No cranial nerve deficit.   Skin: Skin is warm and dry. She is not diaphoretic.   Psychiatric: She has a normal mood and affect. Her behavior is normal.   Nursing note and vitals reviewed.      Results Review:       I reviewed the patient's new clinical results.     I reviewed imaging, agree with interpretation.        Results from last 7 days   Lab Units 20  0456 20  0522 20  1031 02/10/20  1749 02/10/20  0403 20  0840   WBC 10*3/mm3 7.33 5.22  --   --  4.29 7.03   HEMOGLOBIN g/dL 8.6* 8.4* 8.5* 7.9* 7.3* 9.2*   PLATELETS 10*3/mm3 261 280  --   --  231 351     Results from last 7 days    Lab Units 02/13/20  0456 02/12/20  0522 02/10/20  2212 02/10/20  0403   SODIUM mmol/L 130* 136 135* 138   POTASSIUM mmol/L 3.5 3.5 4.1  4.0 3.2*   CHLORIDE mmol/L 97* 102 102 105   CO2 mmol/L 15.9* 17.9* 17.5* 18.3*   BUN mg/dL 5* 4* 4* 7*   CREATININE mg/dL 0.87 0.95 0.78 0.81   GLUCOSE mg/dL 113* 97 103* 123*   Estimated Creatinine Clearance: 78.8 mL/min (by C-G formula based on SCr of 0.87 mg/dL).  Results from last 7 days   Lab Units 02/13/20  0456 02/12/20  0522 02/10/20  2212 02/10/20  0403 02/08/20  0840   CALCIUM mg/dL 8.5* 9.2 8.8 8.3* 8.7   ALBUMIN g/dL  --  3.80  --   --  4.50   MAGNESIUM mg/dL 1.6  --  1.6 1.6 2.7*         amLODIPine 10 mg Oral Daily   aspirin 81 mg Oral Daily   atorvastatin 40 mg Oral Nightly   carvedilol 12.5 mg Oral BID With Meals   DULoxetine 60 mg Oral Daily   famotidine 20 mg Oral BID AC   ferrous sulfate 325 mg Oral Daily With Breakfast   folic acid 1 mg Oral Daily   magnesium oxide 400 mg Oral Daily   melatonin 5 mg Oral Nightly   thiamine 100 mg Oral Daily      NPO Diet    Assessment/Plan      Active Hospital Problems    Diagnosis  POA   • **Alcohol withdrawal syndrome without complication (CMS/HCC) [F10.230]  Yes   • Essential hypertension [I10]  Yes   • Osteoarthritis of left knee [M17.12]  Yes   • Alcohol withdrawal (CMS/HCC) [F10.239]  Yes   • Fall [W19.XXXA]  Yes   • Iron deficiency anemia [D50.9]  Unknown   • Heme positive stool [R19.5]  Unknown      Resolved Hospital Problems   No resolved problems to display.       · Alcohol Intoxication/Withdrawal: Continue vitamin supplementation. No acute withdrawal. Access evaluated.  · Right Elbow Fracture: Chronic fracture with new contusion. Appreciate orthopedic evaluation.  · OA Left Knee: Follow up with home orthopedist.  · Fall  · Fe Def Anemia: Hgb has stabilized and no overt GI losses. Continue oral iron replacement.  Possibility of cirrhotic change on previous imaging.  Ultrasound liver here shows normal echogenicity  compared to her kidney.  Planned endoscopy today.  GI following.  · Dispostion: Return to [facility], if doing well after endoscopy can be discharged today.    Stanislav Gonzalez MD  [facility]  02/13/20  11:10 AM    Dictated portions using Dragon dictation software.

## 2020-02-13 NOTE — PROGRESS NOTES
Met w/ pt. She has a friend with her. She denies any need for additional resources.  Possible discharge later today if thins go well will ENDO.  Will sign off.

## 2020-02-13 NOTE — PLAN OF CARE
Problem: Patient Care Overview  Goal: Plan of Care Review  Outcome: Ongoing (interventions implemented as appropriate)  Flowsheets (Taken 2/13/2020 1723)  Progress: improving  Plan of Care Reviewed With: patient  Outcome Summary: Pt sister at bedside when patient returned from her procedures today. Attending to patient's needs. Patient awake, alert, oriented and states her doctor told her about her scope results. VSS. Likely to be discharged tomorrow  Goal: Individualization and Mutuality  Outcome: Ongoing (interventions implemented as appropriate)  Goal: Discharge Needs Assessment  Outcome: Ongoing (interventions implemented as appropriate)  Goal: Interprofessional Rounds/Family Conf  Outcome: Ongoing (interventions implemented as appropriate)

## 2020-02-14 VITALS
DIASTOLIC BLOOD PRESSURE: 95 MMHG | WEIGHT: 160 LBS | BODY MASS INDEX: 25.11 KG/M2 | RESPIRATION RATE: 16 BRPM | OXYGEN SATURATION: 100 % | HEART RATE: 93 BPM | HEIGHT: 67 IN | TEMPERATURE: 98.4 F | SYSTOLIC BLOOD PRESSURE: 127 MMHG

## 2020-02-14 LAB
CYTO UR: NORMAL
HCV RNA SERPL NAA+PROBE-ACNC: NORMAL IU/ML
LAB AP CASE REPORT: NORMAL
PATH REPORT.FINAL DX SPEC: NORMAL
PATH REPORT.GROSS SPEC: NORMAL
TEST INFORMATION: NORMAL

## 2020-02-14 PROCEDURE — 99232 SBSQ HOSP IP/OBS MODERATE 35: CPT | Performed by: INTERNAL MEDICINE

## 2020-02-14 RX ORDER — PANTOPRAZOLE SODIUM 40 MG/1
40 TABLET, DELAYED RELEASE ORAL
Qty: 60 TABLET | Refills: 0 | Status: SHIPPED | OUTPATIENT
Start: 2020-02-14

## 2020-02-14 RX ORDER — SUCRALFATE 1 G/1
1 TABLET ORAL
Qty: 60 TABLET | Refills: 0 | Status: SHIPPED | OUTPATIENT
Start: 2020-02-14

## 2020-02-14 RX ADMIN — FOLIC ACID 1 MG: 1 TABLET ORAL at 08:11

## 2020-02-14 RX ADMIN — Medication 100 MG: at 08:11

## 2020-02-14 RX ADMIN — DULOXETINE HYDROCHLORIDE 60 MG: 60 CAPSULE, DELAYED RELEASE ORAL at 08:11

## 2020-02-14 RX ADMIN — PANTOPRAZOLE SODIUM 40 MG: 40 TABLET, DELAYED RELEASE ORAL at 06:30

## 2020-02-14 RX ADMIN — FERROUS SULFATE TAB 325 MG (65 MG ELEMENTAL FE) 325 MG: 325 (65 FE) TAB at 08:11

## 2020-02-14 RX ADMIN — Medication 400 MG: at 08:11

## 2020-02-14 RX ADMIN — AMLODIPINE BESYLATE 10 MG: 10 TABLET ORAL at 08:11

## 2020-02-14 RX ADMIN — CARVEDILOL 12.5 MG: 12.5 TABLET, FILM COATED ORAL at 08:11

## 2020-02-14 RX ADMIN — SUCRALFATE 1 G: 1 TABLET ORAL at 06:30

## 2020-02-14 RX ADMIN — ASPIRIN 81 MG: 81 TABLET, COATED ORAL at 08:11

## 2020-02-14 NOTE — PROGRESS NOTES
Case Management Discharge Note      Final Note: home to assisted living [facility]    Provided post acute provider list?: N/A    Destination - Selection Complete      Service Provider Request Status Selected Services Address Phone Number Fax Number    [facility] Selected Assisted Living [address] [address] [phone]      Durable Medical Equipment      No service has been selected for the patient.      Dialysis/Infusion      No service has been selected for the patient.      Home Medical Care      No service has been selected for the patient.      Therapy      No service has been selected for the patient.      Community Resources      No service has been selected for the patient.        Transportation Services  Private: Car    Final Discharge Disposition Code: 01 - home or self-care

## 2020-02-14 NOTE — PLAN OF CARE
Problem: Patient Care Overview  Goal: Plan of Care Review  Outcome: Ongoing (interventions implemented as appropriate)  Flowsheets (Taken 2/14/2020 0207)  Progress: improving  Plan of Care Reviewed With: patient  Outcome Summary: plan to discharge today. pt sleeping well overnight. denies pain or discomfort. alert and oriented and pleasant. placed on 2 L O2 nc overnight while pt was sleeping--O2 sats were dropping to <88%. will continue to monitor.  Goal: Individualization and Mutuality  Outcome: Ongoing (interventions implemented as appropriate)  Goal: Discharge Needs Assessment  Outcome: Ongoing (interventions implemented as appropriate)  Goal: Interprofessional Rounds/Family Conf  Outcome: Ongoing (interventions implemented as appropriate)     Problem: Fall Risk (Adult)  Goal: Absence of Fall  Outcome: Ongoing (interventions implemented as appropriate)  Flowsheets (Taken 2/14/2020 0207)  Absence of Fall: making progress toward outcome     Problem: Skin Injury Risk (Adult)  Goal: Skin Health and Integrity  Outcome: Ongoing (interventions implemented as appropriate)  Flowsheets (Taken 2/14/2020 0207)  Skin Health and Integrity: making progress toward outcome

## 2020-02-14 NOTE — THERAPY DISCHARGE NOTE
Date of Admission: 2/8/2020  Date of Discharge:  2/14/2020  Primary Care Physician: Provider, No Known     Discharge Diagnosis:  Active Hospital Problems    Diagnosis  POA   • **Alcohol withdrawal syndrome without complication (CMS/HCC) [F10.230]  Yes   • Essential hypertension [I10]  Yes   • Osteoarthritis of left knee [M17.12]  Yes   • Alcohol withdrawal (CMS/HCC) [F10.239]  Yes   • Fall [W19.XXXA]  Yes   • Iron deficiency anemia [D50.9]  Yes   • Heme positive stool [R19.5]  Yes      Resolved Hospital Problems   No resolved problems to display.       Presenting Problem/History of Present Illness:  Left hemiparesis (CMS/HCC) [G81.94]  Fall, initial encounter [W19.XXXA]  Elbow fracture, right, closed, initial encounter [S42.401A]  Acute alcoholic intoxication in alcoholism with complication (CMS/HCC) [F10.229]  Alcohol withdrawal (CMS/HCC) [F10.239]     59 year old female was brought in after a fall at home; she was found on the floor; she was confused and had a bottle of vodka; she is unsure of what happened and is still mildly confused; she has a history of prior hemorrhagic stroke and gi bleeding; she also has a history of alcohol dependence; two sisters are at the bedside; the patient lives in an independent living facility and has had four falls within the last week per family; the patient does not remember this;    Hospital Course:  The patient is a 59 y.o. female who presented with acute alcohol intoxication subsequent fall and contusion of her chronic right elbow fracture.  She was admitted and orthopedic surgery was consulted.  She was found to have chronic fracture with a new contusion but not acute fracture.  She also has chronic left knee osteoarthritis for which she sees outpatient orthopedist and gets intermittent injections.  Orthopedic surgery is recommended that she follow-up with her home orthopedist for monitoring.    She did have alcohol withdrawal while here and was given vitamin  supplementation and as needed Ativan.  She is out of acute withdrawal now and she was seen by [facility].  She should abstain from alcohol in the future.    She is found to have iron deficiency anemia and her hemoglobin stabilized and she did not have any overt GI losses.  There was a question of cirrhotic change on previous abdominal imaging.  Iron saturation was low and FOBT was positive.  Gastroenterology was consulted.  Repeat ultrasound of her liver showed normal echogenicity.  She underwent upper and lower endoscopies today.  This showed gastritis and gastric ulcer disease.  Is recommended that she take twice daily Protonix and Carafate.  She should follow-up with GI in clinic to review biopsy results.     Exam Today:  Constitutional: She appears well-developed. No distress.   frail   HENT:   Head: Atraumatic.   Nose: Nose normal.   Eyes: Conjunctivae and EOM are normal.   Neck: Neck supple. No tracheal deviation present.   Cardiovascular: Normal rate and regular rhythm.   Pulmonary/Chest: Effort normal. She has no wheezes.   Abdominal: Soft. She exhibits no distension. There is no tenderness. There is no rebound and no guarding.   Musculoskeletal:   Left knee with crepitus and she reports chronic swelling compared to the right. Abrasion right forearm.   Neurological: She is alert. No cranial nerve deficit.   Skin: Skin is warm and dry. She is not diaphoretic.   Psychiatric: She has a normal mood and affect. Her behavior is normal.     Results:  Pathology Exam: [id number]   Order: [id number]   Status:  Final result   Visible to patient:  No (Not Released) Next appt:  None Dx:  Heme positive stool; Iron deficiency ...   Specimen Information: Stomach; Tissue        Component    Case Report   Surgical Pathology Report                         Case: [id number]                                   Authorizing Provider:  Justin Boudreaux MD        Collected:           02/13/2020 12:48 PM           Ordering Location:      [facility]  Received:            02/13/2020 02:12 PM                                  ENDO SUITES                                                                   Pathologist:           Ramona Lambert MD                                                     Specimen:    Stomach, gastric ulcer biopsies                                                            Final Diagnosis   1.  Stomach, Biopsy:                A.  Erosive gastropathy and repair.                B.  Negative for Helicobacter by routine staining.                C.  No metaplasia, dysplasia or malignancy identified.             CT Head  The CT scan was performed as an emergency procedure through  the brain without contrast. There is mild diffuse atrophy and chronic  small vessel ischemic change. There is a localized area of  encephalomalacia in the right parietal region most consistent with old  infarct. There is no evidence of acute hemorrhage or mass effect and the  visualized sinuses are clear.    CT Right Elbow  1. There is a fracture of the posterior half of the olecranon with  several fracture fragments extending posteriorly and superiorly with the  largest fracture fragment measuring up to 2.1 x 2.7 cm.  2. There may also be a minimal fracture involving the medial margin of  the medial humeral epicondyle. There is some associated joint effusion  or hemarthrosis present.    US Liver  No discrete liver lesions are identified. No biliary ductal dilatation.  If there is further clinical concern, enhanced liver imaging could be  considered for further evaluation.    Procedures Performed:  Procedure(s):  COLONOSCOPY to cecum and into terminal ileum  ESOPHAGOGASTRODUODENOSCOPY with cold biopsies  02/13 1238 UPPER GI ENDOSCOPY  02/13 1237 COLONOSCOPY    EGD  - LA Grade C reflux esophagitis.  - Non-bleeding gastric ulcer with no stigmata of bleeding. Biopsied.  - Multiple non-bleeding duodenal ulcers with no stigmata of  bleeding.  - Await pathology results.  - Please call my office for pathology results in 2 weeks if we have not called you to review results.  - please avoid NSAIDs and aspirin if possible as gastric ulceration, gastritis, gastric erosions are caused by these  types of medications.  - if biopsies are positive for H. pylori she will require antibiotic therapy.  - Use a proton pump inhibitor PO BID.  - Use sucralfate tablets 1 gram PO BID for 4 weeks.  - Return to nurse practitioner in 4 weeks.  - Repeat upper endoscopy in 3 months to check healing, to assess disease activity and to evaluate the response  to therapy.    Colonoscopy  - The examined portion of the ileum was normal.  - Diverticulosis in the sigmoid colon and in the descending colon.  - Non-bleeding internal hemorrhoids.  - No specimens collected.  - [facility] patient to [facility] for [facility] care.  - Return to my office in 4 weeks.    Consults:   Consults     Date and Time Order Name Status Description    2/11/2020 1451 Inpatient Gastroenterology Consult Completed     2/8/2020 0942 Ortho (on-call MD unless specified) Completed     2/8/2020 0924 [facility] (on-call MD unless specified) Details Completed            Discharge Disposition:  Home or Self Care    Discharge Medications:     Discharge Medications      New Medications      Instructions Start Date   pantoprazole 40 MG EC tablet  Commonly known as:  PROTONIX   40 mg, Oral, 2 Times Daily Before Meals      sucralfate 1 g tablet  Commonly known as:  CARAFATE   1 g, Oral, 2 Times Daily Before Meals         Continue These Medications      Instructions Start Date   acetaminophen 325 MG tablet  Commonly known as:  TYLENOL   650 mg, Oral, Every 6 Hours PRN      amLODIPine 10 MG tablet  Commonly known as:  NORVASC   10 mg, Oral, Daily      aspirin 81 MG EC tablet   81 mg, Oral, Daily      atorvastatin 40 MG tablet  Commonly known as:  LIPITOR   40 mg, Oral, Nightly      carvedilol 12.5 MG tablet  Commonly known  as:  COREG   12.5 mg, Oral, 2 Times Daily With Meals      cetirizine 5 MG tablet  Commonly known as:  zyrTEC   5 mg, Oral, Daily      dextromethorphan-guaifenesin  MG/5ML syrup  Commonly known as:  ROBITUSSIN-DM   10 mL, Oral, 4 Times Daily PRN      DULoxetine 60 MG capsule  Commonly known as:  CYMBALTA   60 mg, Oral, Daily      ferrous sulfate 325 (65 FE) MG tablet   325 mg, Oral, Daily With Breakfast      folic acid 1 MG tablet  Commonly known as:  FOLVITE   1 mg, Oral, Daily      magnesium oxide 400 tablet tablet  Commonly known as:  MAG-OX   400 mg, Oral, Daily      melatonin 1 MG tablet   1 mg, Oral, Nightly      MIRALAX PO   17 g, Oral, Daily PRN      MULTIVITAMIN ADULT PO   Oral, Daily      nitroglycerin 0.4 MG SL tablet  Commonly known as:  NITROSTAT   0.4 mg, Sublingual, Every 5 Minutes PRN, Take no more than 3 doses in 15 minutes.      ondansetron 4 MG tablet  Commonly known as:  ZOFRAN   4 mg, Oral, Every 6 Hours PRN      thiamine 100 MG tablet  Commonly known as:  VITAMIN B-1   100 mg, Oral, Daily      vitamin C 500 MG tablet  Commonly known as:  ASCORBIC ACID   500 mg, Oral, Daily         Stop These Medications    famotidine 40 MG tablet  Commonly known as:  PEPCID            Discharge Diet:   Diet Instructions     Advance Diet As Tolerated            Activity at Discharge:   Activity Instructions     Activity as Tolerated            Follow-up Appointments:  Follow-up Information     Provider, No Known Follow up.    Contact information:  [facility] [address] [phone]             Justin Boudreaux MD Follow up.    Specialty:  Gastroenterology  Contact information:  [address] [place], [place] [id number] [phone]                   Test Results Pending at Discharge:  [unfilled]     Stanislav Gonzalez MD  02/14/20  11:53 AM    Time Spent on Discharge Activities: >30 minutes    Dictated portions using Dragon dictation software.

## 2020-02-14 NOTE — PROGRESS NOTES
Continued Stay Note  [facility]     Patient Name: Marylin Wilcox  MRN: [id number]  Today's Date: 2/14/2020    Admit Date: 2/8/2020    Discharge Plan     Row Name 02/14/20 1407       Plan    Plan  [facility] assisted [facility]    Plan Comments  Spoke Jw at [facility] and he accepts back.  Anticipate DC today.  Family transport..........................Sherice Abbott RN        Discharge Codes    No documentation.       Expected Discharge Date and Time     Expected Discharge Date Expected Discharge Time    Feb 14, 2020             Sherice Abbott RN

## 2020-02-14 NOTE — PROGRESS NOTES
Pathology benign  Office visit Liudmila 4 weeks to make sure she is on Carafate and PPI  EGD recall 3 months

## 2020-02-14 NOTE — DISCHARGE SUMMARY
Date of Admission: 2/8/2020  Date of Discharge:  2/14/2020  Primary Care Physician: Provider, No Known     Discharge Diagnosis:  Active Hospital Problems    Diagnosis  POA   • **Alcohol withdrawal syndrome without complication (CMS/HCC) [F10.230]  Yes   • Essential hypertension [I10]  Yes   • Osteoarthritis of left knee [M17.12]  Yes   • Alcohol withdrawal (CMS/HCC) [F10.239]  Yes   • Fall [W19.XXXA]  Yes   • Iron deficiency anemia [D50.9]  Yes   • Heme positive stool [R19.5]  Yes      Resolved Hospital Problems   No resolved problems to display.       Presenting Problem/History of Present Illness:  Left hemiparesis (CMS/HCC) [G81.94]  Fall, initial encounter [W19.XXXA]  Elbow fracture, right, closed, initial encounter [S42.401A]  Acute alcoholic intoxication in alcoholism with complication (CMS/HCC) [F10.229]  Alcohol withdrawal (CMS/HCC) [F10.239]     59 year old female was brought in after a fall at home; she was found on the floor; she was confused and had a bottle of vodka; she is unsure of what happened and is still mildly confused; she has a history of prior hemorrhagic stroke and gi bleeding; she also has a history of alcohol dependence; two sisters are at the bedside; the patient lives in an independent living facility and has had four falls within the last week per family; the patient does not remember this;    Hospital Course:  The patient is a 59 y.o. female who presented with acute alcohol intoxication subsequent fall and contusion of her chronic right elbow fracture.  She was admitted and orthopedic surgery was consulted.  She was found to have chronic fracture with a new contusion but not acute fracture.  She also has chronic left knee osteoarthritis for which she sees outpatient orthopedist and gets intermittent injections.  Orthopedic surgery is recommended that she follow-up with her home orthopedist for monitoring.    She did have alcohol withdrawal while here and was given vitamin  supplementation and as needed Ativan.  She is out of acute withdrawal now and she was seen by [facility].  She should abstain from alcohol in the future.    She is found to have iron deficiency anemia and her hemoglobin stabilized and she did not have any overt GI losses.  There was a question of cirrhotic change on previous abdominal imaging.  Iron saturation was low and FOBT was positive.  Gastroenterology was consulted.  Repeat ultrasound of her liver showed normal echogenicity.  She underwent upper and lower endoscopies today.  This showed gastritis and gastric ulcer disease.  Is recommended that she take twice daily Protonix and Carafate.  She should follow-up with GI in clinic to review biopsy results.     Exam Today:  Constitutional: She appears well-developed. No distress.   frail   HENT:   Head: Atraumatic.   Nose: Nose normal.   Eyes: Conjunctivae and EOM are normal.   Neck: Neck supple. No tracheal deviation present.   Cardiovascular: Normal rate and regular rhythm.   Pulmonary/Chest: Effort normal. She has no wheezes.   Abdominal: Soft. She exhibits no distension. There is no tenderness. There is no rebound and no guarding.   Musculoskeletal:   Left knee with crepitus and she reports chronic swelling compared to the right. Abrasion right forearm.   Neurological: She is alert. No cranial nerve deficit.   Skin: Skin is warm and dry. She is not diaphoretic.   Psychiatric: She has a normal mood and affect. Her behavior is normal.     Results:  Pathology Exam: [id number]   Order: [id number]   Status:  Final result   Visible to patient:  No (Not Released) Next appt:  None Dx:  Heme positive stool; Iron deficiency ...   Specimen Information: Stomach; Tissue        Component    Case Report   Surgical Pathology Report                         Case: [id number]                                   Authorizing Provider:  Justin Boudreaux MD        Collected:           02/13/2020 12:48 PM           Ordering Location:      [facility]  Received:            02/13/2020 02:12 PM                                  ENDO SUITES                                                                   Pathologist:           Ramona Lambert MD                                                     Specimen:    Stomach, gastric ulcer biopsies                                                            Final Diagnosis   1.  Stomach, Biopsy:                A.  Erosive gastropathy and repair.                B.  Negative for Helicobacter by routine staining.                C.  No metaplasia, dysplasia or malignancy identified.             CT Head  The CT scan was performed as an emergency procedure through  the brain without contrast. There is mild diffuse atrophy and chronic  small vessel ischemic change. There is a localized area of  encephalomalacia in the right parietal region most consistent with old  infarct. There is no evidence of acute hemorrhage or mass effect and the  visualized sinuses are clear.    CT Right Elbow  1. There is a fracture of the posterior half of the olecranon with  several fracture fragments extending posteriorly and superiorly with the  largest fracture fragment measuring up to 2.1 x 2.7 cm.  2. There may also be a minimal fracture involving the medial margin of  the medial humeral epicondyle. There is some associated joint effusion  or hemarthrosis present.    US Liver  No discrete liver lesions are identified. No biliary ductal dilatation.  If there is further clinical concern, enhanced liver imaging could be  considered for further evaluation.    Procedures Performed:  Procedure(s):  COLONOSCOPY to cecum and into terminal ileum  ESOPHAGOGASTRODUODENOSCOPY with cold biopsies  02/13 1238 UPPER GI ENDOSCOPY  02/13 1237 COLONOSCOPY    EGD  - LA Grade C reflux esophagitis.  - Non-bleeding gastric ulcer with no stigmata of bleeding. Biopsied.  - Multiple non-bleeding duodenal ulcers with no stigmata of  bleeding.  - Await pathology results.  - Please call my office for pathology results in 2 weeks if we have not called you to review results.  - please avoid NSAIDs and aspirin if possible as gastric ulceration, gastritis, gastric erosions are caused by these  types of medications.  - if biopsies are positive for H. pylori she will require antibiotic therapy.  - Use a proton pump inhibitor PO BID.  - Use sucralfate tablets 1 gram PO BID for 4 weeks.  - Return to nurse practitioner in 4 weeks.  - Repeat upper endoscopy in 3 months to check healing, to assess disease activity and to evaluate the response  to therapy.    Colonoscopy  - The examined portion of the ileum was normal.  - Diverticulosis in the sigmoid colon and in the descending colon.  - Non-bleeding internal hemorrhoids.  - No specimens collected.  - Return patient to hospital [facility] for ongoing care.  - Return to my office in 4 weeks.    Consults:   Consults     Date and Time Order Name Status Description    2/11/2020 1451 Inpatient Gastroenterology Consult Completed     2/8/2020 0942 Ortho (on-call MD unless specified) Completed     2/8/2020 0924 [facility] (on-call MD unless specified) Details Completed            Discharge Disposition:  Home or Self Care    Discharge Medications:     Discharge Medications      New Medications      Instructions Start Date   pantoprazole 40 MG EC tablet  Commonly known as:  PROTONIX   40 mg, Oral, 2 Times Daily Before Meals      sucralfate 1 g tablet  Commonly known as:  CARAFATE   1 g, Oral, 2 Times Daily Before Meals         Continue These Medications      Instructions Start Date   acetaminophen 325 MG tablet  Commonly known as:  TYLENOL   650 mg, Oral, Every 6 Hours PRN      amLODIPine 10 MG tablet  Commonly known as:  NORVASC   10 mg, Oral, Daily      aspirin 81 MG EC tablet   81 mg, Oral, Daily      atorvastatin 40 MG tablet  Commonly known as:  LIPITOR   40 mg, Oral, Nightly      carvedilol 12.5 MG tablet  Commonly known  as:  COREG   12.5 mg, Oral, 2 Times Daily With Meals      cetirizine 5 MG tablet  Commonly known as:  zyrTEC   5 mg, Oral, Daily      dextromethorphan-guaifenesin  MG/5ML syrup  Commonly known as:  ROBITUSSIN-DM   10 mL, Oral, 4 Times Daily PRN      DULoxetine 60 MG capsule  Commonly known as:  CYMBALTA   60 mg, Oral, Daily      ferrous sulfate 325 (65 FE) MG tablet   325 mg, Oral, Daily With Breakfast      folic acid 1 MG tablet  Commonly known as:  FOLVITE   1 mg, Oral, Daily      magnesium oxide 400 tablet tablet  Commonly known as:  MAG-OX   400 mg, Oral, Daily      melatonin 1 MG tablet   1 mg, Oral, Nightly      MIRALAX PO   17 g, Oral, Daily PRN      MULTIVITAMIN ADULT PO   Oral, Daily      nitroglycerin 0.4 MG SL tablet  Commonly known as:  NITROSTAT   0.4 mg, Sublingual, Every 5 Minutes PRN, Take no more than 3 doses in 15 minutes.      ondansetron 4 MG tablet  Commonly known as:  ZOFRAN   4 mg, Oral, Every 6 Hours PRN      thiamine 100 MG tablet  Commonly known as:  VITAMIN B-1   100 mg, Oral, Daily      vitamin C 500 MG tablet  Commonly known as:  ASCORBIC ACID   500 mg, Oral, Daily         Stop These Medications    famotidine 40 MG tablet  Commonly known as:  PEPCID            Discharge Diet:   Diet Instructions     Advance Diet As Tolerated            Activity at Discharge:   Activity Instructions     Activity as Tolerated            Follow-up Appointments:   Contact information for follow-up providers     Provider, No Known Follow up.    Contact information:  [facility] [address] [phone]             Justin Boudreaux MD Follow up.    Specialty:  Gastroenterology  Contact information:  [address] [id number] [phone]                   Contact information for after-discharge care     Destination     [facility] .    Service:  Assisted Living  Contact information:  [address] [facility]  [address]  [phone]                             Test Results Pending at Discharge:  [unfilled]     Stanislav Gonzalez MD  02/14/20  3:50 PM    Time Spent on Discharge Activities: >30 minutes    Dictated portions using Dragon dictation software.

## 2020-02-14 NOTE — PROGRESS NOTES
[facility]  Inpatient Progress Note    Reason for Follow Up: Heme positive anemia    Subjective     Interval History:   EGD and colon results reviewed with patient, esophagitis peptic ulcer disease    Current Facility-Administered Medications:   •  acetaminophen (TYLENOL) tablet 650 mg, 650 mg, Oral, Q6H PRN, Justin Boudreaux MD, 650 mg at 02/13/20 1352  •  amLODIPine (NORVASC) tablet 10 mg, 10 mg, Oral, Daily, Justin Boudreaux MD, 10 mg at 02/14/20 0811  •  aspirin EC tablet 81 mg, 81 mg, Oral, Daily, Justin Boudreaux MD, 81 mg at 02/14/20 0811  •  atorvastatin (LIPITOR) tablet 40 mg, 40 mg, Oral, Nightly, Justin Boudreaux MD, 40 mg at 02/13/20 2052  •  carvedilol (COREG) tablet 12.5 mg, 12.5 mg, Oral, BID With Meals, Justin Boudreaux MD, 12.5 mg at 02/14/20 0811  •  DULoxetine (CYMBALTA) DR capsule 60 mg, 60 mg, Oral, Daily, Justin Boudreaux MD, 60 mg at 02/14/20 0811  •  ferrous sulfate tablet 325 mg, 325 mg, Oral, Daily With Breakfast, Justin Boudreaux MD, 325 mg at 02/14/20 0811  •  folic acid (FOLVITE) tablet 1 mg, 1 mg, Oral, Daily, Justin Boudreaux MD, 1 mg at 02/14/20 0811  •  hydrALAZINE (APRESOLINE) tablet 25 mg, 25 mg, Oral, Q6H PRN, Justin Boudreaux MD  •  LORazepam (ATIVAN) tablet 1 mg, 1 mg, Oral, Q2H PRN **OR** LORazepam (ATIVAN) injection 1 mg, 1 mg, Intravenous, Q2H PRN **OR** LORazepam (ATIVAN) tablet 2 mg, 2 mg, Oral, Q1H PRN, 2 mg at 02/09/20 0612 **OR** LORazepam (ATIVAN) injection 2 mg, 2 mg, Intravenous, Q1H PRN **OR** LORazepam (ATIVAN) injection 2 mg, 2 mg, Intravenous, Q15 Min PRN **OR** LORazepam (ATIVAN) injection 2 mg, 2 mg, Intramuscular, Q15 Min PRN, Justin Boudreaux MD  •  magnesium oxide (MAG-OX) tablet 400 mg, 400 mg, Oral, Daily, Justin Boudreaux MD, 400 mg at 02/14/20 0811  •  Magnesium Sulfate 2 gram Bolus, followed by 8 gram infusion (total Mg dose 10 grams)- Mg less than or equal to 1mg/dL, 2 g, Intravenous, PRN **OR** Magnesium Sulfate 2 gram / 50mL  Infusion (GIVE X 3 BAGS TO EQUAL 6GM TOTAL DOSE) - Mg 1.1 - 1.5 mg/dl, 2 g, Intravenous, PRN **OR** Magnesium Sulfate 4 gram infusion- Mg 1.6-1.9 mg/dL, 4 g, Intravenous, PRN, Justin Boudreaux MD  •  melatonin tablet 5 mg, 5 mg, Oral, Nightly, Justin Boudreaux MD, 5 mg at 02/13/20 2052  •  nitroglycerin (NITROSTAT) SL tablet 0.4 mg, 0.4 mg, Sublingual, Q5 Min PRN, Justin Boudreaux MD  •  ondansetron (ZOFRAN) tablet 4 mg, 4 mg, Oral, Q6H PRN **OR** ondansetron (ZOFRAN) injection 4 mg, 4 mg, Intravenous, Q6H PRN, Justin Boudreaux MD, 4 mg at 02/13/20 0118  •  ondansetron (ZOFRAN) tablet 4 mg, 4 mg, Oral, Q6H PRN, Justin Boudreaux MD  •  pantoprazole (PROTONIX) EC tablet 40 mg, 40 mg, Oral, BID AC, Justin Boudreaux MD, 40 mg at 02/14/20 0630  •  polyethylene glycol (MIRALAX) powder 17 g, 17 g, Oral, Daily PRN, Justin Boudreaux MD  •  potassium chloride (MICRO-K) CR capsule 40 mEq, 40 mEq, Oral, PRN, 40 mEq at 02/10/20 1821 **OR** potassium chloride (KLOR-CON) packet 40 mEq, 40 mEq, Oral, PRN **OR** potassium chloride 10 mEq in 100 mL IVPB, 10 mEq, Intravenous, Q1H PRN, Justin Boudreaux MD  •  [COMPLETED] Insert peripheral IV, , , Once **AND** sodium chloride 0.9 % flush 10 mL, 10 mL, Intravenous, PRN, Justin Boudreaux MD  •  sodium chloride 0.9 % infusion, 30 mL/hr, Intravenous, Continuous PRN, Justin Boudreaux MD, Stopped at 02/13/20 1317  •  sucralfate (CARAFATE) tablet 1 g, 1 g, Oral, BID AC, Justin Boudreaux MD, 1 g at 02/14/20 0630  •  thiamine (VITAMIN B-1) tablet 100 mg, 100 mg, Oral, Daily, Justin Boudreaux MD, 100 mg at 02/14/20 0811  Review of Systems:    She has weakness other systems reviewed and negative    Objective     Vital Signs  Temp:  [97.9 °F (36.6 °C)-98.6 °F (37 °C)] 97.9 °F (36.6 °C)  Heart Rate:  [72-90] 85  Resp:  [15-20] 16  BP: (122-143)/(77-96) 140/92  Body mass index is 25.06 kg/m².    Intake/Output Summary (Last 24 hours) at 2/14/2020 0843  Last data filed at 2/13/2020 2052  Gross per 24  hour   Intake 1380 ml   Output --   Net 1380 ml     No intake/output data recorded.     Physical Exam:   General: patient awake, alert and cooperative   Eyes: Normal lids and lashes, no scleral icterus   Neck: supple, normal ROM   Skin: warm and dry, not jaundiced   Cardiovascular: regular rhythm and rate, no murmurs auscultated   Pulm: clear to auscultation bilaterally, regular and unlabored   Abdomen: soft, nontender, nondistended; normal bowel sounds   Extremities: no rash or edema   Psychiatric: Normal mood and behavior; memory intact     Results Review:     I reviewed the patient's new clinical results.    Results from last 7 days   Lab Units 02/13/20  0456 02/12/20  0522 02/11/20  1031  02/10/20  0403   WBC 10*3/mm3 7.33 5.22  --   --  4.29   HEMOGLOBIN g/dL 8.6* 8.4* 8.5*   < > 7.3*   HEMATOCRIT % 26.1* 25.5* 26.4*   < > 22.7*   PLATELETS 10*3/mm3 261 280  --   --  231    < > = values in this interval not displayed.     Results from last 7 days   Lab Units 02/13/20 0456 02/12/20  0522 02/10/20  2212  02/08/20  0840   SODIUM mmol/L 130* 136 135*   < > 141   POTASSIUM mmol/L 3.5 3.5 4.1  4.0   < > 4.0   CHLORIDE mmol/L 97* 102 102   < > 105   CO2 mmol/L 15.9* 17.9* 17.5*   < > 20.1*   BUN mg/dL 5* 4* 4*   < > 12   CREATININE mg/dL 0.87 0.95 0.78   < > 1.13*   CALCIUM mg/dL 8.5* 9.2 8.8   < > 8.7   BILIRUBIN mg/dL  --  0.3  --   --  0.2   ALK PHOS U/L  --  79  --   --  97   ALT (SGPT) U/L  --  9  --   --  10   AST (SGOT) U/L  --  20  --   --  23   GLUCOSE mg/dL 113* 97 103*   < > 109*    < > = values in this interval not displayed.     Results from last 7 days   Lab Units 02/13/20 0456 02/08/20  0840   INR  1.20* 1.23*     Lab Results   Lab Value Date/Time    LIPASE 233 10/04/2019 0217    LIPASE 151 09/29/2019 0115    LIPASE 400 (H) 09/28/2019 0856       Radiology:  US Liver   Final Result       No discrete liver lesions are identified. No biliary ductal dilatation.   If there is further clinical concern,  enhanced liver imaging could be   considered for further evaluation.                       This report was finalized on 2/12/2020 2:20 PM by Dr. Jeffry Massey M.D.          CT Upper Extremity Right Without Contrast   Final Result      CT Head Without Contrast   Final Result      XR Elbow 2 View Right   Final Result          Assessment/Plan     Patient Active Problem List   Diagnosis   • Fall   • Alcohol withdrawal syndrome without complication (CMS/HCC)   • Essential hypertension   • Osteoarthritis of left knee   • Alcohol withdrawal (CMS/HCC)   • Iron deficiency anemia   • Heme positive stool       Assessment:  1. Iron deficiency anemia, heme positive stool      Plan:  · Esophagitis, duodenal ulcers, gastric ulcers as a source for the anemia  · PPI twice daily  · Carafate  · EGD 2 to 3 months  · Office visit with my nurse practitioner in 2 to 3 weeks  I discussed the patients findings and my recommendations with patient and nursing staff.    Justin Boudreaux MD

## 2020-02-15 ENCOUNTER — READMISSION MANAGEMENT (OUTPATIENT)
Dept: CALL CENTER | Facility: HOSPITAL | Age: 60
End: 2020-02-15

## 2020-03-05 ENCOUNTER — TELEPHONE (OUTPATIENT)
Dept: GASTROENTEROLOGY | Facility: CLINIC | Age: 60
End: 2020-03-05

## 2020-03-05 NOTE — TELEPHONE ENCOUNTER
Called pt and advised of the note from Dr Boudreaux. Advised she can keep her appt with Liudmila as scheduled March 16th. Pt verb understanding.

## 2020-03-05 NOTE — TELEPHONE ENCOUNTER
----- Message from Justin Boudreaux MD sent at 2/14/2020 12:15 PM EST -----  Pathology benign  Office visit Liudmila 4 weeks to make sure she is on Carafate and PPI  EGD recall 3 months

## 2020-09-16 NOTE — PAYOR COMM NOTE
"Valentina Wilcox (59 y.o. Female)                     ATTENTION;   MARVA HERNANDEZ CONTINUED STAY CLINICALS CASE REF [id number]                REPLY TO [facility] DEPT [facility] N  UR  [phone]         Date of Birth Social Security Number Address Home Phone MRN    1960  [place]  [address] [facility] [address] [phone] [id number]    Latter day Marital Status          None Single       Admission Date Admission Type Admitting Provider Attending Provider Department, Room/Bed    2/8/20 Emergency Soren Cornejo MD Baumann, Patrick D, MD [address], S420/1    Discharge Date Discharge Disposition Discharge Destination                       Attending Provider:  Stanislav Gonzalez MD    Allergies:  Valsartan    Isolation:  None   Infection:  None   Code Status:  CPR    Ht:  170.2 cm (67\")   Wt:  72.6 kg (160 lb)    Admission Cmt:  None   Principal Problem:  Alcohol withdrawal syndrome without complication (CMS/[facility]) [F10.230]                 Active Insurance as of 2/8/2020     Primary Coverage     Payor Plan Insurance Group Employer/Plan Group    [employer] [id number]     Payor Plan Address Payor Plan Phone Number Payor Plan Fax Number Effective Dates    [address] [phone]  11/1/1997 - None Entered    [facility] [phone]       Subscriber Name Subscriber Birth Date Member ID       VALENTINA WILCOX 1960 [id number]                 Emergency Contacts      (Rel.) Home Phone Work Phone Mobile Phone    NANCY WILCOX (Sister) [phone] -- --    NAVEEN ARTEAGA (Son) -- -- [phone]    VALE WILCOX (Sister) -- -- [phone]    MELISSA BARRIOS (Sister) [phone] -- --            Vital Signs (last day)     Date/Time   Temp   Temp src   Pulse   Resp   BP   Patient Position   SpO2    02/14/20 0730   97.9 (36.6)   Oral   85   16   140/92   Lying   100    02/13/20 2326   98.6 (37)   Oral   90   16   143/82   Lying   " --    02/13/20 1931   98.6 (37)   Oral   84   16   127/77   Lying   98    02/13/20 1347   98 (36.7)   Oral   76   16   140/96   Lying   100    02/13/20 1322   --   --   73   16   122/84   Sitting   100    02/13/20 1312   --   --   72   15   125/84   Lying   100    02/13/20 1222   --   --   74   17   124/84   Sitting   100    02/13/20 1146   98.4 (36.9)   Oral   77   20   123/83   Lying   --    02/13/20 0737   97.8 (36.6)   Oral   91   20   113/66   Lying   98              Oxygen Therapy (last day)     Date/Time   SpO2   Device (Oxygen Therapy)   Flow (L/min)   Oxygen Concentration (%)   ETCO2 (mmHg)    02/14/20 0730   100   nasal cannula   --   --   --    02/14/20 0050   --   nasal cannula   2   --   --    02/13/20 2355   --   nasal cannula   2   --   --    02/13/20 2326   --   room air   --   --   --    02/13/20 2052   --   room air   --   --   --    02/13/20 1931   98   room air   --   --   --    02/13/20 1347   100   room air   --   --   --    02/13/20 1322   100   room air   --   --   --    02/13/20 1312   100   nasal cannula   1   --   --    02/13/20 1222   100   room air   --   --   --    02/13/20 1146   --   room air   --   --   --    02/13/20 0737   98   room air   --   --   --              Lines, Drains & Airways    Active LDAs     Name:   Placement date:   Placement time:   Site:   Days:    Peripheral IV 02/08/20 0838 Right Forearm   02/08/20    0838    Forearm   5                CIWA (last day)     Date/Time CIWA-Ar Score    02/13/20 2052  0    02/13/20 1615  0        Current Facility-Administered Medications   Medication Dose Route Frequency Provider Last Rate Last Dose   • acetaminophen (TYLENOL) tablet 650 mg  650 mg Oral Q6H PRN Justin Boudreaux MD   650 mg at 02/13/20 1352   • amLODIPine (NORVASC) tablet 10 mg  10 mg Oral Daily Justin Boudreaux MD   10 mg at 02/13/20 0905   • aspirin EC tablet 81 mg  81 mg Oral Daily Justin Boudreaux MD   81 mg at 02/13/20 0905   • atorvastatin (LIPITOR) tablet 40 mg   40 mg Oral Nightly Justin Boudreaux MD   40 mg at 02/13/20 2052   • carvedilol (COREG) tablet 12.5 mg  12.5 mg Oral BID With Meals Justin Boudreaux MD   12.5 mg at 02/13/20 1804   • DULoxetine (CYMBALTA) DR capsule 60 mg  60 mg Oral Daily Justin Boudreaux MD   60 mg at 02/12/20 0942   • ferrous sulfate tablet 325 mg  325 mg Oral Daily With Breakfast Justin Boudreaux MD   325 mg at 02/13/20 1354   • folic acid (FOLVITE) tablet 1 mg  1 mg Oral Daily Justin Boudreaux MD   1 mg at 02/12/20 0942   • hydrALAZINE (APRESOLINE) tablet 25 mg  25 mg Oral Q6H PRN Justin Boudreaux MD       • LORazepam (ATIVAN) tablet 1 mg  1 mg Oral Q2H PRN Justin Boudreaux MD        Or   • LORazepam (ATIVAN) injection 1 mg  1 mg Intravenous Q2H PRN uJstin Boudreaux MD        Or   • LORazepam (ATIVAN) tablet 2 mg  2 mg Oral Q1H PRN Justin Boudreaux MD   2 mg at 02/09/20 0612    Or   • LORazepam (ATIVAN) injection 2 mg  2 mg Intravenous Q1H PRN Justin Boudreaux MD        Or   • LORazepam (ATIVAN) injection 2 mg  2 mg Intravenous Q15 Min PRN Justin Boudreaux MD        Or   • LORazepam (ATIVAN) injection 2 mg  2 mg Intramuscular Q15 Min PRN Justin Boudreaux MD       • magnesium oxide (MAG-OX) tablet 400 mg  400 mg Oral Daily Justin Boudreaux MD   400 mg at 02/12/20 0942   • Magnesium Sulfate 2 gram Bolus, followed by 8 gram infusion (total Mg dose 10 grams)- Mg less than or equal to 1mg/dL  2 g Intravenous PRN Justin Boudreaux MD        Or   • Magnesium Sulfate 2 gram / 50mL Infusion (GIVE X 3 BAGS TO EQUAL 6GM TOTAL DOSE) - Mg 1.1 - 1.5 mg/dl  2 g Intravenous PRN Justin Boudreaux MD        Or   • Magnesium Sulfate 4 gram infusion- Mg 1.6-1.9 mg/dL  4 g Intravenous PRN Justin Boudreaux MD       • melatonin tablet 5 mg  5 mg Oral Nightly Justin Boudreaux MD   5 mg at 02/13/20 2052   • nitroglycerin (NITROSTAT) SL tablet 0.4 mg  0.4 mg Sublingual Q5 Min PRN Justin Boudreaux MD       • ondansetron (ZOFRAN) tablet 4 mg  4 mg Oral Q6H PRN Justin Boudreaux  MD ARNOLDO        Or   • ondansetron (ZOFRAN) injection 4 mg  4 mg Intravenous Q6H PRN Justin Boudreaux MD   4 mg at 02/13/20 0118   • ondansetron (ZOFRAN) tablet 4 mg  4 mg Oral Q6H PRN Justin Boudreaux MD       • pantoprazole (PROTONIX) EC tablet 40 mg  40 mg Oral BID Justin Sim MD   40 mg at 02/14/20 0630   • polyethylene glycol (MIRALAX) powder 17 g  17 g Oral Daily PRN Justin Boudreaux MD       • potassium chloride (MICRO-K) CR capsule 40 mEq  40 mEq Oral PRN Justin Boudreaux MD   40 mEq at 02/10/20 1821    Or   • potassium chloride (KLOR-CON) packet 40 mEq  40 mEq Oral PRN Justin Boudreaux MD        Or   • potassium chloride 10 mEq in 100 mL IVPB  10 mEq Intravenous Q1H PRN Justin Boudreaux MD       • sodium chloride 0.9 % flush 10 mL  10 mL Intravenous PRN Justin Boudreaux MD       • sodium chloride 0.9 % infusion  30 mL/hr Intravenous Continuous PRN Justin Boudreaux MD   Stopped at 02/13/20 1317   • sucralfate (CARAFATE) tablet 1 g  1 g Oral BID AC Justin Boudreaux MD   1 g at 02/14/20 0630   • thiamine (VITAMIN B-1) tablet 100 mg  100 mg Oral Daily Justin Boudreaux MD   100 mg at 02/12/20 0942     Orders (last 24 hrs)      Start     Ordered    02/13/20 1730  sucralfate (CARAFATE) tablet 1 g  2 Times Daily Before Meals      02/13/20 1311    02/13/20 1730  pantoprazole (PROTONIX) EC tablet 40 mg  2 Times Daily Before Meals      02/13/20 1311    02/13/20 1312  Advance Diet As Tolerated  Until Discontinued      02/13/20 1311    02/13/20 1248  Tissue Pathology Exam      02/13/20 1248    02/13/20 1140  Implement Anesthesia orders day of procedure.  Once,   Status:  Canceled      02/13/20 1139    02/13/20 1140  Verify bowel prep was successful  Once,   Status:  Canceled      02/13/20 1139    02/13/20 1140  Give tap water enema if bowel prep was insufficient  Once,   Status:  Canceled      02/13/20 1139    02/13/20 1139  sodium chloride 0.9 % infusion  Continuous PRN      02/13/20 1139    02/12/20 2100   melatonin tablet 5 mg  Nightly      02/12/20 1020    02/10/20 1811  hydrALAZINE (APRESOLINE) tablet 25 mg  Every 6 Hours PRN      02/10/20 1813    02/10/20 0858  Magnesium Sulfate 2 gram Bolus, followed by 8 gram infusion (total Mg dose 10 grams)- Mg less than or equal to 1mg/dL  As Needed      02/10/20 0858    02/10/20 0858  Magnesium Sulfate 2 gram / 50mL Infusion (GIVE X 3 BAGS TO EQUAL 6GM TOTAL DOSE) - Mg 1.1 - 1.5 mg/dl  As Needed      02/10/20 0858    02/10/20 0858  Magnesium Sulfate 4 gram infusion- Mg 1.6-1.9 mg/dL  As Needed      02/10/20 0858    02/10/20 0858  potassium chloride (MICRO-K) CR capsule 40 mEq  As Needed      02/10/20 0858    02/10/20 0858  potassium chloride (KLOR-CON) packet 40 mEq  As Needed      02/10/20 0858    02/10/20 0858  potassium chloride 10 mEq in 100 mL IVPB  Every 1 Hour PRN      02/10/20 0858    02/09/20 0900  amLODIPine (NORVASC) tablet 10 mg  Daily      02/08/20 2149 02/09/20 0900  aspirin EC tablet 81 mg  Daily      02/08/20 2149 02/09/20 0900  DULoxetine (CYMBALTA) DR capsule 60 mg  Daily      02/08/20 2149 02/09/20 0900  folic acid (FOLVITE) tablet 1 mg  Daily      02/08/20 2149 02/09/20 0900  magnesium oxide (MAG-OX) tablet 400 mg  Daily      02/08/20 2149 02/09/20 0900  thiamine (VITAMIN B-1) tablet 100 mg  Daily      02/08/20 2149 02/09/20 0800  ferrous sulfate tablet 325 mg  Daily With Breakfast      02/08/20 2149 02/09/20 0730  famotidine (PEPCID) tablet 20 mg  2 Times Daily Before Meals,   Status:  Discontinued      02/08/20 2149 02/08/20 2245  atorvastatin (LIPITOR) tablet 40 mg  Nightly      02/08/20 2149 02/08/20 2245  carvedilol (COREG) tablet 12.5 mg  2 Times Daily With Meals      02/08/20 2149 02/08/20 2148  polyethylene glycol (MIRALAX) powder 17 g  Daily PRN      02/08/20 2149 02/08/20 2148  ondansetron (ZOFRAN) tablet 4 mg  Every 6 Hours PRN      02/08/20 2149 02/08/20 2148  nitroglycerin (NITROSTAT) SL tablet 0.4 mg   Every 5 Minutes PRN      02/08/20 2149 02/08/20 2148  acetaminophen (TYLENOL) tablet 650 mg  Every 6 Hours PRN      02/08/20 2149 02/08/20 1717  ondansetron (ZOFRAN) tablet 4 mg  Every 6 Hours PRN      02/08/20 1719 02/08/20 1717  ondansetron (ZOFRAN) injection 4 mg  Every 6 Hours PRN      02/08/20 1719 02/08/20 1717  LORazepam (ATIVAN) tablet 1 mg  Every 2 Hours PRN      02/08/20 1719 02/08/20 1717  LORazepam (ATIVAN) injection 1 mg  Every 2 Hours PRN      02/08/20 1719 02/08/20 1717  LORazepam (ATIVAN) tablet 2 mg  Every 1 Hour PRN      02/08/20 1719 02/08/20 1717  LORazepam (ATIVAN) injection 2 mg  Every 1 Hour PRN      02/08/20 1719 02/08/20 1717  LORazepam (ATIVAN) injection 2 mg  Every 15 Minutes PRN      02/08/20 1719 02/08/20 1717  LORazepam (ATIVAN) injection 2 mg  Every 15 Minutes PRN      02/08/20 1719 02/08/20 0800  sodium chloride 0.9 % flush 10 mL  As Needed      02/08/20 0801    Unscheduled  Magnesium  As Needed      02/10/20 0858    Unscheduled  Potassium  As Needed      02/10/20 0858    --  amLODIPine (NORVASC) 10 MG tablet  Daily      02/08/20 1041    --  aspirin 81 MG EC tablet  Daily      02/08/20 1041    --  cetirizine (zyrTEC) 5 MG tablet  Daily      02/08/20 1041    --  DULoxetine (CYMBALTA) 60 MG capsule  Daily      02/08/20 1041    --  ferrous sulfate 325 (65 FE) MG tablet  Daily With Breakfast      02/08/20 1041    --  folic acid (FOLVITE) 1 MG tablet  Daily      02/08/20 1041    --  magnesium oxide (MAG-OX) 400 tablet tablet  Daily      02/08/20 1041    --  Multiple Vitamins-Minerals (MULTIVITAMIN ADULT PO)  Daily      02/08/20 1041    --  vitamin C (ASCORBIC ACID) 500 MG tablet  Daily      02/08/20 1041    --  carvedilol (COREG) 12.5 MG tablet  2 Times Daily With Meals      02/08/20 1041    --  famotidine (PEPCID) 40 MG tablet  2 Times Daily      02/08/20 1041    --  thiamine (VITAMIN B-1) 100 MG tablet  Daily      02/08/20 1041    --  atorvastatin (LIPITOR)  40 MG tablet  Nightly      20 1041    --  acetaminophen (TYLENOL) 325 MG tablet  Every 6 Hours PRN      20 1041    --  Polyethylene Glycol 3350 (MIRALAX PO)  Daily PRN      20 1041    --  nitroglycerin (NITROSTAT) 0.4 MG SL tablet  Every 5 Minutes PRN      20 1041    --  ondansetron (ZOFRAN) 4 MG tablet  Every 6 Hours PRN      20 1041    --  melatonin 1 MG tablet  Nightly      20 1057    --  dextromethorphan-guaifenesin (ROBITUSSIN-DM)  MG/5ML syrup  4 Times Daily PRN      20 1711    --  UPPER GI ENDOSCOPY      20 1238    --  COLONOSCOPY      20 1237                   Physician Progress Notes (last 24 hours)       Stanislav Gonzalez MD at 20 1110              Name: Marylin Wilcox ADMIT: 2020   : 1960  PCP: Richelle, No Known    MRN: [id number] LOS: 3 days   AGE/SEX: 59 y.o. female  ROOM: [facility]   Subjective   Chief Complaint   Patient presents with   • Alcohol Intoxication   • Fall     unwitnessed     Difficulty with sleep last night which is not surprising giving her chronic alcohol abuse and fact that she had a bowel prep overnight.  She reports no abdominal pain.  No chest pain or shortness of breath.    Objective   Vital Signs  Temp:  [97.5 °F (36.4 °C)-98.4 °F (36.9 °C)] 97.8 °F (36.6 °C)  Heart Rate:  [80-91] 91  Resp:  [18-20] 20  BP: (113-145)/(66-99) 113/66  SpO2:  [98 %-99 %] 98 %  on   ;   Device (Oxygen Therapy): room air  Body mass index is 25.06 kg/m².    Physical Exam   Constitutional: She appears well-developed. No distress.   frail   HENT:   Head: Atraumatic.   Nose: Nose normal.   Eyes: Conjunctivae and EOM are normal.   Neck: Neck supple. No tracheal deviation present.   Cardiovascular: Normal rate and regular rhythm.   Pulmonary/Chest: Effort normal. She has no wheezes.   Abdominal: Soft. She exhibits no distension. There is no tenderness. There is no rebound and no guarding.   Musculoskeletal:   Left knee with crepitus  and she reports chronic swelling compared to the right. Abrasion right forearm.   Neurological: She is alert. No cranial nerve deficit.   Skin: Skin is warm and dry. She is not diaphoretic.   Psychiatric: She has a normal mood and affect. Her behavior is normal.   Nursing note and vitals reviewed.      Results Review:       I reviewed the patient's new clinical results.     I reviewed imaging, agree with interpretation.        Results from last 7 days   Lab Units 02/13/20 0456 02/12/20 0522 02/11/20  1031 02/10/20  1749 02/10/20  0403 02/08/20  0840   WBC 10*3/mm3 7.33 5.22  --   --  4.29 7.03   HEMOGLOBIN g/dL 8.6* 8.4* 8.5* 7.9* 7.3* 9.2*   PLATELETS 10*3/mm3 261 280  --   --  231 351     Results from last 7 days   Lab Units 02/13/20  0456 02/12/20  0522 02/10/20  2212 02/10/20  0403   SODIUM mmol/L 130* 136 135* 138   POTASSIUM mmol/L 3.5 3.5 4.1  4.0 3.2*   CHLORIDE mmol/L 97* 102 102 105   CO2 mmol/L 15.9* 17.9* 17.5* 18.3*   BUN mg/dL 5* 4* 4* 7*   CREATININE mg/dL 0.87 0.95 0.78 0.81   GLUCOSE mg/dL 113* 97 103* 123*   Estimated Creatinine Clearance: 78.8 mL/min (by C-G formula based on SCr of 0.87 mg/dL).  Results from last 7 days   Lab Units 02/13/20  0456 02/12/20  0522 02/10/20  2212 02/10/20  0403 02/08/20  0840   CALCIUM mg/dL 8.5* 9.2 8.8 8.3* 8.7   ALBUMIN g/dL  --  3.80  --   --  4.50   MAGNESIUM mg/dL 1.6  --  1.6 1.6 2.7*         amLODIPine 10 mg Oral Daily   aspirin 81 mg Oral Daily   atorvastatin 40 mg Oral Nightly   carvedilol 12.5 mg Oral BID With Meals   DULoxetine 60 mg Oral Daily   famotidine 20 mg Oral BID AC   ferrous sulfate 325 mg Oral Daily With Breakfast   folic acid 1 mg Oral Daily   magnesium oxide 400 mg Oral Daily   melatonin 5 mg Oral Nightly   thiamine 100 mg Oral Daily      NPO Diet    Assessment/Plan     Active Hospital Problems    Diagnosis  POA   • **Alcohol withdrawal syndrome without complication (CMS/HCC) [F10.230]  Yes   • Essential hypertension [I10]  Yes   •  Osteoarthritis of left knee [M17.12]  Yes   • Alcohol withdrawal (CMS/HCC) [F10.239]  Yes   • Fall [W19.XXXA]  Yes   • Iron deficiency anemia [D50.9]  Unknown   • Heme positive stool [R19.5]  Unknown      Resolved Hospital Problems   No resolved problems to display.       · Alcohol Intoxication/Withdrawal: Continue vitamin supplementation. No acute withdrawal. Access evaluated.  · Right Elbow Fracture: Chronic fracture with new contusion. Appreciate orthopedic evaluation.  · OA Left Knee: Follow up with home orthopedist.  · Fall  · Fe Def Anemia: Hgb has stabilized and no overt GI losses. Continue oral iron replacement.  Possibility of cirrhotic change on previous imaging.  Ultrasound liver here shows normal echogenicity compared to her kidney.  Planned endoscopy today.  GI following.  · Dispostion: Return to [facility], if doing well after endoscopy can be discharged today.    Stanislav Gonzalez MD  [facility]  02/13/20  11:10 AM    Dictated portions using Dragon dictation software.    Electronically signed by Stanislav Gonzalez MD at 02/13/20 1112     Patient Name: Marylin Wilcox Procedure Date: 2/13/2020 12:38 PM  MRN: [id number] Account Number: [id number]  YOB: 1960 Admit Type: Inpatient  Age: 59 Gender: Female  Attending MD: Justin Boudreaux MD  - Await pathology results.  - Please call my office for pathology results in 2 weeks if we have not called you to review results.  - please avoid NSAIDs and aspirin if possible as gastric ulceration, gastritis, gastric erosions are caused by these  types of medications.  - if biopsies are positive for H. pylori she will require antibiotic therapy.  - Use a proton pump inhibitor PO BID.  - Use sucralfate tablets 1 gram PO BID for 4 weeks.  - Return to nurse practitioner in 4 weeks.  - Repeat upper endoscopy in 3 months to check healing, to assess disease activity and to evaluate the response  to therapy.  Number of Addenda:  Note Initiated  On:  --- Professional ---  00508, Esophagogastroduodenoscopy, flexible, transoral; with biopsy, single or multiple  Procedure Code(s):  --- Professional ---  K21.0, Gastro-esophageal reflux disease with esophagitis  K25.9, Gastric ulcer, unspecified as acute or chronic, without hemorrhage or perforation  K26.9, Duodenal ulcer, unspecified as acute or chronic, without hemorrhage or perforation  D50.9, Iron deficiency anemia, unspecified  Diagnosis Code(s):  CPT          Marylin Wilcox Procedure Date: 2/13/2020 12:37 PM  MRN: [id number] Account Number: [id number]  YOB: 1960 Admit Type: Inpatient  Age: 59 Gender: Female  Attending MD: Justin Boudreaux MD  - Return patient to hospital [facility] for ongoing care.  - Return to my office in 4 weeks.  Recommendation:  Number of Addenda:  Note Initiated On:  Scope Withdrawal Time: 7 Minutes 54 Seconds  Total Procedure Duration: 12 Minutes 11 Seconds  --- Professional ---  44685, Colonoscopy, flexible; diagnostic, including collection of specimen(s) by brushing or washing, when  performed (separate procedure)  Procedure Code(s):  --- Professional ---  K64.0, First degree hemorrhoids  D50.9, Iron deficiency anemia, unspecified  K57.30, Diverticulosis of large intestine without perforation or abscess       for Marylin Wilcox as of 2/12/20 through 2/14/20     1 Day 3 Days 7 Days 10 Days < Today >    Legend:                          Discontinued    Completed    Future    MAR Hold     Other Encounter    Linked           Medications 02/12/20 02/13/20 02/14/20   acetaminophen (TYLENOL) tablet 1,000 mg   Dose: 1,000 mg  Freq: Once Route: PO  Start: 02/08/20 0926 End: 02/08/20 0942    Admin Instructions:   Do not exceed 4 grams of acetaminophen in a 24 hr period.    If given for pain, use the following pain scale:   Mild Pain = Pain Score of 1-3, CPOT 1-2  Moderate Pain = Pain Score of 4-6, CPOT 3-4  Severe Pain = Pain Score of 7-10, CPOT 5-8         amLODIPine (NORVASC)  tablet 10 mg   Dose: 10 mg  Freq: Daily Route: PO  Start: 02/09/20 0900    Admin Instructions:   Caution: Look alike/sound alike drug alert. Avoid grapefruit juice.    0942 0905          0900            aspirin EC tablet 81 mg   Dose: 81 mg  Freq: Daily Route: PO  Start: 02/09/20 0900    Admin Instructions:   Herbal/drug interaction: Avoid use with ginkgo biloba. Do not crush or chew.  Do not exceed 4 grams of aspirin in a 24 hr period.    If given for pain, use the following pain scale:   Mild Pain = Pain Score of 1-3, CPOT 1-2  Moderate Pain = Pain Score of 4-6, CPOT 3-4  Severe Pain = Pain Score of 7-10, CPOT 5-8    0942          0905          0900            atorvastatin (LIPITOR) tablet 40 mg   Dose: 40 mg  Freq: Nightly Route: PO  Start: 02/08/20 2245    Admin Instructions:   Avoid grapefruit juice.    2208 2052 2100            bisacodyl (DULCOLAX) EC tablet 20 mg   Dose: 20 mg  Freq: Once Route: PO  Start: 02/12/20 1630 End: 02/12/20 1805    Admin Instructions:   Swallow whole. Do not crush, split, or chew tablet.    1805 [C]              carvedilol (COREG) tablet 12.5 mg   Dose: 12.5 mg  Freq: 2 Times Daily With Meals Route: PO  Start: 02/08/20 2245    Admin Instructions:   Give with food.    0942 [C]   (8326) [C]        0927   1804        0800   1800          DULoxetine (CYMBALTA) DR capsule 60 mg   Dose: 60 mg  Freq: Daily Route: PO  Start: 02/09/20 0900    Admin Instructions:   Caution: Look alike/sound alike drug alert Do not crush or chew capsule.    0942          (8818)          0900            famotidine (PEPCID) tablet 20 mg   Dose: 20 mg  Freq: 2 Times Daily Before Meals Route: PO  Start: 02/09/20 0730 End: 02/13/20 1401    0942 [C]   1800 5250   1401-D/C'd         ferrous sulfate tablet 325 mg   Dose: 325 mg  Freq: Daily With Breakfast Route: PO  Start: 02/09/20 0800    Admin Instructions:   Swallow whole. Do not crush, split, or chew. Take with food if GI upset  occurs.    0943 [C]          1354          0800            folic acid (FOLVITE) tablet 1 mg   Dose: 1 mg  Freq: Daily Route: PO  Start: 02/09/20 0900    0942          (7933)          0900            magnesium oxide (MAG-OX) tablet 400 mg   Dose: 400 mg  Freq: Daily Route: PO  Start: 02/09/20 0900    Admin Instructions:   Each 400mg of magnesium oxide is equal to 241.3mg of elemental magnesium.    0942          (7343)          0900            melatonin tablet 1 mg   Dose: 1 mg  Freq: Nightly Route: PO  Start: 02/08/20 2245 End: 02/12/20 1020    1020-D/C'd            melatonin tablet 5 mg   Dose: 5 mg  Freq: Nightly Route: PO  Start: 02/12/20 2100 2208 2052 2100            multiple vitamin (M.V.I. Adult) 10 mL, thiamine (B-1) 100 mg, folic acid 1 mg, magnesium sulfate 2 g in sodium chloride 0.9 % 1,000 mL infusion   Dose: 1,000 mL/hr  Freq: Once Route: IV  Last Dose: Stopped (02/08/20 1041)  Start: 02/08/20 0803 End: 02/08/20 1041         pantoprazole (PROTONIX) EC tablet 40 mg   Dose: 40 mg  Freq: 2 Times Daily Before Meals Route: PO  Start: 02/13/20 1730    Admin Instructions:   Swallow whole; do not crush, split, or chew.     1804          0630   1730          polyethylene glycol (MIRALAX) powder 238 g   Dose: 238 g  Freq: Once Route: PO  Start: 02/12/20 1515 End: 02/12/20 1806    Admin Instructions:   Mix with 4 16 oz. Bottles of gatorade - give 1/2 solution at 5 pm, give 2nd half of the solution at 11 pm with prep to be completed by 2 am at the latest    1806 [C]              sucralfate (CARAFATE) tablet 1 g   Dose: 1 g  Freq: 2 Times Daily Before Meals Route: PO  Start: 02/13/20 1730    Admin Instructions:   For nasogastric administration:  1. Remove the cap and plunger from a 60 mL syringe  2. Place the sucralfate tablet inside the syringe  3. Replace the plunger so that minimal airspace exists around the tablet  4. Draw up approximately 20 mL water into the syringe  5. Replace the syringe  cap  6. Allow the syringe to stand for ~5 minutes, shaking occasionally  7. Shake the suspension and administer directly from the syringe into the tube  **Flush tube before and after administration**     4230 [id number]          thiamine (VITAMIN B-1) tablet 100 mg   Dose: 100 mg  Freq: Daily Route: PO  Start: 02/09/20 0900    0942          (4585)          0900            Medications 02/12/20 02/13/20 02/14/20       Continuous Meds Sorted by Name   for Marylin Wilcox as of 2/12/20 through 2/14/20    Legend:                          Discontinued    Completed    Future    MAR Hold     Other Encounter    Linked           Medications 02/12/20 02/13/20 02/14/20   lactated ringers infusion   Freq: Continuous PRN Route: IV  Start: 02/13/20 1203 End: 02/13/20 1313     1203   1312   1313-D/C'd       Propofol (DIPRIVAN) injection   Freq: Continuous PRN  Last Dose: 100 mcg/kg/min (02/13/20 1244)  Start: 02/13/20 1244 End: 02/13/20 1313     1244   1313-D/C'd         sodium chloride 0.9 % infusion   Rate: 30 mL/hr Dose: 30 mL/hr  Freq: Continuous PRN Route: IV  PRN Comment: Start Prior to Procedure  Last Dose: Stopped (02/13/20 1317)  Start: 02/13/20 1139     1154   1317           sodium chloride 0.9 % infusion   Rate: 100 mL/hr Dose: 100 mL/hr  Freq: Continuous Route: IV  Last Dose: 100 mL/hr (02/11/20 1137)  Start: 02/08/20 1815 End: 02/11/20 1216             PRN Meds Sorted by Name   for Etienneinmynor Marylin as of 2/12/20 through 2/14/20    Legend:                          Discontinued    Completed    Future    MAR Hold     Other Encounter    Linked           Medications 02/12/20 02/13/20 02/14/20   acetaminophen (TYLENOL) tablet 650 mg   Dose: 650 mg  Freq: Every 6 Hours PRN Route: PO  PRN Reason: Mild Pain   Start: 02/08/20 8589    Admin Instructions:   Do not exceed 4 grams of acetaminophen in a 24 hr period.    If given for pain, use the following pain scale:   Mild Pain = Pain Score of 1-3, CPOT 1-2  Moderate Pain =  Pain Score of 4-6, CPOT 3-4  Severe Pain = Pain Score of 7-10, CPOT 5-8     0114   4712           hydrALAZINE (APRESOLINE) tablet 25 mg   Dose: 25 mg  Freq: Every 6 Hours PRN Route: PO  PRN Comment: prn for systolic greater than 180  Start: 02/10/20 1811    Admin Instructions:   Administer if systolic BP is greater than 180  Caution: Look alike/sound alike drug alert         lactated ringers infusion   Freq: Continuous PRN Route: IV  Start: 02/13/20 1203 End: 02/13/20 1313     1203   1312   1313-D/C'd       LORazepam (ATIVAN) tablet 1 mg   Dose: 1 mg  Freq: Every 2 Hours PRN Route: PO  PRN Reason: Withdrawal  PRN Comment: For CIWA-Ar 8-10  Start: 02/08/20 1717 End: 02/18/20 1716    Admin Instructions:   Reassess 2 Hours After Administration   Caution: Look alike/sound alike drug alert         Or  LORazepam (ATIVAN) injection 1 mg   Dose: 1 mg  Freq: Every 2 Hours PRN Route: IV  PRN Reason: Withdrawal  PRN Comment: For CIWA-Ar 8-10  Start: 02/08/20 1717 End: 02/18/20 1716    Admin Instructions:   Reassess 2 Hours After Administration   Caution: Look alike/sound alike drug alert         Or  LORazepam (ATIVAN) tablet 2 mg   Dose: 2 mg  Freq: Every 1 Hour PRN Route: PO  PRN Reason: Withdrawal  PRN Comment: For CIWA-Ar 11-15  Start: 02/08/20 1717 End: 02/18/20 1716    Admin Instructions:   Reassess 1 Hour After Administration   Caution: Look alike/sound alike drug alert         Or  LORazepam (ATIVAN) injection 2 mg   Dose: 2 mg  Freq: Every 1 Hour PRN Route: IV  PRN Reason: Withdrawal  PRN Comment: For CIWA-Ar 11-15  Start: 02/08/20 1717 End: 02/18/20 1716    Admin Instructions:   Reassess 1 Hour After Administration   Caution: Look alike/sound alike drug alert         Or  LORazepam (ATIVAN) injection 2 mg   Dose: 2 mg  Freq: Every 15 Minutes PRN Route: IV  PRN Reason: Anxiety  PRN Comment: For CIWA-Ar Greater Than 15.  Repeat Dose in 15 Minutes if CIWA-Ar Does Not Decrease  Start: 02/08/20 1717 End: 02/18/20 1712     Admin Instructions:   Reassess 15 Minutes After Each Administration.  If CIWA-Ar Does Not Decrease Contact Provider To Discuss Transfer to Higher Level of Care.   Caution: Look alike/sound alike drug alert         Or  LORazepam (ATIVAN) injection 2 mg   Dose: 2 mg  Freq: Every 15 Minutes PRN Route: IM  PRN Reason: Withdrawal  PRN Comment: If Unable to Administer IV - For CIWA-Ar Greater Than 15.  Repeat Dose in 15 Minutes if CIWA-Ar Does Not Decrease  Start: 02/08/20 1717 End: 02/18/20 1716    Admin Instructions:   Reassess 15 Minutes After Each Administration.  If CIWA-Ar Does Not Decrease Contact Provider To Discuss Transfer to Higher Level of Care.  aution: Look alike/sound alike drug alert         Magnesium Sulfate 2 gram Bolus, followed by 8 gram infusion (total Mg dose 10 grams)- Mg less than or equal to 1mg/dL   Dose: 2 g  Freq: As Needed Route: IV  PRN Comment: See Administration Instructions  Start: 02/10/20 0858    Admin Instructions:   Mg less than or equal to 1mg/dL. Give 2 gm over 30 minutes as bolus, then infuse 2 gm over 2 hours for 4 doses (8 grams) for total dose of 10 grams.  Recheck Mg levels in the AM.         Or  Magnesium Sulfate 2 gram / 50mL Infusion (GIVE X 3 BAGS TO EQUAL 6GM TOTAL DOSE) - Mg 1.1 - 1.5 mg/dl   Dose: 2 g  Freq: As Needed Route: IV  PRN Comment: See Administration Instructions  Start: 02/10/20 0858    Admin Instructions:   Mg 1.1 -1.5 mg/dL. Infuse 2 grams over 2 hours for 3 doses (for a total Mg dose of 6 grams).  Recheck Mg level in the AM.         Or  Magnesium Sulfate 4 gram infusion- Mg 1.6-1.9 mg/dL   Dose: 4 g  Freq: As Needed Route: IV  PRN Comment: See Administration Instructions  Start: 02/10/20 0858    Admin Instructions:   Mg 1.6-1.9 mg/dL. Recheck Mg level in the AM.         midazolam (VERSED) injection   Freq: As Needed Route: IV  Start: 02/13/20 1243 End: 02/13/20 1313     1243   1313-D/C'd         nitroglycerin (NITROSTAT) SL tablet 0.4 mg   Dose: 0.4  mg  Freq: Every 5 Minutes PRN Route: SL  PRN Reason: Chest Pain  Start: 02/08/20 2148    Admin Instructions:   May administer up to 3 doses per episode.         ondansetron (ZOFRAN) tablet 4 mg   Dose: 4 mg  Freq: Every 6 Hours PRN Route: PO  PRN Reasons: Nausea,Vomiting  Start: 02/08/20 1717                Or  ondansetron (ZOFRAN) injection 4 mg   Dose: 4 mg  Freq: Every 6 Hours PRN Route: IV  PRN Reasons: Nausea,Vomiting  Start: 02/08/20 1717     0118             ondansetron (ZOFRAN) tablet 4 mg   Dose: 4 mg  Freq: Every 6 Hours PRN Route: PO  PRN Reasons: Nausea,Vomiting  Start: 02/08/20 2148         phenylephrine (GLENDA-SYNEPHRINE) injection   Freq: As Needed  Start: 02/13/20 1306 End: 02/13/20 1313     1306   1313-D/C'd         polyethylene glycol (MIRALAX) powder 17 g   Dose: 17 g  Freq: Daily PRN Route: PO  PRN Comment: constipation  Start: 02/08/20 2148         potassium chloride (MICRO-K) CR capsule 40 mEq   Dose: 40 mEq  Freq: As Needed Route: PO  PRN Comment: Potassium Replacement.  See Admin Instructions  Start: 02/10/20 0858    Admin Instructions:   Potassium 3.1 or Less Give KCl 40 mEq q4h x3 Doses   Potassium 3.2 - 3.6 Give KCl 40 mEq q4h x2 Doses     Check Potassium 4 Hours After Last Dose Given   Check Magnesium if Potassium Level Remains Low After Replacement   DO NOT GIVE if CrCl is Less Than 30 mL/minute or Urine Output Less Than 30 mL/hr         Or  potassium chloride (KLOR-CON) packet 40 mEq   Dose: 40 mEq  Freq: As Needed Route: PO  PRN Comment: potassium replacement, see admin instructions  Start: 02/10/20 0858    Admin Instructions:   Potassium 3.1 or Less Give KCl 40 mEq q4h x3 Doses   Potassium 3.2 - 3.6 Give KCl 40 mEq q4h x2 Doses     Check Potassium 4 Hours After Last Dose Given   Check Magnesium if Potassium Level Remains Low After Replacement   DO NOT GIVE if CrCl is Less Than 30 mL/minute or Urine Output Less Than 30 mL/hr         Or  potassium chloride 10 mEq in 100 mL IVPB   Dose: 10  mEq  Freq: Every 1 Hour PRN Route: IV  PRN Comment: Potassium Replacement - See Admin Instructions  Start: 02/10/20 0858    Admin Instructions:   Peripheral or Central IV  Potassium 3.1 or Less Give KCl 10 mEq/100 mL NS IV q1h x6 Doses  Potassium 3.2 - 3.6 Give KCl 10 mEq/100 mL NS q1h x4 Doses    Check Potassium 4 Hours After Last Dose Given  Check Magnesium if Potassium Remains Low After Replacement  DO NOT GIVE if CrCl is Less Than 30 mL/minute or Urine Output Less Than 30 mL/hr.     Rates Greater Than 10 mEq/hr Require ECG Monitoring.  OUTPATIENT/NON-MONITORED UNITS: Potassium Chloride standard bolus infusion rate is a maximum of 10 mEq/hr on unmonitored patients    MONITORED UNITS: Potassium Chloride standard bolus infusion rate is a maximum of 20 mEq/hr on ECG monitored patients ONLY         Propofol (DIPRIVAN) injection   Freq: Continuous PRN  Start: 02/13/20 1244 End: 02/13/20 1313     1244   1313-D/C'd         Propofol (DIPRIVAN) injection   Freq: As Needed Route: IV  Start: 02/13/20 1244 End: 02/13/20 1313     1244   1313-D/C'd         sodium chloride 0.9 % flush 10 mL   Dose: 10 mL  Freq: As Needed Route: IV  PRN Reason: Line Care  Start: 02/08/20 0800         sodium chloride 0.9 % infusion   Rate: 30 mL/hr Dose: 30 mL/hr  Freq: Continuous PRN Route: IV  PRN Comment: Start Prior to Procedure  Start: 02/13/20 1139     1154   1317           Medications 02/12/20 02/13/20 02/14/20            Slit Excision Additional Text (Leave Blank If You Do Not Want): A linear line was drawn on the skin overlying the lesion. An incision was made slowly until the lesion was visualized.  Once visualized, the lesion was removed with blunt dissection.

## 2021-03-11 ENCOUNTER — IMMUNIZATION (OUTPATIENT)
Dept: VACCINE CLINIC | Facility: HOSPITAL | Age: 61
End: 2021-03-11

## 2021-03-11 PROCEDURE — 0001A: CPT | Performed by: INTERNAL MEDICINE

## 2021-03-11 PROCEDURE — 91300 HC SARSCOV02 VAC 30MCG/0.3ML IM: CPT | Performed by: INTERNAL MEDICINE

## 2021-04-01 ENCOUNTER — IMMUNIZATION (OUTPATIENT)
Dept: VACCINE CLINIC | Facility: HOSPITAL | Age: 61
End: 2021-04-01

## 2021-04-01 PROCEDURE — 0002A: CPT | Performed by: INTERNAL MEDICINE

## 2021-04-01 PROCEDURE — 91300 HC SARSCOV02 VAC 30MCG/0.3ML IM: CPT | Performed by: INTERNAL MEDICINE

## 2022-11-28 ENCOUNTER — HOSPITAL ENCOUNTER (EMERGENCY)
Facility: HOSPITAL | Age: 62
Discharge: ANOTHER HOSPITAL | End: 2022-11-29
Attending: EMERGENCY MEDICINE | Admitting: EMERGENCY MEDICINE

## 2022-11-28 ENCOUNTER — APPOINTMENT (OUTPATIENT)
Dept: CT IMAGING | Facility: HOSPITAL | Age: 62
End: 2022-11-28

## 2022-11-28 DIAGNOSIS — R29.90 STROKE-LIKE SYMPTOMS: ICD-10-CM

## 2022-11-28 DIAGNOSIS — R56.9 WITNESSED SEIZURE-LIKE ACTIVITY: Primary | ICD-10-CM

## 2022-11-28 DIAGNOSIS — N39.0 ACUTE UTI: ICD-10-CM

## 2022-11-28 LAB
ALBUMIN SERPL-MCNC: 3.9 G/DL (ref 3.5–5.2)
ALBUMIN/GLOB SERPL: 1.3 G/DL
ALP SERPL-CCNC: 71 U/L (ref 39–117)
ALT SERPL W P-5'-P-CCNC: 41 U/L (ref 1–33)
ANION GAP SERPL CALCULATED.3IONS-SCNC: 18 MMOL/L (ref 5–15)
AST SERPL-CCNC: 83 U/L (ref 1–32)
BACTERIA UR QL AUTO: ABNORMAL /HPF
BASOPHILS # BLD AUTO: 0.09 10*3/MM3 (ref 0–0.2)
BASOPHILS NFR BLD AUTO: 1.3 % (ref 0–1.5)
BILIRUB SERPL-MCNC: 0.9 MG/DL (ref 0–1.2)
BILIRUB UR QL STRIP: NEGATIVE
BUN SERPL-MCNC: 9 MG/DL (ref 8–23)
BUN/CREAT SERPL: 7.4 (ref 7–25)
CALCIUM SPEC-SCNC: 9 MG/DL (ref 8.6–10.5)
CHLORIDE SERPL-SCNC: 98 MMOL/L (ref 98–107)
CLARITY UR: ABNORMAL
CO2 SERPL-SCNC: 17 MMOL/L (ref 22–29)
COLOR UR: ABNORMAL
CREAT SERPL-MCNC: 1.21 MG/DL (ref 0.57–1)
DEPRECATED RDW RBC AUTO: 45.3 FL (ref 37–54)
EGFRCR SERPLBLD CKD-EPI 2021: 50.5 ML/MIN/1.73
EOSINOPHIL # BLD AUTO: 0.14 10*3/MM3 (ref 0–0.4)
EOSINOPHIL NFR BLD AUTO: 2.1 % (ref 0.3–6.2)
ERYTHROCYTE [DISTWIDTH] IN BLOOD BY AUTOMATED COUNT: 12.3 % (ref 12.3–15.4)
ETHANOL BLD-MCNC: <10 MG/DL (ref 0–10)
ETHANOL UR QL: <0.01 %
GLOBULIN UR ELPH-MCNC: 2.9 GM/DL
GLUCOSE BLDC GLUCOMTR-MCNC: 164 MG/DL (ref 70–130)
GLUCOSE SERPL-MCNC: 161 MG/DL (ref 65–99)
GLUCOSE UR STRIP-MCNC: NEGATIVE MG/DL
HCT VFR BLD AUTO: 32.3 % (ref 34–46.6)
HGB BLD-MCNC: 11.7 G/DL (ref 12–15.9)
HGB UR QL STRIP.AUTO: ABNORMAL
HYALINE CASTS UR QL AUTO: ABNORMAL /LPF
IMM GRANULOCYTES # BLD AUTO: 0.03 10*3/MM3 (ref 0–0.05)
IMM GRANULOCYTES NFR BLD AUTO: 0.4 % (ref 0–0.5)
INR PPP: 1.13 (ref 0.9–1.1)
KETONES UR QL STRIP: ABNORMAL
LEUKOCYTE ESTERASE UR QL STRIP.AUTO: ABNORMAL
LYMPHOCYTES # BLD AUTO: 1.2 10*3/MM3 (ref 0.7–3.1)
LYMPHOCYTES NFR BLD AUTO: 17.7 % (ref 19.6–45.3)
MCH RBC QN AUTO: 35.9 PG (ref 26.6–33)
MCHC RBC AUTO-ENTMCNC: 36.2 G/DL (ref 31.5–35.7)
MCV RBC AUTO: 99.1 FL (ref 79–97)
MONOCYTES # BLD AUTO: 0.7 10*3/MM3 (ref 0.1–0.9)
MONOCYTES NFR BLD AUTO: 10.3 % (ref 5–12)
NEUTROPHILS NFR BLD AUTO: 4.62 10*3/MM3 (ref 1.7–7)
NEUTROPHILS NFR BLD AUTO: 68.2 % (ref 42.7–76)
NITRITE UR QL STRIP: POSITIVE
NRBC BLD AUTO-RTO: 0 /100 WBC (ref 0–0.2)
PH UR STRIP.AUTO: 6.5 [PH] (ref 5–8)
PLATELET # BLD AUTO: 167 10*3/MM3 (ref 140–450)
PMV BLD AUTO: 9.7 FL (ref 6–12)
POTASSIUM SERPL-SCNC: 4.3 MMOL/L (ref 3.5–5.2)
PROT SERPL-MCNC: 6.8 G/DL (ref 6–8.5)
PROT UR QL STRIP: ABNORMAL
PROTHROMBIN TIME: 14.7 SECONDS (ref 11.7–14.2)
RBC # BLD AUTO: 3.26 10*6/MM3 (ref 3.77–5.28)
RBC # UR STRIP: ABNORMAL /HPF
REF LAB TEST METHOD: ABNORMAL
SODIUM SERPL-SCNC: 133 MMOL/L (ref 136–145)
SP GR UR STRIP: 1.02 (ref 1–1.03)
SQUAMOUS #/AREA URNS HPF: ABNORMAL /HPF
UROBILINOGEN UR QL STRIP: ABNORMAL
WBC # UR STRIP: ABNORMAL /HPF
WBC NRBC COR # BLD: 6.78 10*3/MM3 (ref 3.4–10.8)

## 2022-11-28 PROCEDURE — 25010000002 CEFTRIAXONE PER 250 MG: Performed by: NURSE PRACTITIONER

## 2022-11-28 PROCEDURE — 87086 URINE CULTURE/COLONY COUNT: CPT | Performed by: NURSE PRACTITIONER

## 2022-11-28 PROCEDURE — 96365 THER/PROPH/DIAG IV INF INIT: CPT

## 2022-11-28 PROCEDURE — 87186 SC STD MICRODIL/AGAR DIL: CPT | Performed by: NURSE PRACTITIONER

## 2022-11-28 PROCEDURE — 85025 COMPLETE CBC W/AUTO DIFF WBC: CPT | Performed by: NURSE PRACTITIONER

## 2022-11-28 PROCEDURE — 87077 CULTURE AEROBIC IDENTIFY: CPT | Performed by: NURSE PRACTITIONER

## 2022-11-28 PROCEDURE — 81001 URINALYSIS AUTO W/SCOPE: CPT | Performed by: NURSE PRACTITIONER

## 2022-11-28 PROCEDURE — 70450 CT HEAD/BRAIN W/O DYE: CPT

## 2022-11-28 PROCEDURE — 80053 COMPREHEN METABOLIC PANEL: CPT | Performed by: NURSE PRACTITIONER

## 2022-11-28 PROCEDURE — 72125 CT NECK SPINE W/O DYE: CPT

## 2022-11-28 PROCEDURE — 82077 ASSAY SPEC XCP UR&BREATH IA: CPT | Performed by: NURSE PRACTITIONER

## 2022-11-28 PROCEDURE — 99285 EMERGENCY DEPT VISIT HI MDM: CPT

## 2022-11-28 PROCEDURE — 85610 PROTHROMBIN TIME: CPT | Performed by: NURSE PRACTITIONER

## 2022-11-28 PROCEDURE — 82962 GLUCOSE BLOOD TEST: CPT

## 2022-11-28 RX ORDER — SODIUM CHLORIDE 0.9 % (FLUSH) 0.9 %
10 SYRINGE (ML) INJECTION AS NEEDED
Status: DISCONTINUED | OUTPATIENT
Start: 2022-11-28 | End: 2022-11-29 | Stop reason: HOSPADM

## 2022-11-28 RX ORDER — ACETAMINOPHEN 500 MG
1000 TABLET ORAL ONCE
Status: COMPLETED | OUTPATIENT
Start: 2022-11-28 | End: 2022-11-28

## 2022-11-28 RX ADMIN — SODIUM CHLORIDE 500 ML: 9 INJECTION, SOLUTION INTRAVENOUS at 15:26

## 2022-11-28 RX ADMIN — ACETAMINOPHEN 1000 MG: 500 TABLET ORAL at 17:08

## 2022-11-28 RX ADMIN — CEFTRIAXONE SODIUM 1 G: 1 INJECTION, POWDER, FOR SOLUTION INTRAMUSCULAR; INTRAVENOUS at 17:20

## 2022-11-29 VITALS
HEART RATE: 76 BPM | RESPIRATION RATE: 18 BRPM | OXYGEN SATURATION: 97 % | SYSTOLIC BLOOD PRESSURE: 166 MMHG | TEMPERATURE: 97.6 F | BODY MASS INDEX: 20.67 KG/M2 | DIASTOLIC BLOOD PRESSURE: 110 MMHG | WEIGHT: 132 LBS

## 2022-11-30 LAB — BACTERIA SPEC AEROBE CULT: ABNORMAL

## (undated) DEVICE — KT ORCA ORCAPOD DISP STRL

## (undated) DEVICE — ADAPT CLN BIOGUARD AIR/H2O DISP

## (undated) DEVICE — CANN O2 ETCO2 FITS ALL CONN CO2 SMPL A/ 7IN DISP LF

## (undated) DEVICE — TUBING, SUCTION, 1/4" X 10', STRAIGHT: Brand: MEDLINE

## (undated) DEVICE — THE TORRENT IRRIGATION SCOPE CONNECTOR IS USED WITH THE TORRENT IRRIGATION TUBING TO PROVIDE IRRIGATION FLUIDS SUCH AS STERILE WATER DURING GASTROINTESTINAL ENDOSCOPIC PROCEDURES WHEN USED IN CONJUNCTION WITH AN IRRIGATION PUMP (OR ELECTROSURGICAL UNIT).: Brand: TORRENT

## (undated) DEVICE — BITEBLOCK OMNI BLOC

## (undated) DEVICE — LN SMPL CO2 SHTRM SD STREAM W/M LUER

## (undated) DEVICE — SENSR O2 OXIMAX FNGR A/ 18IN NONSTR

## (undated) DEVICE — FRCP BX RADJAW4 NDL 2.8 240CM LG OG BX40